# Patient Record
Sex: MALE | Race: WHITE | NOT HISPANIC OR LATINO | URBAN - METROPOLITAN AREA
[De-identification: names, ages, dates, MRNs, and addresses within clinical notes are randomized per-mention and may not be internally consistent; named-entity substitution may affect disease eponyms.]

---

## 2017-01-23 ENCOUNTER — OFFICE VISIT (OUTPATIENT)
Dept: NEUROLOGY | Facility: CLINIC | Age: 32
End: 2017-01-23
Attending: PSYCHIATRY & NEUROLOGY
Payer: COMMERCIAL

## 2017-01-23 VITALS
HEART RATE: 87 BPM | WEIGHT: 200.2 LBS | HEIGHT: 73 IN | BODY MASS INDEX: 26.53 KG/M2 | DIASTOLIC BLOOD PRESSURE: 82 MMHG | SYSTOLIC BLOOD PRESSURE: 145 MMHG

## 2017-01-23 DIAGNOSIS — G35 MULTIPLE SCLEROSIS (H): Primary | ICD-10-CM

## 2017-01-23 DIAGNOSIS — R53.82 CHRONIC FATIGUE: ICD-10-CM

## 2017-01-23 DIAGNOSIS — R52 PAIN: ICD-10-CM

## 2017-01-23 PROCEDURE — 99212 OFFICE O/P EST SF 10 MIN: CPT | Mod: ZF

## 2017-01-23 RX ORDER — DEXTROAMPHETAMINE SACCHARATE, AMPHETAMINE ASPARTATE, DEXTROAMPHETAMINE SULFATE AND AMPHETAMINE SULFATE 2.5; 2.5; 2.5; 2.5 MG/1; MG/1; MG/1; MG/1
TABLET ORAL
Qty: 60 TABLET | Refills: 0 | Status: SHIPPED | OUTPATIENT
Start: 2017-01-23 | End: 2017-02-21

## 2017-01-23 RX ORDER — GABAPENTIN 300 MG/1
CAPSULE ORAL
Qty: 120 CAPSULE | Refills: 6 | Status: SHIPPED | OUTPATIENT
Start: 2017-01-23 | End: 2017-11-12

## 2017-01-23 ASSESSMENT — PAIN SCALES - GENERAL: PAINLEVEL: NO PAIN (0)

## 2017-01-23 NOTE — NURSING NOTE
Chief Complaint   Patient presents with     RECHECK     [facility] RETURN MULTIPLE SCLEROSIS     Marija San MA

## 2017-01-23 NOTE — Clinical Note
2017      RE: Jessy Arreola  [address] [facility] [address]       HISTORY OF PRESENT ILLNESS:  Appointment on Jessy Arreola who I see for relapsing-remitting multiple sclerosis, stable on Rebif.  The patient states overall he feels he is doing well.  When I last saw him, his mood was not good.  He states he is doing much better.  The gabapentin has been helpful for his neuropathic pain and restless legs at night.  He ran out a day or 2 ago and he has not been able to sleep.  He states the Adderall works well for his fatigue.  Generally sleeping well at night.  He is not missing any doses of his Rebif.  He states overall he feels he is doing well.  He has no issues or concerns he wishes to address at this time.  Last MRI was done back in 2015 and was stable.      MEDICATIONS:  Reviewed and up to date in Epic.      REVIEW OF SYSTEMS:  As per History of Present Illness.      PHYSICAL EXAMINATION:   VITAL SIGNS:  Blood pressure 145/82, pulse 87, weight 200 pounds.   GENERAL:  Pleasant, well-developed, well-nourished male in no apparent distress.   CRANIAL NERVES:  Cranial nerves II-XII are intact.   MOTOR:  Strength is 5/5 in all extremities, normal bulk and tone, no pronator drift.   CEREBELLAR:  Intact finger-to-nose.   GAIT:  Unremarkable.      IMPRESSION:     1.  Relapsing-remitting multiple sclerosis, stable.   2.  Bilateral foot pain improved with gabapentin at bedtime.   3.  Restless leg syndrome improved with gabapentin.      PLAN:   1.  Refilled the gabapentin.     2.  Refilled the Adderall.   3.  Get a CBC and hepatic profile today.  The patient is going to get a followup MRI in approximately a year prior to his followup appointment with one of the other MS doctors.         ELLYN SARABIA DO             D: 2017 10:29   T: 2017 08:39   MT: ismael      Name:     JESSY ARREOLA   MRN:      [id number]        Account:      [id number]   :      1985            Service Date: 01/23/2017      Document: [id number]

## 2017-01-23 NOTE — Clinical Note
1/23/2017       RE: Darin Maier  [address] [facility] [address]     Dear Colleague,    Thank you for referring your patient, Darin Maier, to the [facility] MULTIPLE SCLEROSIS at [facility]. Please see a copy of my visit note below.    This office note has been dictated.     Again, thank you for allowing me to participate in the care of your patient.      Sincerely,    Emile Kinney, DO

## 2017-01-24 NOTE — PROGRESS NOTES
HISTORY OF PRESENT ILLNESS:  Appointment on Jessy Arreola who I see for relapsing-remitting multiple sclerosis, stable on Rebif.  The patient states overall he feels he is doing well.  When I last saw him, his mood was not good.  He states he is doing much better.  The gabapentin has been helpful for his neuropathic pain and restless legs at night.  He ran out a day or 2 ago and he has not been able to sleep.  He states the Adderall works well for his fatigue.  Generally sleeping well at night.  He is not missing any doses of his Rebif.  He states overall he feels he is doing well.  He has no issues or concerns he wishes to address at this time.  Last MRI was done back in 2015 and was stable.      MEDICATIONS:  Reviewed and up to date in Epic.      REVIEW OF SYSTEMS:  As per History of Present Illness.      PHYSICAL EXAMINATION:   VITAL SIGNS:  Blood pressure 145/82, pulse 87, weight 200 pounds.   GENERAL:  Pleasant, well-developed, well-nourished male in no apparent distress.   CRANIAL NERVES:  Cranial nerves II-XII are intact.   MOTOR:  Strength is 5/5 in all extremities, normal bulk and tone, no pronator drift.   CEREBELLAR:  Intact finger-to-nose.   GAIT:  Unremarkable.      IMPRESSION:     1.  Relapsing-remitting multiple sclerosis, stable.   2.  Bilateral foot pain improved with gabapentin at bedtime.   3.  Restless leg syndrome improved with gabapentin.      PLAN:   1.  Refilled the gabapentin.     2.  Refilled the Adderall.   3.  Get a CBC and hepatic profile today.  The patient is going to get a followup MRI in approximately a year prior to his followup appointment with one of the other MS doctors.         ELLYN SARABIA DO             D: 2017 10:29   T: 2017 08:39   MT: ismael      Name:     JESSY ARREOLA   MRN:      [id number]        Account:      [id number]   :      1985           Service Date: 2017      Document: [id number]

## 2017-02-09 DIAGNOSIS — G35 MULTIPLE SCLEROSIS (H): Primary | ICD-10-CM

## 2017-02-09 NOTE — TELEPHONE ENCOUNTER
Received refill request for Rebif from [facility]; Patient was last seen in January by Dr Kinney and will follow up appointment in 1 year with another MS provider (not scheduled yet); Refilled for 1 year per MS refill protocol.    Airam Cazares, MS RN Care Coordinator

## 2017-02-21 DIAGNOSIS — R53.82 CHRONIC FATIGUE: ICD-10-CM

## 2017-02-21 RX ORDER — DEXTROAMPHETAMINE SACCHARATE, AMPHETAMINE ASPARTATE, DEXTROAMPHETAMINE SULFATE AND AMPHETAMINE SULFATE 2.5; 2.5; 2.5; 2.5 MG/1; MG/1; MG/1; MG/1
TABLET ORAL
Qty: 60 TABLET | Refills: 0 | Status: SHIPPED | OUTPATIENT
Start: 2017-02-21 | End: 2017-03-24

## 2017-02-21 NOTE — TELEPHONE ENCOUNTER
Patient called requesting refill of their Adderall; Patient was last seen in January by Dr Kinney and will follow up appointment with a different MS provider in January 2018; Pended rx to Dr Phelps for signature, as Dr Kinney is not available to sign prescriptions, and will mail to the home once signed.    Airam Cazares, MS RN Care Coordinator

## 2017-03-24 ENCOUNTER — TELEPHONE (OUTPATIENT)
Dept: NEUROLOGY | Facility: CLINIC | Age: 32
End: 2017-03-24

## 2017-03-24 DIAGNOSIS — R53.82 CHRONIC FATIGUE: ICD-10-CM

## 2017-03-28 RX ORDER — DEXTROAMPHETAMINE SACCHARATE, AMPHETAMINE ASPARTATE, DEXTROAMPHETAMINE SULFATE AND AMPHETAMINE SULFATE 2.5; 2.5; 2.5; 2.5 MG/1; MG/1; MG/1; MG/1
TABLET ORAL
Qty: 60 TABLET | Refills: 0 | Status: SHIPPED | OUTPATIENT
Start: 2017-03-28 | End: 2017-04-27

## 2017-04-27 DIAGNOSIS — R53.82 CHRONIC FATIGUE: ICD-10-CM

## 2017-04-27 RX ORDER — DEXTROAMPHETAMINE SACCHARATE, AMPHETAMINE ASPARTATE, DEXTROAMPHETAMINE SULFATE AND AMPHETAMINE SULFATE 2.5; 2.5; 2.5; 2.5 MG/1; MG/1; MG/1; MG/1
TABLET ORAL
Qty: 60 TABLET | Refills: 0 | Status: SHIPPED | OUTPATIENT
Start: 2017-04-27 | End: 2017-06-02

## 2017-04-27 NOTE — TELEPHONE ENCOUNTER
Patient called requesting refill of their Adderall; Patient was last seen in January by Dr Kinney and will follow up appointment with a different MS provider in January 2018; Pended rx to Dr Lyeva for signature, as Dr Kinney is not available to sign prescriptions, and will mail to the home once signed.      Airam Cazares, MS RN Care Coordinator

## 2017-04-27 NOTE — TELEPHONE ENCOUNTER
Approved refill for Adderall 10 mg, quantity 60 with no refills for this patient of Dr. Kinney as he is away from the clinic and unavailable to sign prescriptions.

## 2017-06-02 DIAGNOSIS — R53.82 CHRONIC FATIGUE: ICD-10-CM

## 2017-06-02 NOTE — TELEPHONE ENCOUNTER
Patient called requesting refill of their Adderall; Patient was last seen in January and has no follow up appointment with any of our MS providers; Pended rx to Dr. Phelps for signature on behalf of Dr. Kinney as he is away from the office and will mail to Darin's home once signed. I will have the clinic coordinators call Darin and have them schedule a follow up with one of our providers.    Aicha Powers, MS RN Care Coordinator

## 2017-06-06 RX ORDER — DEXTROAMPHETAMINE SACCHARATE, AMPHETAMINE ASPARTATE, DEXTROAMPHETAMINE SULFATE AND AMPHETAMINE SULFATE 2.5; 2.5; 2.5; 2.5 MG/1; MG/1; MG/1; MG/1
TABLET ORAL
Qty: 60 TABLET | Refills: 0 | Status: SHIPPED | OUTPATIENT
Start: 2017-06-06 | End: 2017-07-06

## 2017-07-06 DIAGNOSIS — R53.82 CHRONIC FATIGUE: ICD-10-CM

## 2017-07-06 RX ORDER — DEXTROAMPHETAMINE SACCHARATE, AMPHETAMINE ASPARTATE, DEXTROAMPHETAMINE SULFATE AND AMPHETAMINE SULFATE 2.5; 2.5; 2.5; 2.5 MG/1; MG/1; MG/1; MG/1
TABLET ORAL
Qty: 60 TABLET | Refills: 0 | Status: SHIPPED | OUTPATIENT
Start: 2017-07-06 | End: 2017-08-08

## 2017-07-06 NOTE — TELEPHONE ENCOUNTER
Patient called requesting refill of their Adderall; Patient was last seen in January by Dr Kinney and has follow up appointment in January 2018 with Dr Spear; Pended rx to Dr Leyva for signature, on behalf of Dr Kinney; and will mail to the home once signed.    Airam Cazares MS RN Care Coordinator

## 2017-07-06 NOTE — TELEPHONE ENCOUNTER
Approved refill for Adderall 10 mg, quantity 60 with no refills for this patient who was previously followed in this clinic by Dr. Kinney and has follow up scheduled with Dr. Spear.

## 2017-07-25 ENCOUNTER — TELEPHONE (OUTPATIENT)
Dept: NEUROLOGY | Facility: CLINIC | Age: 32
End: 2017-07-25

## 2017-07-25 NOTE — TELEPHONE ENCOUNTER
I called Darin back with Dr Phelps's input; He is fine with this plan and will call me any symptoms worsen or persist; He says that he does not have jaw pain, and that as far as he knows, does not grind his teeth at night; He asked about his MRI, and would like it done at [facility], which I will arrange in December; He did not complete his labs when he was here for his appointment with Dr Kinney back in January; He would like to do these at [facility] (phone [phone] fax [phone]); I will fax these on Friday, and Darin will complete these next week.    Airam Cazares, MS RN Care Coordinator

## 2017-07-25 NOTE — TELEPHONE ENCOUNTER
I received the following message from the triage nurse:    Symptom tight muscles of tongue, and Right cheek (didn't say for how long) which is annoying. Unsure if he should be concerned about it. Please address.    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    Airam Cazaers, MS RN Care Coordinator

## 2017-07-25 NOTE — TELEPHONE ENCOUNTER
Just watch it for now.  Does he grind his teeth at high, or have jaw pain?  It's not a symptom that is typical of MS relapse.

## 2017-08-08 DIAGNOSIS — R53.82 CHRONIC FATIGUE: ICD-10-CM

## 2017-08-08 RX ORDER — DEXTROAMPHETAMINE SACCHARATE, AMPHETAMINE ASPARTATE, DEXTROAMPHETAMINE SULFATE AND AMPHETAMINE SULFATE 2.5; 2.5; 2.5; 2.5 MG/1; MG/1; MG/1; MG/1
TABLET ORAL
Qty: 60 TABLET | Refills: 0 | Status: SHIPPED | OUTPATIENT
Start: 2017-08-08 | End: 2017-09-07

## 2017-08-08 NOTE — TELEPHONE ENCOUNTER
Patient called requesting refill of their Adderall; Patient was last seen in January by Dr Kinney and has follow up appointment in January 2018 with Dr Spear; Pended rx to Dr Phelps for signature, on behalf of Dr Kinney; and will mail to the home once signed.     Airam Cazares, MS RN Care Coordinator

## 2017-09-07 DIAGNOSIS — R53.82 CHRONIC FATIGUE: ICD-10-CM

## 2017-09-11 ENCOUNTER — TELEPHONE (OUTPATIENT)
Dept: NEUROLOGY | Facility: CLINIC | Age: 32
End: 2017-09-11

## 2017-09-11 NOTE — TELEPHONE ENCOUNTER
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 Kinney for input, then call him back later today or tomorrow; Darin asked about his Adderall, and we talked about requesting his refill in sufficient time; He asked if Dr. Kinney would be willing to provide him 2 months supply at a time, given he works 120hrs/week, and doesn't get to the pharmacy in time for his refill; I told him that I would ask, but it is generally a clinic policy, but exceptions can be made; Will route to Dr. Kinney for input.    Airam Cazares, MS RN Care Coordinator

## 2017-09-12 RX ORDER — DEXTROAMPHETAMINE SACCHARATE, AMPHETAMINE ASPARTATE, DEXTROAMPHETAMINE SULFATE AND AMPHETAMINE SULFATE 2.5; 2.5; 2.5; 2.5 MG/1; MG/1; MG/1; MG/1
TABLET ORAL
Qty: 60 TABLET | Refills: 0 | Status: SHIPPED | OUTPATIENT
Start: 2017-09-12 | End: 2017-10-12

## 2017-09-14 NOTE — TELEPHONE ENCOUNTER
I called Darin back with Dr. Kinney's input; He is just worried, as well as his mom and wife, about his symptoms; He is going to follow up with his PCP, and I am going to have Roberta call him on Monday; I did give him the hospital phone number for just in case he worsens over the weekend.    Airam Cazares, MS RN Care Coordinator

## 2017-09-18 NOTE — TELEPHONE ENCOUNTER
"Called and spoke with Darin who states that his symptoms have not improved. He continues to have a sore neck on the right side and numbness in the right shoulder down the arm and into the thumb. He says the numbness is \"bothersome\" but does not associate pain. He did see a primary care doctor on Friday and the doctor said he is very \"tight\" and was possibly thinking a pinched nerve although no imaging was done. The MD did prescribe a round of oral steroids for an insect bite that was causing swelling on the right leg. This helped the swelling but did not help any of the symptoms he has been experiencing for about the last 2 weeks. Pt also said that his foot symptoms are worsening (feet are more achy and it \"feels like I'm walking barefoot all the time\").   Pt would like to see Elizabeth Ramirez NP and possibly have imaging done now vs in December prior to his appointment with Dr. Spear in early January. Will route to clinic coordinators to schedule.     Roberta Simon RN Care Coordinator      "

## 2017-09-21 ENCOUNTER — OFFICE VISIT (OUTPATIENT)
Dept: NEUROLOGY | Facility: CLINIC | Age: 32
End: 2017-09-21
Attending: NURSE PRACTITIONER
Payer: COMMERCIAL

## 2017-09-21 VITALS — DIASTOLIC BLOOD PRESSURE: 81 MMHG | HEART RATE: 88 BPM | HEIGHT: 73 IN | SYSTOLIC BLOOD PRESSURE: 126 MMHG

## 2017-09-21 DIAGNOSIS — G35 MULTIPLE SCLEROSIS (H): ICD-10-CM

## 2017-09-21 DIAGNOSIS — G35 MULTIPLE SCLEROSIS (H): Primary | ICD-10-CM

## 2017-09-21 DIAGNOSIS — M25.511 RIGHT SHOULDER PAIN, UNSPECIFIED CHRONICITY: ICD-10-CM

## 2017-09-21 LAB
ALBUMIN SERPL-MCNC: 3.6 G/DL (ref 3.4–5)
ALP SERPL-CCNC: 87 U/L (ref 40–150)
ALT SERPL W P-5'-P-CCNC: 32 U/L (ref 0–70)
AST SERPL W P-5'-P-CCNC: 13 U/L (ref 0–45)
BASOPHILS # BLD AUTO: 0 10E9/L (ref 0–0.2)
BASOPHILS NFR BLD AUTO: 0.1 %
BILIRUB DIRECT SERPL-MCNC: 0.1 MG/DL (ref 0–0.2)
BILIRUB SERPL-MCNC: 0.3 MG/DL (ref 0.2–1.3)
DIFFERENTIAL METHOD BLD: NORMAL
EOSINOPHIL # BLD AUTO: 0 10E9/L (ref 0–0.7)
EOSINOPHIL NFR BLD AUTO: 0.6 %
ERYTHROCYTE [DISTWIDTH] IN BLOOD BY AUTOMATED COUNT: 12.3 % (ref 10–15)
HCT VFR BLD AUTO: 45.8 % (ref 40–53)
HGB BLD-MCNC: 15.4 G/DL (ref 13.3–17.7)
IMM GRANULOCYTES # BLD: 0 10E9/L (ref 0–0.4)
IMM GRANULOCYTES NFR BLD: 0.6 %
LYMPHOCYTES # BLD AUTO: 1.9 10E9/L (ref 0.8–5.3)
LYMPHOCYTES NFR BLD AUTO: 27.6 %
MCH RBC QN AUTO: 30 PG (ref 26.5–33)
MCHC RBC AUTO-ENTMCNC: 33.6 G/DL (ref 31.5–36.5)
MCV RBC AUTO: 89 FL (ref 78–100)
MONOCYTES # BLD AUTO: 0.7 10E9/L (ref 0–1.3)
MONOCYTES NFR BLD AUTO: 9.8 %
NEUTROPHILS # BLD AUTO: 4.2 10E9/L (ref 1.6–8.3)
NEUTROPHILS NFR BLD AUTO: 61.3 %
NRBC # BLD AUTO: 0 10*3/UL
NRBC BLD AUTO-RTO: 0 /100
PLATELET # BLD AUTO: 215 10E9/L (ref 150–450)
PROT SERPL-MCNC: 7.4 G/DL (ref 6.8–8.8)
RBC # BLD AUTO: 5.14 10E12/L (ref 4.4–5.9)
WBC # BLD AUTO: 6.9 10E9/L (ref 4–11)

## 2017-09-21 PROCEDURE — 80076 HEPATIC FUNCTION PANEL: CPT | Performed by: PSYCHIATRY & NEUROLOGY

## 2017-09-21 PROCEDURE — 85025 COMPLETE CBC W/AUTO DIFF WBC: CPT | Performed by: PSYCHIATRY & NEUROLOGY

## 2017-09-21 PROCEDURE — 36415 COLL VENOUS BLD VENIPUNCTURE: CPT | Performed by: PSYCHIATRY & NEUROLOGY

## 2017-09-21 PROCEDURE — 99211 OFF/OP EST MAY X REQ PHY/QHP: CPT | Mod: ZF

## 2017-09-21 RX ORDER — CYCLOBENZAPRINE HCL 5 MG
5 TABLET ORAL
Qty: 30 TABLET | Refills: 0 | Status: SHIPPED | OUTPATIENT
Start: 2017-09-21 | End: 2017-11-14

## 2017-09-21 NOTE — NURSING NOTE
"Chief Complaint   Patient presents with     RECHECK     Multiple Sclerosis       Initial /81  Pulse 88  Ht 1.854 m (6' 1\") Estimated body mass index is 26.41 kg/(m^2) as calculated from the following:    Height as of 1/23/17: 1.854 m (6' 1\").    Weight as of 1/23/17: 90.8 kg (200 lb 3.2 oz).  Medication Reconciliation: complete   Juan Diego Jewell, JUNIOR        "

## 2017-09-21 NOTE — PATIENT INSTRUCTIONS
1. Flexeril 5 mg at bedtime for right sided shoulder pain. May take it up to twice daily if no side effects.    2. MRI brain and cervical spine within one week.     3. Continue Rebif. We will do labs today for drug monitoring.     4. Follow up with Dr. Spear in January as scheduled.

## 2017-09-21 NOTE — LETTER
9/21/2017       RE: Darin Maier  [address] [facility] [address]     Dear Colleague,    Thank you for referring your patient, Darin Maier, to the [facility] MULTIPLE SCLEROSIS at [facility]. Please see a copy of my visit note below.      [facility] OUTPATIENT MS CLINIC VISIT NOTE    REASON FOR VISIT:  Darin is a 32-year-old male who returns to the clinic today for regularly scheduled followup of his diagnosis of relapsing-remitting multiple sclerosis.  He was most recently seen by Dr. Kinney in 01/2017.      HISTORY OF PRESENT ILLNESS:     DATE OF ONSET:  2004.        INITIAL SYMPTOMS:  Sensory issues and double vision.        INITIAL CLINICAL COURSE:  Relapsing remitting.        CURRENT CLINICAL COURSE:  Relapsing remitting multiple sclerosis.      PAST DISEASE MODIFYING THERAPY:  Copaxone from 2004 through 2005, then started on Rebif in 2005/2006 timeframe.        CURRENT DISEASE MODIFYING THERAPY:  Rebif.        INTERVAL HISTORY: Darin was seen by Dr. Kinney in 01/2017.  At that time he was doing well and stable on Rebif.  Then, patient contacted our clinic on 09/11 with complaints of pain and numbness in his right shoulder all the way down to his right hand.  Today he tells me that the symptoms started all of a sudden rather than progressed throughout the right arm.  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  He thinks that right arm symptoms may have originated around that time. Also feel a tightness in his right shoulder/ upper back/ neck and arm. His right arm feels to be weaker since the new symptoms started.  He is dropping things more frequently.  His legs are working okay.  He continues to have a feeling of walking on gravel on both his feet. He takes  mg at bedtime. He also had a bee sting 2-3 months ago and was treated with  "oral steroids by his primary care physician.      For his MS, he takes Rebif.  No injection site reactions or side effects.  He premedicates with Aleve. Denies any issues with this memory, vision, speech or swallowing.   No issues with balance.  He continues to be able to climb stairs without any issues.  He is not on any medication for depression or anxiety.  He continues to take Adderall for fatigue.  He takes Adderall, sometimes every day throughout the week.  He sleeps about 8 hours at night.  Occasional muscle cramps, especially if he uses alcohol.  Denies any bladder or bowel issues.      PAST MEDICAL/SURGICAL HISTORY:  MS, otherwise unremarkable.      FAMILY HISTORY:  Negative for MS but his mom has lupus.      SOCIAL HISTORY:  He is .  They have 2 children, ages 4 and 2 years old.  He works full-time in lawn care.  Denies smoking.  Occasional alcohol use.  Denies any recreational drug use.      IMAGIN2015 MRI brain with and without contrast.        CONCLUSIONS:     1. Slightly increased conspicuity and number of lesions in the frontal lobe visualized.  Pattern compatible with the multiple sclerosis.  There is no abnormal enhancement, no restricted diffusion or other signs for acute MS plaque.  Increased conspicuity of lesions is likely in part due to a 3 Theresa MR system used on the current exam.     2. Mucous retention cyst in the maxillary sinus stable.      VITAMIN D:  He is currently not taking any vitamin D since it is summertime.  During winter months he takes 5000 international units daily.  I do not have a recent vitamin D level in our system.      PHYSICAL EXAMINATION:   VITAL SIGNS:   /81  Pulse 88  Ht 1.854 m (6' 1\")   MENTAL STATUS: Alert,awake and oriented times four.   CRANIAL NERVES: Visual fields are full to confrontation. The pupils are round and react to light and there is no Blayne Diamond pupil. Funduscopic examination demonstrates no optic pallor, papilledema or retinal " hemorrhage/exudate. Extraocular movements are intact with no internuclear ophthalmoplegia. No nystagmus. Facial strength and sensation are normal. Hearing is  normal. Palate elevation and tongue protrusion are  normal.   POWER: Strength is normal (5/5) in the following muscles/groups: Deltoids, biceps, triceps, wrist extensors, left finger abductors,  hip flexors, knee flexors, foot dorsiflexors. Right finger abductors with mild weakness at 4+/5.   SENSORY: Reports reduced sensation to light touch in right arm. Also had difficulty differentiating sharp/ dull sensation in C5-6 dermatomal pattern.   REFLEXES: Noted brisker reflexes in his right arm. Lower extremities reflexes similar both sides.    MOTOR/CEREBELLAR: There are no tremors, myoclonus or other abnormal movements. Tone is within normal limits in the limbs. There is no appendicular ataxia on finger-to-nose testing and rapid alternating movements are normal in the extremities. There is no pronator drift.   GAIT: Gait is  narrow-based and steady and the patient is able to walk on heels, toes and in tandem without difficulty.     ASSESSMENT AND PLAN:     1. Relapsing remitting multiple sclerosis, on Rebif. Patient presenting with new clinical symptoms for about 4 weeks.  On clinical exam noted hyperactive reflexes in his right arm and also had trouble with differentiating sharp and dull sensation in C5-C6 dermatomal pattern. It is possible that he is having a clinical relapse. We will plan to get a brain and cervical MRI within a week to rule out any new or active lesions. He prefer to get this done at [facility] He will contact our clinic once he completes the MRI so that we can call the clinic and get the images pushed to our system. I gave him a printed order sheet for MRI brain and cervical spine.  We will plan to get a CBC with a differential and hepatic panel for Rebif  Monitoring. I will contact the patient once we have the images are  reviewed.      2. Right-sided neck and shoulder pain.  I gave him a prescription for Flexeril 5 mg to be taken at bedtime and if no side effects, he can increase it to twice daily for possible muscular pain.  I also recommended warm packs and/or ice packs.  He asked about massage therapy and I also placed an order for massage therapy.     3. Follow up with Dr. Spear in January as scheduled.         IKER BEGUM, CNP             D: 2017 12:08   T: 2017 13:16   MT: [id number]      Name:     JESSY ARREOLA   MRN:      [id number]        Account:      [id number]   :      1985           Service Date: 2017      Document: [id number]      Again, thank you for allowing me to participate in the care of your patient.      Sincerely,    IKER Saunders CNP

## 2017-09-21 NOTE — MR AVS SNAPSHOT
After Visit Summary   9/21/2017    Darin Maier    MRN: [id number]           Patient Information     Date Of Birth          1985        Visit Information        Provider Department      9/21/2017 10:30 AM Elizabeth Ramirez APRN CNP [facility] Multiple Sclerosis        Today's Diagnoses     Multiple sclerosis (H)    -  1    Right shoulder pain, unspecified chronicity          Care Instructions    1. Flexeril 5 mg at bedtime for right sided shoulder pain. May take it up to twice daily if no side effects.    2. MRI brain and cervical spine within one week.     3. Continue Rebif. We will do labs today for drug monitoring.     4. Follow up with Dr. Spear in January as scheduled.           Follow-ups after your visit        Your next 10 appointments already scheduled     Papi 10, 2018 12:00 PM CST   (Arrive by 11:45 AM)   Return Multiple Sclerosis with Michael Spear MD   [facility] Multiple Sclerosis ([facility])    [address] [place] [id number] [phone]              Future tests that were ordered for you today     Open Future Orders        Priority Expected Expires Ordered    MRI Brain w & w/o contrast Routine  9/21/2018 9/21/2017    MRI Cervical spine w & w/o contrast Timed  9/21/2018 9/21/2017            Who to contact     If you have questions or need follow up information about today's clinic visit or your schedule please contact [facility] MULTIPLE SCLEROSIS directly at [phone].  Normal or non-critical lab and imaging results will be communicated to you by MyChart, letter or phone within 4 business days after the clinic has received the results. If you do not hear from us within 7 days, please contact the clinic through MyChart or phone. If you have a critical or abnormal lab result, we will notify you by phone as soon as possible.  Submit refill requests through [employer] or call your pharmacy and they will forward the refill  "request to us. Please allow 3 business days for your refill to be completed.          Additional Information About Your Visit        [employer] Information     [employer] lets you send messages to your doctor, view your test results, renew your prescriptions, schedule appointments and more. To sign up, go to [email or web address] . Click on \"Log in\" on the left side of the screen, which will take you to the Welcome page. Then click on \"Sign up Now\" on the right side of the page.     You will be asked to enter the access code listed below, as well as some personal information. Please follow the directions to create your username and password.     Your access code is: [email or web address]  Expires: 2017  6:31 AM     Your access code will  in 90 days. If you need help or a new code, please call your [facility] clinic or [phone].        Care EveryWhere ID     This is your [facility] EveryWhere ID. This could be used by other organizations to access your [facility] medical records  [id number]        Your Vitals Were     Pulse Height                88 1.854 m (6' 1\")           Blood Pressure from Last 3 Encounters:   17 126/81   17 145/82   16 131/75    Weight from Last 3 Encounters:   17 90.8 kg (200 lb 3.2 oz)   13 94.3 kg (208 lb)   13 94.3 kg (208 lb)              We Performed the Following     MASSAGE THERAPY          Today's Medication Changes          These changes are accurate as of: 17  3:04 PM.  If you have any questions, ask your nurse or doctor.               Start taking these medicines.        Dose/Directions    cyclobenzaprine 5 MG tablet   Commonly known as:  FLEXERIL   Used for:  Right shoulder pain, unspecified chronicity   Started by:  Elizabeth Ramirez APRN CNP        Dose:  5 mg   Take 1 tablet (5 mg) by mouth nightly as needed for muscle spasms   Quantity:  30 tablet   Refills:  0            Where to get your medicines      These medications were sent to [id number] - [place], [place] - [address]  [address] [facility] [address]    Hours:  Ok's by albin HUDSON 9/20/06 Phone:  [phone]     cyclobenzaprine 5 MG tablet                Primary Care Provider    None Specified       No primary provider on file.        Equal Access to Services     [facility]: Hadii aad ku hadasho Soomaali, waaxda luqadaha, qaybta kaalmada adeegyada, waxay panfiloin hayaan adesteven khrosezach horvath . So [facility] [phone].    ATENCIÓN: Si habla español, tiene a orta disposición servicios gratuitos de asistencia lingüística. [facility] [phone].    We comply with applicable federal civil rights laws and [place] laws. We do not discriminate on the basis of race, color, national origin, age, disability sex, sexual orientation or gender identity.            Thank you!     Thank you for choosing [facility] MULTIPLE SCLEROSIS  for your care. Our goal is always to provide you with excellent care. Hearing back from our patients is one way we can continue to improve our services. Please take a few minutes to complete the written survey that you may receive in the mail after your visit with us. Thank you!             Your Updated Medication List - Protect others around you: Learn how to safely use, store and throw away your medicines at www.disposemymeds.org.          This list is accurate as of: 9/21/17  3:04 PM.  Always use your most recent med list.                   Brand Name Dispense Instructions for use Diagnosis    ALEVE 220 MG tablet   Generic drug:  naproxen sodium      Take 2 tablets by mouth daily.        amphetamine-dextroamphetamine 10 MG per tablet    ADDERALL    60 tablet    Take 1 in am and 1 prior to 2 pm    Chronic fatigue       cyclobenzaprine 5 MG tablet    FLEXERIL    30 tablet    Take 1 tablet (5 mg) by mouth nightly as needed for muscle spasms    Right shoulder pain, unspecified chronicity       FISH OIL PO      Take 1 capsule by mouth daily.         FLUoxetine 20 MG capsule    PROzac    60 capsule    Take 1 daily for 4 weeks then 2 daily    Multiple sclerosis (H), Depression       gabapentin 300 MG capsule    NEURONTIN    120 capsule    Take 1-4 at bedtime    Multiple sclerosis (H), Pain       interferon beta-1a 44 MCG/0.5ML injection    REBIF    36 Syringe    Inject 0.5 mLs (44 mcg) Subcutaneous three times a week    Multiple sclerosis (H)       VITAMIN D PO      Take 1 capsule by mouth daily. 5000 IU

## 2017-09-21 NOTE — PROGRESS NOTES
[facility] OUTPATIENT MS CLINIC VISIT NOTE    REASON FOR VISIT:  Darin is a 32-year-old male who returns to the clinic today for regularly scheduled followup of his diagnosis of relapsing-remitting multiple sclerosis.  He was most recently seen by Dr. Kinney in 01/2017.      HISTORY OF PRESENT ILLNESS:     DATE OF ONSET:  2004.        INITIAL SYMPTOMS:  Sensory issues and double vision.        INITIAL CLINICAL COURSE:  Relapsing remitting.        CURRENT CLINICAL COURSE:  Relapsing remitting multiple sclerosis.      PAST DISEASE MODIFYING THERAPY:  Copaxone from 2004 through 2005, then started on Rebif in 2005/2006 timeframe.        CURRENT DISEASE MODIFYING THERAPY:  Rebif.        INTERVAL HISTORY: Darin was seen by Dr. Kinney in 01/2017.  At that time he was doing well and stable on Rebif.  Then, patient contacted our clinic on 09/11 with complaints of pain and numbness in his right shoulder all the way down to his right hand.  Today he tells me that the symptoms started all of a sudden rather than progressed throughout the right arm.  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  He thinks that right arm symptoms may have originated around that time. Also feel a tightness in his right shoulder/ upper back/ neck and arm. His right arm feels to be weaker since the new symptoms started.  He is dropping things more frequently.  His legs are working okay.  He continues to have a feeling of walking on gravel on both his feet. He takes  mg at bedtime. He also had a bee sting 2-3 months ago and was treated with oral steroids by his primary care physician.      For his MS, he takes Rebif.  No injection site reactions or side effects.  He premedicates with Aleve. Denies any issues with this memory, vision, speech or swallowing.   No issues with balance.  He continues to be able to climb stairs without  "any issues.  He is not on any medication for depression or anxiety.  He continues to take Adderall for fatigue.  He takes Adderall, sometimes every day throughout the week.  He sleeps about 8 hours at night.  Occasional muscle cramps, especially if he uses alcohol.  Denies any bladder or bowel issues.      PAST MEDICAL/SURGICAL HISTORY:  MS, otherwise unremarkable.      FAMILY HISTORY:  Negative for MS but his mom has lupus.      SOCIAL HISTORY:  He is .  They have 2 children, ages 4 and 2 years old.  He works full-time in lawn care.  Denies smoking.  Occasional alcohol use.  Denies any recreational drug use.      IMAGIN2015 MRI brain with and without contrast.        CONCLUSIONS:     1. Slightly increased conspicuity and number of lesions in the frontal lobe visualized.  Pattern compatible with the multiple sclerosis.  There is no abnormal enhancement, no restricted diffusion or other signs for acute MS plaque.  Increased conspicuity of lesions is likely in part due to a 3 Theresa MR system used on the current exam.     2. Mucous retention cyst in the maxillary sinus stable.      VITAMIN D:  He is currently not taking any vitamin D since it is summertime.  During winter months he takes 5000 international units daily.  I do not have a recent vitamin D level in our system.      PHYSICAL EXAMINATION:   VITAL SIGNS:  /81  Pulse 88  Ht 1.854 m (6' 1\")   MENTAL STATUS: Alert,awake and oriented times four.   CRANIAL NERVES: Visual fields are full to confrontation. The pupils are round and react to light and there is no Blayne Diamond pupil. Funduscopic examination demonstrates no optic pallor, papilledema or retinal hemorrhage/exudate. Extraocular movements are intact with no internuclear ophthalmoplegia. No nystagmus. Facial strength and sensation are normal. Hearing is  normal. Palate elevation and tongue protrusion are  normal.   POWER: Strength is normal (5/5) in the following muscles/groups: " Deltoids, biceps, triceps, wrist extensors, left finger abductors,  hip flexors, knee flexors, foot dorsiflexors. Right finger abductors with mild weakness at 4+/5.   SENSORY: Reports reduced sensation to light touch in right arm. Also had difficulty differentiating sharp/ dull sensation in C5-6 dermatomal pattern.   REFLEXES: Noted brisker reflexes in his right arm. Lower extremities reflexes similar both sides.    MOTOR/CEREBELLAR: There are no tremors, myoclonus or other abnormal movements. Tone is within normal limits in the limbs. There is no appendicular ataxia on finger-to-nose testing and rapid alternating movements are normal in the extremities. There is no pronator drift.   GAIT: Gait is  narrow-based and steady and the patient is able to walk on heels, toes and in tandem without difficulty.     ASSESSMENT AND PLAN:     1. Relapsing remitting multiple sclerosis, on Rebif. Patient presenting with new clinical symptoms for about 4 weeks.  On clinical exam noted hyperactive reflexes in his right arm and also had trouble with differentiating sharp and dull sensation in C5-C6 dermatomal pattern. It is possible that he is having a clinical relapse. We will plan to get a brain and cervical MRI within a week to rule out any new or active lesions. He prefer to get this done at [facility]. He will contact our clinic once he completes the MRI so that we can call the clinic and get the images pushed to our system. I gave him a printed order sheet for MRI brain and cervical spine.  We will plan to get a CBC with a differential and hepatic panel for Rebif  Monitoring. I will contact the patient once we have the images are reviewed.      2. Right-sided neck and shoulder pain.  I gave him a prescription for Flexeril 5 mg to be taken at bedtime and if no side effects, he can increase it to twice daily for possible muscular pain.  I also recommended warm packs and/or ice packs.  He asked about massage therapy and I also  placed an order for massage therapy.     3. Follow up with Dr. Spear in January as scheduled.         IKER BEGUM, CNP             D: 2017 12:08   T: 2017 13:16   MT: bolivar      Name:     JESSY ARREOLA   MRN:      [id number]        Account:      [id number]   :      1985           Service Date: 2017      Document: [id number]

## 2017-10-04 ENCOUNTER — TRANSFERRED RECORDS (OUTPATIENT)
Dept: HEALTH INFORMATION MANAGEMENT | Facility: CLINIC | Age: 32
End: 2017-10-04

## 2017-10-12 DIAGNOSIS — R53.82 CHRONIC FATIGUE: ICD-10-CM

## 2017-10-12 RX ORDER — DEXTROAMPHETAMINE SACCHARATE, AMPHETAMINE ASPARTATE, DEXTROAMPHETAMINE SULFATE AND AMPHETAMINE SULFATE 2.5; 2.5; 2.5; 2.5 MG/1; MG/1; MG/1; MG/1
TABLET ORAL
Qty: 60 TABLET | Refills: 0 | Status: SHIPPED | OUTPATIENT
Start: 2017-10-12 | End: 2017-11-08

## 2017-10-12 NOTE — TELEPHONE ENCOUNTER
His MRI Brain is stable, meaning no new or active lesions compared to his prior MRI in 12/2015. Cervical MRI did not show any active lesions which is reassuring. There was no comparison to his prior cervical MRI. But when I reviewed the cervical MRI and compared to his prior

## 2017-10-12 NOTE — TELEPHONE ENCOUNTER
I called the patient and talked to the patient regarding his recent brain and cervical MRI results. His brain MRI is stable without any new or enhancing lesions. His cervical MRI did not show any enhancing lesions. But there was no comparison made to his prior cervical MRI.  But when me and Dr. Spear compared his recent cervical MRI to his prior cervical MRI in 2010, there is one new lesion at C1-C2. It is impossible to know whether the new lesion developed prior to starting his current med or not. He reported that his sensory symptoms in his hands are somewhat improved except mild numbness in his right thumb and index finger. He will continue to take Rebif and I told him to let us know if he experience any new symptoms. I signed the prescription for Adderall refill and we will send it to his home address.

## 2017-10-12 NOTE — TELEPHONE ENCOUNTER
Patient called asking for his MRI results, as well as a refill of his Adderall; Elizabeth, patient's MRI report is in the media tab and the images are in PACS; Adderall prescription pended to Elizabeth for signature and will mail to his home once signed.    Airam Cazares, MS RN Care Coordinator

## 2017-11-08 DIAGNOSIS — R53.82 CHRONIC FATIGUE: ICD-10-CM

## 2017-11-08 NOTE — TELEPHONE ENCOUNTER
Patient called requesting refill of their Adderall; Patient was last seen in September with Elizabeth Ramirez NP and has follow up appointment in January with Dr. Spear. Pended rx to Elizabeth Ramirez NP for signature and will mail to the patient's home once signed.  Please note that patient is requesting 120 tablets (2 month supply).     Roberta Simon RN Care Coordinator

## 2017-11-09 RX ORDER — DEXTROAMPHETAMINE SACCHARATE, AMPHETAMINE ASPARTATE, DEXTROAMPHETAMINE SULFATE AND AMPHETAMINE SULFATE 2.5; 2.5; 2.5; 2.5 MG/1; MG/1; MG/1; MG/1
TABLET ORAL
Qty: 60 TABLET | Refills: 0 | Status: SHIPPED | OUTPATIENT
Start: 2017-11-09 | End: 2017-12-05

## 2017-11-12 DIAGNOSIS — G35 MULTIPLE SCLEROSIS (H): ICD-10-CM

## 2017-11-12 DIAGNOSIS — R52 PAIN: ICD-10-CM

## 2017-11-13 RX ORDER — GABAPENTIN 300 MG/1
CAPSULE ORAL
Qty: 120 CAPSULE | Refills: 5 | Status: SHIPPED | OUTPATIENT
Start: 2017-11-13 | End: 2018-06-06

## 2017-11-13 NOTE — TELEPHONE ENCOUNTER
Received refill request for gabapentin from [facility]; Patient was last seen in September by Elizabeth Ramirez NP and has follow up appointment in January with Dr. Spear. Refilled for 6 months per MS refill protocol.    Roberta Simon, EVA Care Coordinator

## 2017-11-14 DIAGNOSIS — M25.511 RIGHT SHOULDER PAIN, UNSPECIFIED CHRONICITY: ICD-10-CM

## 2017-11-14 RX ORDER — CYCLOBENZAPRINE HCL 5 MG
5 TABLET ORAL
Qty: 30 TABLET | Refills: 0 | Status: SHIPPED | OUTPATIENT
Start: 2017-11-14 | End: 2019-02-05

## 2017-11-14 NOTE — TELEPHONE ENCOUNTER
Received refill request for Flexeril from patient; Patient was last seen in September by Elizabeth Ramirez NP and has follow up appointment in January with Dr. Spear. Refilled for 1 month.    Roberta Simon RN Care Coordinator

## 2017-11-15 NOTE — TELEPHONE ENCOUNTER
Flexeril was prescribed for a short duration for his shoulder pain. I do not want him to continue that for a longer period. If he continues to have shoulder and neck pain, he should be seen by his PCP.

## 2017-11-30 NOTE — PROGRESS NOTES
The patient first developed symptoms of right leg numbness in 2004. This leg numbness lasted for one month and resolved without any treatment. He was evaluated and was told that this was related to stress. He did well until 2006, when he developed numbness of his right hand. This once again lasted for about a month and resolved without treatment. The patient did well until June of 2008, when he developed numbness in his right hand and face. His symptoms once again resolved without treatment. After developing the third episode of numbness, he was referred to a neurologist. He had a MRI that demonstrated multiple T2 lesions with one enhancing lesions. He underwent a spinal tap, which was positive for OCB and had an IgG index. He was therefore, diagnosed with multiple sclerosis and started on Copaxone. He had a relapse of double vision shortly after starting copaxone. HE was on copaxone from 2009 til 2010 While on Copaxone, he continued to have relapses, Consequently, he was transitioned to Rebif in 2010 and has done well. Since starting the injection, he has tolerated the injections well.  He has had no relapses or progression of his disease.

## 2017-12-05 ENCOUNTER — TELEPHONE (OUTPATIENT)
Dept: NEUROLOGY | Facility: CLINIC | Age: 32
End: 2017-12-05

## 2017-12-05 DIAGNOSIS — R20.9 DISTURBANCE OF SKIN SENSATION: Primary | ICD-10-CM

## 2017-12-05 DIAGNOSIS — R53.82 CHRONIC FATIGUE: ICD-10-CM

## 2017-12-05 RX ORDER — DEXTROAMPHETAMINE SACCHARATE, AMPHETAMINE ASPARTATE, DEXTROAMPHETAMINE SULFATE AND AMPHETAMINE SULFATE 2.5; 2.5; 2.5; 2.5 MG/1; MG/1; MG/1; MG/1
TABLET ORAL
Qty: 60 TABLET | Refills: 0 | Status: SHIPPED | OUTPATIENT
Start: 2017-12-05 | End: 2018-01-11

## 2017-12-05 NOTE — TELEPHONE ENCOUNTER
EMG order placed per Elizabeth; I left M for Darin advising him of this and will have a clinic coordinator call him to arrange that appointment.    Airam Cazares, MS RN Care Coordinator

## 2017-12-05 NOTE — TELEPHONE ENCOUNTER
Printed and Singed Adderall.    Michael Spear MD [facility]  Staff Neurologist   12/05/17

## 2017-12-05 NOTE — TELEPHONE ENCOUNTER
Adderall prescription placed in the mail to his home; Will wait to hear back from Elizabeth regarding the EMG.    Airam Cazares, MS RN Care Coordinator

## 2017-12-05 NOTE — TELEPHONE ENCOUNTER
Of both arms? Just the affected arm (which I believe is the right side)? Thank you.    Airam Cazares MS RN Care Coordinator

## 2017-12-05 NOTE — TELEPHONE ENCOUNTER
"Darin left [facility] asking for me to call him regarding his appointment today; Due to weather, he doesn't feel he can make it, which furthermore, he had an appointment with Elizabeth back in September, at which time they discussed his MRI results; Darin continues to have numbness in his thumb, therefore, he is asking for the \"nerve test\" (EMG) prior to the end of the year, since he has met his deductible, then follow up with Dr. Spear; I think this is a reasonable request, and I told Darin that I would check with Elizabeth to see that she would be okay; He is also asking for a refill of his Adderall; Dr. Spear is okay with signing this; Will route to Elizabeth for input on the EMG.    Airam Cazares, MS RN Care Coordinator  "

## 2017-12-18 NOTE — TELEPHONE ENCOUNTER
Per patient request, EMG orders faxed to [facility] (fax: [phone]) as this would be more convenient for him. He will cancel his appointment tomorrow only when he finds out that [facility] will accept his insurance etc.     Roberta Simon, RN Care Coordinator

## 2017-12-21 ENCOUNTER — TRANSFERRED RECORDS (OUTPATIENT)
Dept: HEALTH INFORMATION MANAGEMENT | Facility: CLINIC | Age: 32
End: 2017-12-21

## 2018-01-11 DIAGNOSIS — R53.82 CHRONIC FATIGUE: ICD-10-CM

## 2018-01-11 RX ORDER — DEXTROAMPHETAMINE SACCHARATE, AMPHETAMINE ASPARTATE, DEXTROAMPHETAMINE SULFATE AND AMPHETAMINE SULFATE 2.5; 2.5; 2.5; 2.5 MG/1; MG/1; MG/1; MG/1
TABLET ORAL
Qty: 60 TABLET | Refills: 0 | Status: SHIPPED | OUTPATIENT
Start: 2018-01-11 | End: 2018-02-06

## 2018-01-11 NOTE — TELEPHONE ENCOUNTER
Patient called requesting refill of their Adderall; Patient was last seen in September by Elizabeth Ramirez NP and does not have a scheduled follow up appointment at this time but patient is to establish care with Dr. Spear. Pended rx to Dr. Leyva for signature and will mail to the patient's home once signed. Will have Clinic Coordinator contact patient to schedule with Dr. Spear.     Roberta Simon RN Care Coordinator

## 2018-01-11 NOTE — TELEPHONE ENCOUNTER
Called patient and he spoke to [facility] today and requested that they send his EMG records to us.   He is still agreeable to see Dr. Spear. Again, will have Clinic Coordinator contact him.

## 2018-01-11 NOTE — TELEPHONE ENCOUNTER
Approved refill of Adderall 10 mg, quantity 60 with no refills for this former patient of Dr. Kinney who is scheduled to follow up with Dr. Spear.

## 2018-02-06 ENCOUNTER — OFFICE VISIT (OUTPATIENT)
Dept: NEUROLOGY | Facility: CLINIC | Age: 33
End: 2018-02-06
Attending: PSYCHIATRY & NEUROLOGY
Payer: COMMERCIAL

## 2018-02-06 VITALS
BODY MASS INDEX: 28.08 KG/M2 | WEIGHT: 211.9 LBS | HEIGHT: 73 IN | HEART RATE: 88 BPM | DIASTOLIC BLOOD PRESSURE: 79 MMHG | SYSTOLIC BLOOD PRESSURE: 127 MMHG

## 2018-02-06 DIAGNOSIS — R53.82 CHRONIC FATIGUE: ICD-10-CM

## 2018-02-06 DIAGNOSIS — G35 MS (MULTIPLE SCLEROSIS) (H): Primary | ICD-10-CM

## 2018-02-06 LAB
ALBUMIN SERPL-MCNC: 4.2 G/DL (ref 3.4–5)
ALP SERPL-CCNC: 92 U/L (ref 40–150)
ALT SERPL W P-5'-P-CCNC: 42 U/L (ref 0–70)
ANION GAP SERPL CALCULATED.3IONS-SCNC: 6 MMOL/L (ref 3–14)
AST SERPL W P-5'-P-CCNC: 21 U/L (ref 0–45)
BASOPHILS # BLD AUTO: 0 10E9/L (ref 0–0.2)
BASOPHILS NFR BLD AUTO: 0.5 %
BILIRUB SERPL-MCNC: 0.2 MG/DL (ref 0.2–1.3)
BUN SERPL-MCNC: 15 MG/DL (ref 7–30)
CALCIUM SERPL-MCNC: 9 MG/DL (ref 8.5–10.1)
CHLORIDE SERPL-SCNC: 105 MMOL/L (ref 94–109)
CO2 SERPL-SCNC: 28 MMOL/L (ref 20–32)
CREAT SERPL-MCNC: 0.79 MG/DL (ref 0.66–1.25)
DIFFERENTIAL METHOD BLD: ABNORMAL
EOSINOPHIL # BLD AUTO: 0 10E9/L (ref 0–0.7)
EOSINOPHIL NFR BLD AUTO: 0.5 %
ERYTHROCYTE [DISTWIDTH] IN BLOOD BY AUTOMATED COUNT: 12 % (ref 10–15)
GFR SERPL CREATININE-BSD FRML MDRD: >90 ML/MIN/1.7M2
GLUCOSE SERPL-MCNC: 94 MG/DL (ref 70–99)
HCT VFR BLD AUTO: 45.8 % (ref 40–53)
HGB BLD-MCNC: 15.7 G/DL (ref 13.3–17.7)
IMM GRANULOCYTES # BLD: 0 10E9/L (ref 0–0.4)
IMM GRANULOCYTES NFR BLD: 0.3 %
LYMPHOCYTES # BLD AUTO: 1 10E9/L (ref 0.8–5.3)
LYMPHOCYTES NFR BLD AUTO: 24.6 %
MCH RBC QN AUTO: 30.5 PG (ref 26.5–33)
MCHC RBC AUTO-ENTMCNC: 34.3 G/DL (ref 31.5–36.5)
MCV RBC AUTO: 89 FL (ref 78–100)
MONOCYTES # BLD AUTO: 0.4 10E9/L (ref 0–1.3)
MONOCYTES NFR BLD AUTO: 9.8 %
NEUTROPHILS # BLD AUTO: 2.5 10E9/L (ref 1.6–8.3)
NEUTROPHILS NFR BLD AUTO: 64.3 %
NRBC # BLD AUTO: 0 10*3/UL
NRBC BLD AUTO-RTO: 0 /100
PLATELET # BLD AUTO: 199 10E9/L (ref 150–450)
POTASSIUM SERPL-SCNC: 3.9 MMOL/L (ref 3.4–5.3)
PROT SERPL-MCNC: 8 G/DL (ref 6.8–8.8)
RBC # BLD AUTO: 5.15 10E12/L (ref 4.4–5.9)
SODIUM SERPL-SCNC: 140 MMOL/L (ref 133–144)
TSH SERPL DL<=0.005 MIU/L-ACNC: 1.09 MU/L (ref 0.4–4)
WBC # BLD AUTO: 3.9 10E9/L (ref 4–11)

## 2018-02-06 PROCEDURE — 84443 ASSAY THYROID STIM HORMONE: CPT | Performed by: PSYCHIATRY & NEUROLOGY

## 2018-02-06 PROCEDURE — G0463 HOSPITAL OUTPT CLINIC VISIT: HCPCS | Mod: ZF

## 2018-02-06 PROCEDURE — 80053 COMPREHEN METABOLIC PANEL: CPT | Performed by: PSYCHIATRY & NEUROLOGY

## 2018-02-06 PROCEDURE — 36415 COLL VENOUS BLD VENIPUNCTURE: CPT | Performed by: PSYCHIATRY & NEUROLOGY

## 2018-02-06 PROCEDURE — 85025 COMPLETE CBC W/AUTO DIFF WBC: CPT | Performed by: PSYCHIATRY & NEUROLOGY

## 2018-02-06 RX ORDER — DEXTROAMPHETAMINE SACCHARATE, AMPHETAMINE ASPARTATE, DEXTROAMPHETAMINE SULFATE AND AMPHETAMINE SULFATE 2.5; 2.5; 2.5; 2.5 MG/1; MG/1; MG/1; MG/1
TABLET ORAL
Qty: 60 TABLET | Refills: 0 | Status: SHIPPED | OUTPATIENT
Start: 2018-02-06 | End: 2018-05-22

## 2018-02-06 RX ORDER — DEXTROAMPHETAMINE SACCHARATE, AMPHETAMINE ASPARTATE, DEXTROAMPHETAMINE SULFATE AND AMPHETAMINE SULFATE 2.5; 2.5; 2.5; 2.5 MG/1; MG/1; MG/1; MG/1
TABLET ORAL
Qty: 60 TABLET | Refills: 0 | Status: SHIPPED | OUTPATIENT
Start: 2018-03-06 | End: 2018-05-22

## 2018-02-06 RX ORDER — DEXTROAMPHETAMINE SACCHARATE, AMPHETAMINE ASPARTATE, DEXTROAMPHETAMINE SULFATE AND AMPHETAMINE SULFATE 2.5; 2.5; 2.5; 2.5 MG/1; MG/1; MG/1; MG/1
TABLET ORAL
Qty: 60 TABLET | Refills: 0 | Status: SHIPPED | OUTPATIENT
Start: 2018-02-06 | End: 2018-04-06

## 2018-02-06 ASSESSMENT — PAIN SCALES - GENERAL: PAINLEVEL: NO PAIN (0)

## 2018-02-06 NOTE — PATIENT INSTRUCTIONS
Will get a blood test    Will have you increase your gabapentin to 4 pills at night    Will have you follow up in 6 months.    Please call my office with any questions or concerns.     You saw a neurology provider today at the [facility].  You may have also met with the MS RN Care Coordinator.  In order to get a message to your MS Center provider, you should contact [phone] option 3 for the triage nurse line.    You should contact us via this protocol if you have any of the following symptoms:    New or worsening neurologic symptoms that persist for 24-48 hours, such as:  o New onset of pain or marked worsening of pain  o Difficulty with speech, swallowing, or breathing  o New onset of vertigo or dizziness  o Change in bowel or bladder function (incontinence, difficulty urinating)    Increasing difficulty in self care    Marked changes in vision (double vision, blurred vision, graying of vision)    Change in mobility    Change in cognitive function    Falling    Worsening numbness, tingling or pain with a change in function    Worsening fatigue lasting more than 2 weeks  If you had labs completed today, we will contact you with the results.  If you are active in [employer], they will be released to you there.  Otherwise, your results will be provided to you via mail or telephone call.  Some results take up to 2 weeks for completion.  If you haven t heard anything about your lab results within 2 weeks, you can call or send a [employer] message to obtain your results.  If you have an MRI scheduled in the week or two prior to your next appointment, we will go over the results at your scheduled follow up appointment.  If you are not scheduled to see your MS Center provider within about 2 weeks after your MRI, please call or send a [employer] message to obtain your results if you haven t heard anything within 2 weeks.  Please be aware that it takes at least 5 business days after routine  MRIs for your results to be reviewed by both the radiologist and your doctor.  MRIs completed at facilities outside of the [facility] system take about 2 weeks in order for the MRI disc to be mailed to our clinic and uploaded into your medical record.    Prescription refills should be faxed to us by your pharmacy.  Our fax number for prescription refills is [phone].  Please do your best to come to your appointments, and to arrive 15 minutes early to allow time for checking in.  [facility] reserves the right to terminate care of established patients if a patient misses three or more appointments in a clinic without providing notification within a 12-month period.    Developing Your Care Team  Individuals living with chronic illnesses like MS may be unaware that they are at risk for the same range of medical problems as everyone else.  This is why you must establish a relationship with other health care providers in addition to your Multiple Sclerosis doctor.  It can be difficult at times to figure out whether a health concern is related to your MS, or whether it is related to something else, such as hormonal changes, pseduoexacerbations, changes in your core body temperature, flu-like reactions to interferons, exercise, or infections.  Urinary tract infections (UTIs) are common culprits that can cause fatigue, weakness, or other  MS attack -like symptoms without classic symptoms of a UTI.  For this reason, if you call or come in to discuss symptoms, you may be asked to get in touch with your primary provider or another specialist, so that you receive the comprehensive care you need.  What is Multiple Sclerosis (MS)?  MS is a disease in which the nerve tissues in the brain and/or spinal cord are attacked by immune cells in the body.  These immune cells are present in everyone, and their normal role is to fight off infections.  In people with MS, these cells change the way they function and  cross into the nervous system.  Once there, they cause inflammation that damages the myelin (or the protective coating of a nerve cell, much like the plastic covering on an electrical cord) and parts of the nerve cell itself.  So far, a clear cause for this immune system dysfunction has not been found.  MS often starts out as the  relapsing-remitting  form.  This means there are episodes when you have symptoms, and other times when you recover to normal or near-normal.  Over time, if the damage to the nervous system continues, the disease can cause additional disability, such as difficulty walking.  If the relapses and nerve damage can be prevented with available medications, many patients with MS can go many years between relapses and have relatively little disability.  Remember: MS is a condition that changes and must be evaluated on an ongoing basis!  What is a Relapse? (Also called flare-ups, attacks, or exacerbations)  Relapses are due to the occurrence of inflammation in some part of the brain and/or spinal cord.  A relapse is new or recurrent symptoms which persist for at least 24 hours and sometimes worsen over 48 hours.  New symptoms need to be  by at least 1 month in order to be considered separate relapses. Most of the time, symptoms reach their maximal intensity within 2 weeks and then begin to slowly resolve.  At times, your symptoms may not recover fully for up to 6 months, depending on the severity of the episode.  The frequency of relapses is generally higher early in the disease, but can vary greatly among individuals with MS.  Improvement of symptoms for an individual is unpredictable with each relapse.    It is important to remember that an increase in symptoms and changes in function may not necessarily be a relapse.  There are other factors that contribute to such changes, such as hot weather, increased body temperature, infection/illness, stress and sleep deprivation.  The worsening of  symptoms may feel like a relapse when in reality it is not.  These episodes are referred to as pseudorelapses.  Once the underlying cause is addressed, symptoms usually fade away and you feel better.  If you experience a worsening of symptoms that lasts more than 48 hours and does not improve with cooling down, decreasing stress, or treatment of an infection, please call us and we can help to better determine whether you are having a pseudorelapse versus a relapse.

## 2018-02-06 NOTE — LETTER
2/6/2018       RE: Darni Maier  [address] [facility] [id number]     Dear Colleague,    Thank you for referring your patient, Darin Maier, to the [facility] MULTIPLE SCLEROSIS at [facility]. Please see a copy of my visit note below.    MULTIPLE SCLEROSIS CLINIC AT THE [facility]  FOLLOWUP/ESTABLISHED PATIENT VISIT      PRINCIPAL NEUROLOGIC DIAGNOSIS: Multiple Sclerosis    Date of Onset: 2004  Date of Diagnosis: 2006  Initial Clinical Course: Relapsing Remitting  Current Clinical Course: Relapsing Remitting  Past Disease Modifying Therapy(ies): Copaxone  Current Disease Modifying Therapy(ies):Rebif  Most Recent MRI of the Brain: 10/2017  Most Recent MRI of the Cervical Cord 10/2017  Most Recent MRI of the Thoracic Cord: NA  Most Recent Lumbar Puncture: 2008  Most Recent OCT: NA   Most Recent JCV:  NA  Most Recent Remote Hepatitis Panel:  NA  Most Recent VZV IgG:  NA  Most Recent TB Quant:  NA      CHIEF COMPLAINT: Follow up on DMT      HPI: The patient first developed symptoms of right leg numbness in 2004. This leg numbness lasted for one month and resolved without any treatment. He was evaluated and was told that this was related to stress. He did well until 2006, when he developed numbness of his right hand. This once again lasted for about a month and resolved without treatment. The patient did well until June of 2008, when he developed numbness in his right hand and face. His symptoms once again resolved without treatment. After developing the third episode of numbness, he was referred to a neurologist. He had a MRI that demonstrated multiple T2 lesions with one enhancing lesions. He underwent a spinal tap, which was positive for OCB and had an IgG index. He was therefore, diagnosed with multiple sclerosis and started on Copaxone. He had a relapse of double vision shortly after starting copaxone. HE was on copaxone from 2009 til 2010 While  on Copaxone, he continued to have relapses, Consequently, he was transitioned to Rebif in 2010 and has done well. Since starting the injection, he has tolerated the injections well.  He has had no relapses or progression of his disease.    INTERVAL HISTORY:    The patient report that he has numbness in his three finger in his right hand. The sensation is like he jammed his finger. This started in August. It spread to involve his entire right arm and part of his right chest. However over the following months, the numbness has abated just to his 3 fingers in his right hand. Nothing seems to make this better or worse. Makes it a little difficult to handle machinery at work. Otherwise he has no other issues or complaints. Patient had EMG for this which came back negative.    Issues with current MS therapy: Tolerating DMT without issue    REVIEW OF SYSTEMS:    Mood: unchanged  Spasticity:unchanged  Bladder: unchanged  Bowel: unchanged  Pain related to today's visit:reviewed on nursing intake documentation  Fatigue: unchanged  Sleep: insomnia   Memory/Concentration: unchanged    PAST HISTORY was reviewed and updated:      MEDICATIONS and ALLERGIES were reviewed and updated.    SOCIAL HISTORY was reviewed and updated:        EXAM:    PHYSICAL EXAMINATION:   VITAL SIGNS:  B/P: 127/79, T: Data Unavailable, P: 88, R: Data Unavailable    GENERAL: The patient is a well-nourished  who presents to the evaluation alone.  NEUROLOGIC:   MENTAL STATUS: Alert,awake and  oriented times four.   CRANIAL NERVES: . Visual fields are full to confrontation. The pupils are  round and react to light and there is no Blayne Diamond pupil.  Extraocular movements are  intact with no  internuclear ophthalmoplegia. No nystagmus. Facial strength and sensation are  normal. Hearing is  normal. Palate elevation and tongue protrusion are  normal.   POWER:     Motor    Upper      Right Left   Shoulder Abduction 5 5   Elbow Flexion 5 5   Elbow Extension 5 5    Wrist Extension 5 5   Digit Extension 5 5   Digit Flexion 5 5   APB 5 5   Tone 0 0   Lower       Right Left   Hip Flexion 5 5   Knee Extension 5 5   Knee Flexion 5 5   Foot Dorsiflexion 5 5   Foot Plantar Flexion 5 5   EH 5 5   Toe Flexion 5 5   Tone 0 0           Grade Description   0 No increase in muscle tone   1 Slight increase in muscle tone, manifested by a catch and release or by minimal resistance at the end of the range of motion when the affected part(s) is moved in flexion or extension   1+ Slight increase in muscle tone, manifested by a catch, followed by minimal resistance throughout the remainder (less than half) of the ROM   2 More marked increase in muscle tone through most of the ROM, but affected part(s) easily moved   3 Considerable increase in muscle tone, passive movement difficult   4 Affected part(s) rigid in flexion or extension           SENSORY:     Light touch:  intact except for the right digit 1-3 and the right palm of the right hand.        MOTOR/CEREBELLAR:    Right Left   RRM 0 Normal 0 Normal   BOBBY 0 Normal 0 Normal   FTN 0 Normal 0 Normal   RRM 0 Normal 0 Normal   HKS 0 Normal 0 Normal           GAIT: Gait is   narrow-based and steady and the patient is  able to walk on heels, toes and in tandem without difficulty.    Romberg: Stable with eye(s) closed    RESULTS:  Monitoring labs:    Orders Only on 09/21/2017   Component Date Value Ref Range Status     WBC 09/21/2017 6.9  4.0 - 11.0 10e9/L Final     RBC Count 09/21/2017 5.14  4.4 - 5.9 10e12/L Final     Hemoglobin 09/21/2017 15.4  13.3 - 17.7 g/dL Final     Hematocrit 09/21/2017 45.8  40.0 - 53.0 % Final     MCV 09/21/2017 89  78 - 100 fl Final     MCH 09/21/2017 30.0  26.5 - 33.0 pg Final     MCHC 09/21/2017 33.6  31.5 - 36.5 g/dL Final     RDW 09/21/2017 12.3  10.0 - 15.0 % Final     Platelet Count 09/21/2017 215  150 - 450 10e9/L Final     Diff Method 09/21/2017 Automated Method   Final     % Neutrophils 09/21/2017 61.3  %  Final     % Lymphocytes 09/21/2017 27.6  % Final     % Monocytes 09/21/2017 9.8  % Final     % Eosinophils 09/21/2017 0.6  % Final     % Basophils 09/21/2017 0.1  % Final     % Immature Granulocytes 09/21/2017 0.6  % Final     Nucleated RBCs 09/21/2017 0  0 /100 Final     Absolute Neutrophil 09/21/2017 4.2  1.6 - 8.3 10e9/L Final     Absolute Lymphocytes 09/21/2017 1.9  0.8 - 5.3 10e9/L Final     Absolute Monocytes 09/21/2017 0.7  0.0 - 1.3 10e9/L Final     Absolute Eosinophils 09/21/2017 0.0  0.0 - 0.7 10e9/L Final     Absolute Basophils 09/21/2017 0.0  0.0 - 0.2 10e9/L Final     Abs Immature Granulocytes 09/21/2017 0.0  0 - 0.4 10e9/L Final     Absolute Nucleated RBC 09/21/2017 0.0   Final     Bilirubin Direct 09/21/2017 0.1  0.0 - 0.2 mg/dL Final     Bilirubin Total 09/21/2017 0.3  0.2 - 1.3 mg/dL Final     Albumin 09/21/2017 3.6  3.4 - 5.0 g/dL Final     Protein Total 09/21/2017 7.4  6.8 - 8.8 g/dL Final     Alkaline Phosphatase 09/21/2017 87  40 - 150 U/L Final     ALT 09/21/2017 32  0 - 70 U/L Final     AST 09/21/2017 13  0 - 45 U/L Final     MRI brain:    MRI Dated 10/4/17:  Mild to moderate  T2 disease burden with 0 enhancion lesion(s).  compared to MRI on 2015 there are 0 new T2 lesion(s).  Questionable enlarged T2 non-enhancing lesion in the cervical spine at C1 when compared to MRI from 2010.  ASSESSMENT/PLAN:  the patient is a 32-year-old gentlemen with the past medical history of relapsing remitting multiple sclerosis who is presenting today as a follow-up. The best description of the patient's numbness is most probably a sub resolution MS relapse. The patient reports that he had been missing a few of his Rebif injections. Given the stability of his MRI at that time and the patient's desire to remain on Rebif, I will not consider this a treatment at this time and continue him on this medication. I plan to check screening labs to ensure that still safe for him to continue on this medication. In regards  to the patient's discomfort and pain, I recommend that the patient increased the gabapentin four tablets at night. I advised him to call my office and let me know how this goes. As long as he's doing well, I will have the patient follow up since 6 months.     I also spent some time today counseling the patient on reasons to change disease modifying therapy. In addition to the medication failing to control his disease, I explained to the patient that it would be reasonable to discontinue the medication if side effects became intolerable. At this time the patient did not report any issues take rebif.    Increase gabapentin to 4 300mg Tablets    Will get CBC and CMP and TSH    Will follow in 6 months.    I spent 25 minutes in this visit, with >50% direct patient time spent counseling about prognosis, treatment options, and coordination of care.    Michael Spear MD [facility]  Staff Neurologist   02/06/18

## 2018-02-06 NOTE — PROGRESS NOTES
MULTIPLE SCLEROSIS CLINIC AT THE [facility]  FOLLOWUP/ESTABLISHED PATIENT VISIT      PRINCIPAL NEUROLOGIC DIAGNOSIS: Multiple Sclerosis    Date of Onset: 2004  Date of Diagnosis: 2006  Initial Clinical Course: Relapsing Remitting  Current Clinical Course: Relapsing Remitting  Past Disease Modifying Therapy(ies): Copaxone  Current Disease Modifying Therapy(ies):Rebif  Most Recent MRI of the Brain: 10/2017  Most Recent MRI of the Cervical Cord 10/2017  Most Recent MRI of the Thoracic Cord: NA  Most Recent Lumbar Puncture: 2008  Most Recent OCT: NA   Most Recent JCV:  NA  Most Recent Remote Hepatitis Panel:  NA  Most Recent VZV IgG:  NA  Most Recent TB Quant:  NA      CHIEF COMPLAINT: Follow up on DMT      HPI: The patient first developed symptoms of right leg numbness in 2004. This leg numbness lasted for one month and resolved without any treatment. He was evaluated and was told that this was related to stress. He did well until 2006, when he developed numbness of his right hand. This once again lasted for about a month and resolved without treatment. The patient did well until June of 2008, when he developed numbness in his right hand and face. His symptoms once again resolved without treatment. After developing the third episode of numbness, he was referred to a neurologist. He had a MRI that demonstrated multiple T2 lesions with one enhancing lesions. He underwent a spinal tap, which was positive for OCB and had an IgG index. He was therefore, diagnosed with multiple sclerosis and started on Copaxone. He had a relapse of double vision shortly after starting copaxone. HE was on copaxone from 2009 til 2010 While on Copaxone, he continued to have relapses, Consequently, he was transitioned to Rebif in 2010 and has done well. Since starting the injection, he has tolerated the injections well.  He has had no relapses or progression of his disease.    INTERVAL HISTORY:    The patient report that he has  numbness in his three finger in his right hand. The sensation is like he jammed his finger. This started in August. It spread to involve his entire right arm and part of his right chest. However over the following months, the numbness has abated just to his 3 fingers in his right hand. Nothing seems to make this better or worse. Makes it a little difficult to handle machinery at work. Otherwise he has no other issues or complaints. Patient had EMG for this which came back negative.    Issues with current MS therapy: Tolerating DMT without issue    REVIEW OF SYSTEMS:    Mood: unchanged  Spasticity:unchanged  Bladder: unchanged  Bowel: unchanged  Pain related to today's visit:reviewed on nursing intake documentation  Fatigue: unchanged  Sleep: insomnia   Memory/Concentration: unchanged    PAST HISTORY was reviewed and updated:      MEDICATIONS and ALLERGIES were reviewed and updated.    SOCIAL HISTORY was reviewed and updated:        EXAM:    PHYSICAL EXAMINATION:   VITAL SIGNS:  B/P: 127/79, T: Data Unavailable, P: 88, R: Data Unavailable    GENERAL: The patient is a well-nourished  who presents to the evaluation alone.  NEUROLOGIC:   MENTAL STATUS: Alert,awake and  oriented times four.   CRANIAL NERVES: . Visual fields are full to confrontation. The pupils are  round and react to light and there is no Blayne Diamond pupil.  Extraocular movements are  intact with no  internuclear ophthalmoplegia. No nystagmus. Facial strength and sensation are  normal. Hearing is  normal. Palate elevation and tongue protrusion are  normal.   POWER:     Motor    Upper      Right Left   Shoulder Abduction 5 5   Elbow Flexion 5 5   Elbow Extension 5 5   Wrist Extension 5 5   Digit Extension 5 5   Digit Flexion 5 5   APB 5 5   Tone 0 0   Lower       Right Left   Hip Flexion 5 5   Knee Extension 5 5   Knee Flexion 5 5   Foot Dorsiflexion 5 5   Foot Plantar Flexion 5 5   EH 5 5   Toe Flexion 5 5   Tone 0 0           Grade Description   0 No  increase in muscle tone   1 Slight increase in muscle tone, manifested by a catch and release or by minimal resistance at the end of the range of motion when the affected part(s) is moved in flexion or extension   1+ Slight increase in muscle tone, manifested by a catch, followed by minimal resistance throughout the remainder (less than half) of the ROM   2 More marked increase in muscle tone through most of the ROM, but affected part(s) easily moved   3 Considerable increase in muscle tone, passive movement difficult   4 Affected part(s) rigid in flexion or extension           SENSORY:     Light touch:  intact except for the right digit 1-3 and the right palm of the right hand.        MOTOR/CEREBELLAR:    Right Left   RRM 0 Normal 0 Normal   BOBBY 0 Normal 0 Normal   FTN 0 Normal 0 Normal   RRM 0 Normal 0 Normal   HKS 0 Normal 0 Normal           GAIT: Gait is   narrow-based and steady and the patient is  able to walk on heels, toes and in tandem without difficulty.    Romberg: Stable with eye(s) closed    RESULTS:  Monitoring labs:    Orders Only on 09/21/2017   Component Date Value Ref Range Status     WBC 09/21/2017 6.9  4.0 - 11.0 10e9/L Final     RBC Count 09/21/2017 5.14  4.4 - 5.9 10e12/L Final     Hemoglobin 09/21/2017 15.4  13.3 - 17.7 g/dL Final     Hematocrit 09/21/2017 45.8  40.0 - 53.0 % Final     MCV 09/21/2017 89  78 - 100 fl Final     MCH 09/21/2017 30.0  26.5 - 33.0 pg Final     MCHC 09/21/2017 33.6  31.5 - 36.5 g/dL Final     RDW 09/21/2017 12.3  10.0 - 15.0 % Final     Platelet Count 09/21/2017 215  150 - 450 10e9/L Final     Diff Method 09/21/2017 Automated Method   Final     % Neutrophils 09/21/2017 61.3  % Final     % Lymphocytes 09/21/2017 27.6  % Final     % Monocytes 09/21/2017 9.8  % Final     % Eosinophils 09/21/2017 0.6  % Final     % Basophils 09/21/2017 0.1  % Final     % Immature Granulocytes 09/21/2017 0.6  % Final     Nucleated RBCs 09/21/2017 0  0 /100 Final     Absolute Neutrophil  09/21/2017 4.2  1.6 - 8.3 10e9/L Final     Absolute Lymphocytes 09/21/2017 1.9  0.8 - 5.3 10e9/L Final     Absolute Monocytes 09/21/2017 0.7  0.0 - 1.3 10e9/L Final     Absolute Eosinophils 09/21/2017 0.0  0.0 - 0.7 10e9/L Final     Absolute Basophils 09/21/2017 0.0  0.0 - 0.2 10e9/L Final     Abs Immature Granulocytes 09/21/2017 0.0  0 - 0.4 10e9/L Final     Absolute Nucleated RBC 09/21/2017 0.0   Final     Bilirubin Direct 09/21/2017 0.1  0.0 - 0.2 mg/dL Final     Bilirubin Total 09/21/2017 0.3  0.2 - 1.3 mg/dL Final     Albumin 09/21/2017 3.6  3.4 - 5.0 g/dL Final     Protein Total 09/21/2017 7.4  6.8 - 8.8 g/dL Final     Alkaline Phosphatase 09/21/2017 87  40 - 150 U/L Final     ALT 09/21/2017 32  0 - 70 U/L Final     AST 09/21/2017 13  0 - 45 U/L Final   ]      MRI brain:    MRI Dated 10/4/17:  Mild to moderate  T2 disease burden with 0 enhancion lesion(s).  compared to MRI on 2015 there are 0 new T2 lesion(s).      Questionable enlarged T2 non-enhancing lesion in the cervical spine at C1 when compared to MRI from 2010.      ASSESSMENT/PLAN:  the patient is a 32-year-old gentlemen with the past medical history of relapsing remitting multiple sclerosis who is presenting today as a follow-up. The best description of the patient's numbness is most probably a sub resolution MS relapse. The patient reports that he had been missing a few of his Rebif injections. Given the stability of his MRI at that time and the patient's desire to remain on Rebif, I will not consider this a treatment at this time and continue him on this medication. I plan to check screening labs to ensure that still safe for him to continue on this medication. In regards to the patient's discomfort and pain, I recommend that the patient increased the gabapentin four tablets at night. I advised him to call my office and let me know how this goes. As long as he's doing well, I will have the patient follow up since 6 months.     I also spent some  time today counseling the patient on reasons to change disease modifying therapy. In addition to the medication failing to control his disease, I explained to the patient that it would be reasonable to discontinue the medication if side effects became intolerable. At this time the patient did not report any issues take rebif.    Increase gabapentin to 4 300mg Tablets    Will get CBC and CMP and TSH    Will follow in 6 months.    I spent 25 minutes in this visit, with >50% direct patient time spent counseling about prognosis, treatment options, and coordination of care.    Michael Spear MD [facility]  Staff Neurologist   02/06/18

## 2018-02-06 NOTE — NURSING NOTE
"Chief Complaint   Patient presents with     RECHECK     [facility] RETURN - MULTIPLE SCLEROSIS       Initial /79 (BP Location: Left arm, Patient Position: Sitting, Cuff Size: Adult Large)  Pulse 88  Ht 1.854 m (6' 1\")  Wt 96.1 kg (211 lb 14.4 oz)  BMI 27.96 kg/m2 Estimated body mass index is 27.96 kg/(m^2) as calculated from the following:    Height as of this encounter: 1.854 m (6' 1\").    Weight as of this encounter: 96.1 kg (211 lb 14.4 oz).  Medication Reconciliation: complete     Marija San MA      "

## 2018-02-06 NOTE — MR AVS SNAPSHOT
After Visit Summary   2/6/2018    Darin Maier    MRN: [id number]           Patient Information     Date Of Birth          1985        Visit Information        Provider Department      2/6/2018 12:00 PM Michael Spear MD [facility] Multiple Sclerosis        Today's Diagnoses     MS (multiple sclerosis) (H)    -  1    Chronic fatigue          Care Instructions    Will get a blood test    Will have you increase your gabapentin to 4 pills at night    Will have you follow up in 6 months.    Please call my office with any questions or concerns.     You saw a neurology provider today at the [facility].  You may have also met with the MS RN Care Coordinator.  In order to get a message to your MS Center provider, you should contact [phone] option 3 for the triage nurse line.    You should contact us via this protocol if you have any of the following symptoms:    New or worsening neurologic symptoms that persist for 24-48 hours, such as:  o New onset of pain or marked worsening of pain  o Difficulty with speech, swallowing, or breathing  o New onset of vertigo or dizziness  o Change in bowel or bladder function (incontinence, difficulty urinating)    Increasing difficulty in self care    Marked changes in vision (double vision, blurred vision, graying of vision)    Change in mobility    Change in cognitive function    Falling    Worsening numbness, tingling or pain with a change in function    Worsening fatigue lasting more than 2 weeks  If you had labs completed today, we will contact you with the results.  If you are active in [employer], they will be released to you there.  Otherwise, your results will be provided to you via mail or telephone call.  Some results take up to 2 weeks for completion.  If you haven t heard anything about your lab results within 2 weeks, you can call or send a [employer] message to obtain your results.  If you have an MRI  scheduled in the week or two prior to your next appointment, we will go over the results at your scheduled follow up appointment.  If you are not scheduled to see your MS Center provider within about 2 weeks after your MRI, please call or send a [employer] message to obtain your results if you haven t heard anything within 2 weeks.  Please be aware that it takes at least 5 business days after routine MRIs for your results to be reviewed by both the radiologist and your doctor.  MRIs completed at facilities outside of the [facility] system take about 2 weeks in order for the MRI disc to be mailed to our clinic and uploaded into your medical record.    Prescription refills should be faxed to us by your pharmacy.  Our fax number for prescription refills is [phone].  Please do your best to come to your appointments, and to arrive 15 minutes early to allow time for checking in.  [facility] reserves the right to terminate care of established patients if a patient misses three or more appointments in a clinic without providing notification within a 12-month period.    Developing Your Care Team  Individuals living with chronic illnesses like MS may be unaware that they are at risk for the same range of medical problems as everyone else.  This is why you must establish a relationship with other health care providers in addition to your Multiple Sclerosis doctor.  It can be difficult at times to figure out whether a health concern is related to your MS, or whether it is related to something else, such as hormonal changes, pseduoexacerbations, changes in your core body temperature, flu-like reactions to interferons, exercise, or infections.  Urinary tract infections (UTIs) are common culprits that can cause fatigue, weakness, or other  MS attack -like symptoms without classic symptoms of a UTI.  For this reason, if you call or come in to discuss symptoms, you may be asked to get in touch with your  primary provider or another specialist, so that you receive the comprehensive care you need.  What is Multiple Sclerosis (MS)?  MS is a disease in which the nerve tissues in the brain and/or spinal cord are attacked by immune cells in the body.  These immune cells are present in everyone, and their normal role is to fight off infections.  In people with MS, these cells change the way they function and cross into the nervous system.  Once there, they cause inflammation that damages the myelin (or the protective coating of a nerve cell, much like the plastic covering on an electrical cord) and parts of the nerve cell itself.  So far, a clear cause for this immune system dysfunction has not been found.  MS often starts out as the  relapsing-remitting  form.  This means there are episodes when you have symptoms, and other times when you recover to normal or near-normal.  Over time, if the damage to the nervous system continues, the disease can cause additional disability, such as difficulty walking.  If the relapses and nerve damage can be prevented with available medications, many patients with MS can go many years between relapses and have relatively little disability.  Remember: MS is a condition that changes and must be evaluated on an ongoing basis!  What is a Relapse? (Also called flare-ups, attacks, or exacerbations)  Relapses are due to the occurrence of inflammation in some part of the brain and/or spinal cord.  A relapse is new or recurrent symptoms which persist for at least 24 hours and sometimes worsen over 48 hours.  New symptoms need to be  by at least 1 month in order to be considered separate relapses. Most of the time, symptoms reach their maximal intensity within 2 weeks and then begin to slowly resolve.  At times, your symptoms may not recover fully for up to 6 months, depending on the severity of the episode.  The frequency of relapses is generally higher early in the disease, but can vary  greatly among individuals with MS.  Improvement of symptoms for an individual is unpredictable with each relapse.    It is important to remember that an increase in symptoms and changes in function may not necessarily be a relapse.  There are other factors that contribute to such changes, such as hot weather, increased body temperature, infection/illness, stress and sleep deprivation.  The worsening of symptoms may feel like a relapse when in reality it is not.  These episodes are referred to as pseudorelapses.  Once the underlying cause is addressed, symptoms usually fade away and you feel better.  If you experience a worsening of symptoms that lasts more than 48 hours and does not improve with cooling down, decreasing stress, or treatment of an infection, please call us and we can help to better determine whether you are having a pseudorelapse versus a relapse.                Follow-ups after your visit        Follow-up notes from your care team     Return in about 6 months (around 8/6/2018).      Your next 10 appointments already scheduled     Feb 06, 2018  1:00 PM CST   LAB with  LAB   [facility] ([facility])    [address] [place] [address] Floor  [facility] [address] [phone]           Please do not eat 10-12 hours before your appointment if you are coming in fasting for labs on lipids, cholesterol, or glucose (sugar). This does not apply to pregnant women. Water, hot tea and black coffee (with nothing added) are okay. Do not drink other fluids, diet soda or chew gum.            Aug 07, 2018  1:30 PM CDT   (Arrive by 1:15 PM)   Return Multiple Sclerosis with Michael Spear MD   [facility] Multiple [facility]clerosis ([facility])    [address] [place] [address]  [address] [place] [address] [phone]              Future tests that were ordered for you today     Open Future Orders        Priority Expected Expires Ordered    Comprehensive metabolic  "panel Routine  2019            Who to contact     If you have questions or need follow up information about today's clinic visit or your schedule please contact [facility] MULTIPLE SCLEROSIS directly at [phone].  Normal or non-critical lab and imaging results will be communicated to you by [employer], letter or phone within 4 business days after the clinic has received the results. If you do not hear from us within 7 days, please contact the clinic through [employer] or phone. If you have a critical or abnormal lab result, we will notify you by phone as soon as possible.  Submit refill requests through [employer] or call your pharmacy and they will forward the refill request to us. Please allow 3 business days for your refill to be completed.          Additional Information About Your Visit        [employer] Information     [employer] lets you send messages to your doctor, view your test results, renew your prescriptions, schedule appointments and more. To sign up, go to [email or web address] . Click on \"Log in\" on the left side of the screen, which will take you to the Welcome page. Then click on \"Sign up Now\" on the right side of the page.     You will be asked to enter the access code listed below, as well as some personal information. Please follow the directions to create your username and password.     Your access code is: [email or web address]  Expires: 3/19/2018  9:40 AM     Your access code will  in 90 days. If you need help or a new code, please call your [facility] clinic or [phone].        Care EveryWhere ID     This is your Care EveryWhere ID. This could be used by other organizations to access your [facility] medical records  [id number]        Your Vitals Were     Pulse Height BMI (Body Mass Index)             88 1.854 m (6' 1\") 27.96 kg/m2          Blood Pressure from Last 3 Encounters:   18 127/79   17 126/81   17 145/82    Weight from Last 3 Encounters:   18 96.1 kg (211 " lb 14.4 oz)   01/23/17 90.8 kg (200 lb 3.2 oz)   11/06/13 94.3 kg (208 lb)              We Performed the Following     CBC with platelets differential     TSH with free T4 reflex          Today's Medication Changes          These changes are accurate as of 2/6/18 12:43 PM.  If you have any questions, ask your nurse or doctor.               Start taking these medicines.        Dose/Directions    * amphetamine-dextroamphetamine 10 MG per tablet   Commonly known as:  ADDERALL   Used for:  Chronic fatigue   Started by:  Michael Spear MD        Take 1 in am and 1 prior to 2 pm   Quantity:  60 tablet   Refills:  0       * amphetamine-dextroamphetamine 10 MG per tablet   Commonly known as:  ADDERALL   Used for:  Chronic fatigue   Started by:  Michael Spear MD        Take 1 in am and 1 prior to 2 pm   Quantity:  60 tablet   Refills:  0       * amphetamine-dextroamphetamine 10 MG per tablet   Commonly known as:  ADDERALL   Used for:  Chronic fatigue   Started by:  Michael Spear MD        Start taking on:  3/6/2018   Take 1 in am and 1 prior to 2 pm   Quantity:  60 tablet   Refills:  0       * Notice:  This list has 3 medication(s) that are the same as other medications prescribed for you. Read the directions carefully, and ask your doctor or other care provider to review them with you.         Where to get your medicines      Some of these will need a paper prescription and others can be bought over the counter.  Ask your nurse if you have questions.     Bring a paper prescription for each of these medications     amphetamine-dextroamphetamine 10 MG per tablet    amphetamine-dextroamphetamine 10 MG per tablet    amphetamine-dextroamphetamine 10 MG per tablet                Primary Care Provider Fax #    Physician No Ref-Primary [phone]       No address on file        Equal Access to Services     [facility]: rosa isela Schofield qaybta kaalmada adeegyada, waxay  joseph giraldo jarvis ramos'aan ah. So [facility] [phone].    ATENCIÓN: Si tessa ernst, tiene a orta disposición servicios gratuitos de asistencia lingüística. Dulce al [phone].    We comply with applicable federal civil rights laws and [place] laws. We do not discriminate on the basis of race, color, national origin, age, disability, sex, sexual orientation, or gender identity.            Thank you!     Thank you for choosing [facility] MULTIPLE SCLEROSIS  for your care. Our goal is always to provide you with excellent care. Hearing back from our patients is one way we can continue to improve our services. Please take a few minutes to complete the written survey that you may receive in the mail after your visit with us. Thank you!             Your Updated Medication List - Protect others around you: Learn how to safely use, store and throw away your medicines at www.disposemymeds.org.          This list is accurate as of 2/6/18 12:43 PM.  Always use your most recent med list.                   Brand Name Dispense Instructions for use Diagnosis    ALEVE 220 MG tablet   Generic drug:  naproxen sodium      Take 2 tablets by mouth daily.        * amphetamine-dextroamphetamine 10 MG per tablet    ADDERALL    60 tablet    Take 1 in am and 1 prior to 2 pm    Chronic fatigue       * amphetamine-dextroamphetamine 10 MG per tablet    ADDERALL    60 tablet    Take 1 in am and 1 prior to 2 pm    Chronic fatigue       * amphetamine-dextroamphetamine 10 MG per tablet   Start taking on:  3/6/2018    ADDERALL    60 tablet    Take 1 in am and 1 prior to 2 pm    Chronic fatigue       cyclobenzaprine 5 MG tablet    FLEXERIL    30 tablet    Take 1 tablet (5 mg) by mouth nightly as needed for muscle spasms    Right shoulder pain, unspecified chronicity       FISH OIL PO      Take 1 capsule by mouth daily.        FLUoxetine 20 MG capsule    PROzac    60 capsule    Take 1 daily for 4 weeks then 2 daily    Multiple sclerosis (H),  Depression       gabapentin 300 MG capsule    NEURONTIN    120 capsule    TAKE 1 TO 4 CAPSULES BY MOUTH AT BEDTIME    Multiple sclerosis (H), Pain       interferon beta-1a 44 MCG/0.5ML injection    REBIF    36 Syringe    Inject 0.5 mLs (44 mcg) Subcutaneous three times a week    Multiple sclerosis (H)       VITAMIN D PO      Take 1 capsule by mouth daily. 5000 IU        * Notice:  This list has 3 medication(s) that are the same as other medications prescribed for you. Read the directions carefully, and ask your doctor or other care provider to review them with you.

## 2018-02-13 DIAGNOSIS — G35 MULTIPLE SCLEROSIS (H): ICD-10-CM

## 2018-02-13 NOTE — TELEPHONE ENCOUNTER
Received refill request for Rebif from [facility]; Patient was last seen in February and has follow up appointment in August with Dr. Spear; Refilled for 1 year per MS refill protocol.    Airam Cazares MS RN Care Coordinator

## 2018-02-14 NOTE — TELEPHONE ENCOUNTER
Per call center: Call from PT as his Rx was sent to the wrong pharmacy.  The request was made by [facility] and the [facility]  PT spoke with them and they cannot transfer the Rx to [facility] for the PT.  They told him he needs to call us and have it re-sent.  PT is requesting that this and ALL prescriptions going forward be sent to [facility].  Also, please update the PT's record to show this info in medications.  Call PT at [phone] with a status update.     Called patient to let him know. He states that [facility] is supposed to fill the Rx, NOT [facility] pharmacy. Rx sent to [facility]. Called [facility] to make sure they have the order. Everything is good to go.

## 2018-05-22 DIAGNOSIS — R53.82 CHRONIC FATIGUE: ICD-10-CM

## 2018-05-22 NOTE — TELEPHONE ENCOUNTER
Health Call Center    Phone Message    May a detailed message be left on voicemail: no    Reason for Call: Medication Refill Request    Has the patient contacted the pharmacy for the refill? Yes   Name of medication being requested: amphetamine-dextroamphetamine (ADDERALL) 10 MG per tablet  Provider who prescribed the medication: Dr. Michael Spear  Pharmacy: [facility]  Date medication is needed: ASAP, Pt is OUT  Comments: Pt would like to first try e-filing the refill to [facility], then if that's not possible to please mail the script to his address on file.      Action Taken: Message routed to:  [facility]

## 2018-05-22 NOTE — TELEPHONE ENCOUNTER
Patient called requesting refill of their Adderall; Patient was last seen in February and has follow up appointment in August with Dr. Spear; Pended rx to Dr. Spear for signature and will mail to the home once signed.    Airam Cazares MS RN Care Coordinator

## 2018-05-23 RX ORDER — DEXTROAMPHETAMINE SACCHARATE, AMPHETAMINE ASPARTATE, DEXTROAMPHETAMINE SULFATE AND AMPHETAMINE SULFATE 2.5; 2.5; 2.5; 2.5 MG/1; MG/1; MG/1; MG/1
TABLET ORAL
Qty: 60 TABLET | Refills: 0 | Status: SHIPPED | OUTPATIENT
Start: 2018-05-23 | End: 2018-07-24

## 2018-05-23 RX ORDER — DEXTROAMPHETAMINE SACCHARATE, AMPHETAMINE ASPARTATE, DEXTROAMPHETAMINE SULFATE AND AMPHETAMINE SULFATE 2.5; 2.5; 2.5; 2.5 MG/1; MG/1; MG/1; MG/1
TABLET ORAL
Qty: 60 TABLET | Refills: 0 | Status: SHIPPED | OUTPATIENT
Start: 2018-06-23 | End: 2018-07-24

## 2018-05-23 NOTE — TELEPHONE ENCOUNTER
2 signed Adderall Rxs placed in mail to patient's home. Left VMM letting patient know these scripts need to be hard copy and have been mailed.

## 2018-06-06 DIAGNOSIS — R52 PAIN: ICD-10-CM

## 2018-06-06 DIAGNOSIS — G35 MULTIPLE SCLEROSIS (H): ICD-10-CM

## 2018-06-07 RX ORDER — GABAPENTIN 300 MG/1
CAPSULE ORAL
Qty: 120 CAPSULE | Refills: 4 | Status: SHIPPED | OUTPATIENT
Start: 2018-06-07 | End: 2019-01-07

## 2018-06-07 NOTE — TELEPHONE ENCOUNTER
Received refill request for gabapentin from [facility]; Patient was last seen in February and has follow up appointment in August with Dr. Spear. Refilled per MS refill protocol.    Roberta Simon, RN Care Coordinator

## 2018-07-23 DIAGNOSIS — R53.82 CHRONIC FATIGUE: ICD-10-CM

## 2018-07-23 NOTE — TELEPHONE ENCOUNTER
Health Call Center    Phone Message    May a detailed message be left on voicemail: yes    Reason for Call: Medication Question or concern regarding medication   Prescription Clarification  Name of Medication: amphetamine-dextroamphetamine (ADDERALL) 10 MG per tablet  Prescribing Provider: Dr. Spear   What on the order needs clarification? Patient would like a hard copy form for this Rx sent to him at his home address. He would like to bring it to his PCP. Please call with any questions.           Action Taken: Message routed to:  [facility] Neurology

## 2018-07-24 RX ORDER — DEXTROAMPHETAMINE SACCHARATE, AMPHETAMINE ASPARTATE, DEXTROAMPHETAMINE SULFATE AND AMPHETAMINE SULFATE 2.5; 2.5; 2.5; 2.5 MG/1; MG/1; MG/1; MG/1
TABLET ORAL
Qty: 60 TABLET | Refills: 0 | Status: SHIPPED | OUTPATIENT
Start: 2018-07-24 | End: 2018-09-20

## 2018-07-24 RX ORDER — DEXTROAMPHETAMINE SACCHARATE, AMPHETAMINE ASPARTATE, DEXTROAMPHETAMINE SULFATE AND AMPHETAMINE SULFATE 2.5; 2.5; 2.5; 2.5 MG/1; MG/1; MG/1; MG/1
TABLET ORAL
Qty: 60 TABLET | Refills: 0 | Status: SHIPPED | OUTPATIENT
Start: 2018-08-24 | End: 2018-09-20

## 2018-07-24 NOTE — TELEPHONE ENCOUNTER
Prescriptions placed in the mail to go out to the patient's home today.    Airam Cazares, MS RN Care Coordinator

## 2018-09-20 ENCOUNTER — DOCUMENTATION ONLY (OUTPATIENT)
Dept: NEUROLOGY | Facility: CLINIC | Age: 33
End: 2018-09-20

## 2018-09-20 DIAGNOSIS — R53.82 CHRONIC FATIGUE: ICD-10-CM

## 2018-09-20 RX ORDER — DEXTROAMPHETAMINE SACCHARATE, AMPHETAMINE ASPARTATE, DEXTROAMPHETAMINE SULFATE AND AMPHETAMINE SULFATE 2.5; 2.5; 2.5; 2.5 MG/1; MG/1; MG/1; MG/1
TABLET ORAL
Qty: 60 TABLET | Refills: 0 | Status: SHIPPED | OUTPATIENT
Start: 2018-09-20 | End: 2018-10-23

## 2018-09-20 RX ORDER — DEXTROAMPHETAMINE SACCHARATE, AMPHETAMINE ASPARTATE, DEXTROAMPHETAMINE SULFATE AND AMPHETAMINE SULFATE 2.5; 2.5; 2.5; 2.5 MG/1; MG/1; MG/1; MG/1
TABLET ORAL
Qty: 60 TABLET | Refills: 0 | Status: SHIPPED | OUTPATIENT
Start: 2018-10-20 | End: 2018-10-23

## 2018-09-20 NOTE — PROGRESS NOTES
I authorized prescription for Adderall 10 mg, quantity 60 with one refill (additional prescription signed) for this patient of Dr. Spear's as he is away from the clinic this week and unavailable to sign prescriptions.

## 2018-09-20 NOTE — TELEPHONE ENCOUNTER
[facility]    Phone Message    May a detailed message be left on voicemail: yes    Reason for Call: Other: Pt requesting that a hard copy script for amphetamine-dextroamphetamine (ADDERALL) be mailed to his home.     Action Taken: Message routed to:  [facility]

## 2018-09-20 NOTE — TELEPHONE ENCOUNTER
Patient called requesting refill of their Adderall; Patient was last seen in February and has no follow up appointment with Dr. Spear; Pended rx to Dr. Leyva for signature, on behalf of Dr. Spear, and will mail to the pharmacy once signed.    Airam Cazares MS RN Care Coordinator

## 2018-10-23 DIAGNOSIS — R53.82 CHRONIC FATIGUE: ICD-10-CM

## 2018-10-23 RX ORDER — DEXTROAMPHETAMINE SACCHARATE, AMPHETAMINE ASPARTATE, DEXTROAMPHETAMINE SULFATE AND AMPHETAMINE SULFATE 2.5; 2.5; 2.5; 2.5 MG/1; MG/1; MG/1; MG/1
TABLET ORAL
Qty: 60 TABLET | Refills: 0 | Status: SHIPPED | OUTPATIENT
Start: 2018-11-20 | End: 2019-01-15

## 2018-10-23 RX ORDER — DEXTROAMPHETAMINE SACCHARATE, AMPHETAMINE ASPARTATE, DEXTROAMPHETAMINE SULFATE AND AMPHETAMINE SULFATE 2.5; 2.5; 2.5; 2.5 MG/1; MG/1; MG/1; MG/1
TABLET ORAL
Qty: 60 TABLET | Refills: 0 | Status: SHIPPED | OUTPATIENT
Start: 2018-12-20 | End: 2019-11-11

## 2018-10-23 NOTE — TELEPHONE ENCOUNTER
Patient called requesting refill of their Adderall prescriptions; Patient was last seen in February by Dr. Spear and has no follow up appointment with Dr. Spear; Pended rx to Dr. Spear for signature and will mail to the pharmacy once signed; I have asked our clinic coordinators to give the patient a call to arrange a follow up appointment.    Airam Cazares, MS RN Care Coordinator

## 2018-10-23 NOTE — TELEPHONE ENCOUNTER
[facility] Call [facility]    Phone Message    May a detailed message be left on voicemail: yes    Reason for Call: Other: amphetamine-dextroamphetamine (ADDERALL) 10 MG per tablet. Per call from PT is requesting RX mail out to his home address. PT can be reached at the above # with any questions     Action Taken: Message routed to:  [facility] Neurology

## 2018-10-24 ENCOUNTER — TELEPHONE (OUTPATIENT)
Dept: NEUROLOGY | Facility: CLINIC | Age: 33
End: 2018-10-24

## 2018-10-24 NOTE — TELEPHONE ENCOUNTER
[facility]    Phone Message    May a detailed message be left on voicemail: yes    Reason for Call: Other: PT is following up regarding the Adderall refill he had requested. He realized he did still have a paper Rx for one more refill, so he's not needing a refill quite yet. He was wondering if it would be ok to push his yearly f/u visit out a bit further since he doesn't need that refill yet. Pt states it's hard for him to get off work this time of year. He said in the past he had done his yearly visit in February and is wanting to know if it would be ok if he rescheduled for then.     Action Taken: Message routed to:  [facility] Neurology

## 2018-10-25 NOTE — TELEPHONE ENCOUNTER
I left [facility] for patient advising that his prescriptions that were mailed to him are post-dated for November and December; I also advised that he is welcome to move his appointment back.    Airam Cazares, MS RN Care Coordinator

## 2019-01-07 DIAGNOSIS — G35 MULTIPLE SCLEROSIS (H): ICD-10-CM

## 2019-01-07 DIAGNOSIS — R52 PAIN: ICD-10-CM

## 2019-01-07 RX ORDER — GABAPENTIN 300 MG/1
CAPSULE ORAL
Qty: 120 CAPSULE | Refills: 11 | Status: SHIPPED | OUTPATIENT
Start: 2019-01-07 | End: 2020-01-15

## 2019-01-07 NOTE — TELEPHONE ENCOUNTER
Received refill request for Gabapentin from [facility]; Patient was last seen in February and has follow up appointment in February 2019 with Dr. Spear; Refilled for 1 year per MS refill protocol.    Airam Cazares MS RN Care Coordinator

## 2019-01-15 DIAGNOSIS — R53.82 CHRONIC FATIGUE: ICD-10-CM

## 2019-01-15 RX ORDER — DEXTROAMPHETAMINE SACCHARATE, AMPHETAMINE ASPARTATE, DEXTROAMPHETAMINE SULFATE AND AMPHETAMINE SULFATE 2.5; 2.5; 2.5; 2.5 MG/1; MG/1; MG/1; MG/1
TABLET ORAL
Qty: 60 TABLET | Refills: 0 | Status: SHIPPED | OUTPATIENT
Start: 2019-01-15 | End: 2019-11-11

## 2019-01-15 NOTE — TELEPHONE ENCOUNTER
Health Call Center    Phone Message    May a detailed message be left on voicemail: yes    Reason for Call: Medication Refill Request    Has the patient contacted the pharmacy for the refill? Yes   Name of medication being requested: amphetamine-dextroamphetamine (ADDERALL) 10 MG per tablet  Provider who prescribed the medication: Dr. Herbert Spear  Pharmacy: n/a, mail Rx to patient  Date medication is needed: 01/18         Action Taken: Message routed to:  [facility] Neurology

## 2019-01-15 NOTE — TELEPHONE ENCOUNTER
Patient called requesting refill of their Adderall; Patient was last seen in February 2018 and has follow up appointment in February 2019 with Dr. Spear; Pended rx to Dr. Spear for signature and will mail to the home once signed.    Airam Cazares MS RN Care Coordinator

## 2019-02-05 ENCOUNTER — OFFICE VISIT (OUTPATIENT)
Dept: NEUROLOGY | Facility: CLINIC | Age: 34
End: 2019-02-05
Attending: PSYCHIATRY & NEUROLOGY
Payer: COMMERCIAL

## 2019-02-05 ENCOUNTER — APPOINTMENT (OUTPATIENT)
Dept: LAB | Facility: CLINIC | Age: 34
End: 2019-02-05
Payer: COMMERCIAL

## 2019-02-05 VITALS
HEIGHT: 73 IN | HEART RATE: 82 BPM | SYSTOLIC BLOOD PRESSURE: 128 MMHG | DIASTOLIC BLOOD PRESSURE: 85 MMHG | BODY MASS INDEX: 28.97 KG/M2 | WEIGHT: 218.6 LBS

## 2019-02-05 DIAGNOSIS — G35 MULTIPLE SCLEROSIS (H): Primary | ICD-10-CM

## 2019-02-05 DIAGNOSIS — K59.01 SLOW TRANSIT CONSTIPATION: ICD-10-CM

## 2019-02-05 LAB
ALBUMIN SERPL-MCNC: 4 G/DL (ref 3.4–5)
ALP SERPL-CCNC: 93 U/L (ref 40–150)
ALT SERPL W P-5'-P-CCNC: 59 U/L (ref 0–70)
ANION GAP SERPL CALCULATED.3IONS-SCNC: 4 MMOL/L (ref 3–14)
AST SERPL W P-5'-P-CCNC: 26 U/L (ref 0–45)
BASOPHILS # BLD AUTO: 0 10E9/L (ref 0–0.2)
BASOPHILS NFR BLD AUTO: 0.2 %
BILIRUB SERPL-MCNC: 0.4 MG/DL (ref 0.2–1.3)
BUN SERPL-MCNC: 15 MG/DL (ref 7–30)
CALCIUM SERPL-MCNC: 8.6 MG/DL (ref 8.5–10.1)
CHLORIDE SERPL-SCNC: 105 MMOL/L (ref 94–109)
CO2 SERPL-SCNC: 29 MMOL/L (ref 20–32)
CREAT SERPL-MCNC: 0.79 MG/DL (ref 0.66–1.25)
DEPRECATED CALCIDIOL+CALCIFEROL SERPL-MC: 37 UG/L (ref 20–75)
DIFFERENTIAL METHOD BLD: NORMAL
EOSINOPHIL # BLD AUTO: 0 10E9/L (ref 0–0.7)
EOSINOPHIL NFR BLD AUTO: 0.2 %
ERYTHROCYTE [DISTWIDTH] IN BLOOD BY AUTOMATED COUNT: 12.6 % (ref 10–15)
GFR SERPL CREATININE-BSD FRML MDRD: >90 ML/MIN/{1.73_M2}
GLUCOSE SERPL-MCNC: 93 MG/DL (ref 70–99)
HCT VFR BLD AUTO: 48.5 % (ref 40–53)
HGB BLD-MCNC: 15.8 G/DL (ref 13.3–17.7)
IMM GRANULOCYTES # BLD: 0 10E9/L (ref 0–0.4)
IMM GRANULOCYTES NFR BLD: 0.2 %
LYMPHOCYTES # BLD AUTO: 0.9 10E9/L (ref 0.8–5.3)
LYMPHOCYTES NFR BLD AUTO: 19.7 %
MCH RBC QN AUTO: 29.5 PG (ref 26.5–33)
MCHC RBC AUTO-ENTMCNC: 32.6 G/DL (ref 31.5–36.5)
MCV RBC AUTO: 91 FL (ref 78–100)
MONOCYTES # BLD AUTO: 0.4 10E9/L (ref 0–1.3)
MONOCYTES NFR BLD AUTO: 8.6 %
NEUTROPHILS # BLD AUTO: 3.3 10E9/L (ref 1.6–8.3)
NEUTROPHILS NFR BLD AUTO: 71.1 %
NRBC # BLD AUTO: 0 10*3/UL
NRBC BLD AUTO-RTO: 0 /100
PLATELET # BLD AUTO: 203 10E9/L (ref 150–450)
POTASSIUM SERPL-SCNC: 4.1 MMOL/L (ref 3.4–5.3)
PROT SERPL-MCNC: 8 G/DL (ref 6.8–8.8)
RBC # BLD AUTO: 5.36 10E12/L (ref 4.4–5.9)
SODIUM SERPL-SCNC: 138 MMOL/L (ref 133–144)
TSH SERPL DL<=0.005 MIU/L-ACNC: 1.38 MU/L (ref 0.4–4)
VIT B12 SERPL-MCNC: 616 PG/ML (ref 193–986)
WBC # BLD AUTO: 4.7 10E9/L (ref 4–11)

## 2019-02-05 PROCEDURE — 82306 VITAMIN D 25 HYDROXY: CPT | Performed by: PSYCHIATRY & NEUROLOGY

## 2019-02-05 PROCEDURE — 36415 COLL VENOUS BLD VENIPUNCTURE: CPT | Performed by: PSYCHIATRY & NEUROLOGY

## 2019-02-05 PROCEDURE — 85025 COMPLETE CBC W/AUTO DIFF WBC: CPT | Performed by: PSYCHIATRY & NEUROLOGY

## 2019-02-05 PROCEDURE — 84207 ASSAY OF VITAMIN B-6: CPT | Performed by: PSYCHIATRY & NEUROLOGY

## 2019-02-05 PROCEDURE — 84443 ASSAY THYROID STIM HORMONE: CPT | Performed by: PSYCHIATRY & NEUROLOGY

## 2019-02-05 PROCEDURE — 80053 COMPREHEN METABOLIC PANEL: CPT | Performed by: PSYCHIATRY & NEUROLOGY

## 2019-02-05 PROCEDURE — G0463 HOSPITAL OUTPT CLINIC VISIT: HCPCS | Mod: ZF

## 2019-02-05 PROCEDURE — 82607 VITAMIN B-12: CPT | Performed by: PSYCHIATRY & NEUROLOGY

## 2019-02-05 RX ORDER — DEXTROAMPHETAMINE SACCHARATE, AMPHETAMINE ASPARTATE, DEXTROAMPHETAMINE SULFATE AND AMPHETAMINE SULFATE 5; 5; 5; 5 MG/1; MG/1; MG/1; MG/1
20 TABLET ORAL DAILY
Qty: 30 TABLET | Refills: 0 | Status: SHIPPED | OUTPATIENT
Start: 2019-04-08 | End: 2019-05-08

## 2019-02-05 RX ORDER — DEXTROAMPHETAMINE SACCHARATE, AMPHETAMINE ASPARTATE, DEXTROAMPHETAMINE SULFATE AND AMPHETAMINE SULFATE 5; 5; 5; 5 MG/1; MG/1; MG/1; MG/1
20 TABLET ORAL DAILY
Qty: 30 TABLET | Refills: 0 | Status: SHIPPED | OUTPATIENT
Start: 2019-02-05 | End: 2019-03-07

## 2019-02-05 RX ORDER — DEXTROAMPHETAMINE SACCHARATE, AMPHETAMINE ASPARTATE, DEXTROAMPHETAMINE SULFATE AND AMPHETAMINE SULFATE 5; 5; 5; 5 MG/1; MG/1; MG/1; MG/1
20 TABLET ORAL DAILY
Qty: 30 TABLET | Refills: 0 | Status: SHIPPED | OUTPATIENT
Start: 2019-03-08 | End: 2019-05-20

## 2019-02-05 RX ORDER — METHYLPREDNISOLONE 4 MG
TABLET, DOSE PACK ORAL
Qty: 21 TABLET | Refills: 0 | Status: SHIPPED | OUTPATIENT
Start: 2019-02-05 | End: 2019-08-06

## 2019-02-05 ASSESSMENT — MIFFLIN-ST. JEOR: SCORE: 1985.44

## 2019-02-05 ASSESSMENT — PAIN SCALES - GENERAL: PAINLEVEL: SEVERE PAIN (6)

## 2019-02-05 NOTE — PATIENT INSTRUCTIONS
Take medrol dose stephany as directed    Will get a MRI of brain    Will get a CT scan of the abdoemen    Will get  Blood work    Will follow you in 6 month    You saw a neurology provider today at the [facility].  You may have also met with the MS RN Care Coordinator.  In order to get a message to your MS Center provider, you should contact [phone] option [phone] for the triage nurse line.    You should contact us via this protocol if you have any of the following symptoms:    New or worsening neurologic symptoms that persist for 24-48 hours, such as:  o New onset of pain or marked worsening of pain  o Difficulty with speech, swallowing, or breathing  o New onset of vertigo or dizziness  o Change in bowel or bladder function (incontinence, difficulty urinating)    Increasing difficulty in self care    Marked changes in vision (double vision, blurred vision, graying of vision)    Change in mobility    Change in cognitive function    Falling    Worsening numbness, tingling or pain with a change in function    Worsening fatigue lasting more than 2 weeks  If you had labs completed today, we will contact you with the results.  If you are active in [facility], they will be released to you there.  Otherwise, your results will be provided to you via mail or telephone call.  Some results take up to 2 weeks for completion.  If you haven t heard anything about your lab results within 2 weeks, you can call or send a [facility] message to obtain your results.  If you have an MRI scheduled in the week or two prior to your next appointment, we will go over the results at your scheduled follow up appointment.  If you are not scheduled to see your MS Center provider within about 2 weeks after your MRI, please call or send a [facility] message to obtain your results if you haven t heard anything within 2 weeks.  Please be aware that it takes at least 5 business days after routine MRIs for your results to be  reviewed by both the radiologist and your doctor.  MRIs completed at facilities outside of the [facility] system take about 2 weeks in order for the MRI disc to be mailed to our clinic and uploaded into your medical record.    Prescription refills should be faxed to us by your pharmacy.  Our fax number for prescription refills is [phone].  Please do your best to come to your appointments, and to arrive 15 minutes early to allow time for checking in.  [facility] reserves the right to terminate care of established patients if a patient misses three or more appointments in a clinic without providing notification within a 12-month period.    Developing Your Care Team  Individuals living with chronic illnesses like MS may be unaware that they are at risk for the same range of medical problems as everyone else.  This is why you must establish a relationship with other health care providers in addition to your Multiple Sclerosis doctor.  It can be difficult at times to figure out whether a health concern is related to your MS, or whether it is related to something else, such as hormonal changes, pseduoexacerbations, changes in your core body temperature, flu-like reactions to interferons, exercise, or infections.  Urinary tract infections (UTIs) are common culprits that can cause fatigue, weakness, or other  MS attack -like symptoms without classic symptoms of a UTI.  For this reason, if you call or come in to discuss symptoms, you may be asked to get in touch with your primary provider or another specialist, so that you receive the comprehensive care you need.  What is Multiple Sclerosis (MS)?  MS is a disease in which the nerve tissues in the brain and/or spinal cord are attacked by immune cells in the body.  These immune cells are present in everyone, and their normal role is to fight off infections.  In people with MS, these cells change the way they function and cross into the nervous  system.  Once there, they cause inflammation that damages the myelin (or the protective coating of a nerve cell, much like the plastic covering on an electrical cord) and parts of the nerve cell itself.  So far, a clear cause for this immune system dysfunction has not been found.  MS often starts out as the  relapsing-remitting  form.  This means there are episodes when you have symptoms, and other times when you recover to normal or near-normal.  Over time, if the damage to the nervous system continues, the disease can cause additional disability, such as difficulty walking.  If the relapses and nerve damage can be prevented with available medications, many patients with MS can go many years between relapses and have relatively little disability.  Remember: MS is a condition that changes and must be evaluated on an ongoing basis!  What is a Relapse? (Also called flare-ups, attacks, or exacerbations)  Relapses are due to the occurrence of inflammation in some part of the brain and/or spinal cord.  A relapse is new or recurrent symptoms which persist for at least 24 hours and sometimes worsen over 48 hours.  New symptoms need to be  by at least 1 month in order to be considered separate relapses. Most of the time, symptoms reach their maximal intensity within 2 weeks and then begin to slowly resolve.  At times, your symptoms may not recover fully for up to 6 months, depending on the severity of the episode.  The frequency of relapses is generally higher early in the disease, but can vary greatly among individuals with MS.  Improvement of symptoms for an individual is unpredictable with each relapse.    It is important to remember that an increase in symptoms and changes in function may not necessarily be a relapse.  There are other factors that contribute to such changes, such as hot weather, increased body temperature, infection/illness, stress and sleep deprivation.  The worsening of symptoms may feel like  a relapse when in reality it is not.  These episodes are referred to as pseudorelapses.  Once the underlying cause is addressed, symptoms usually fade away and you feel better.  If you experience a worsening of symptoms that lasts more than 48 hours and does not improve with cooling down, decreasing stress, or treatment of an infection, please call us and we can help to better determine whether you are having a pseudorelapse versus a relapse.

## 2019-02-05 NOTE — LETTER
2/5/2019       RE: Darin Maier  [address]  [place] [address]     Dear Colleague,    Thank you for referring your patient, Darin Maier, to the [facility] at [facility]. Please see a copy of my visit note below.    MULTIPLE SCLEROSIS CLINIC AT THE [facility]  FOLLOWUP/ESTABLISHED PATIENT VISIT    PRINCIPAL NEUROLOGIC DIAGNOSIS: Multiple Sclerosis     Date of Onset: 2004  Date of Diagnosis: 2006  Initial Clinical Course: Relapsing Remitting  Current Clinical Course: Relapsing Remitting  Past Disease Modifying Therapy(ies): Copaxone  Current Disease Modifying Therapy(ies):Rebif  Most Recent MRI of the Brain: 10/2017  Most Recent MRI of the Cervical Cord 10/2017  Most Recent MRI of the Thoracic Cord: NA  Most Recent Lumbar Puncture: 2008  Most Recent OCT: NA   Most Recent JCV:  NA  Most Recent Remote Hepatitis Panel:  NA  Most Recent VZV IgG:  NA  Most Recent TB Quant:  NA     CHIEF COMPLAINT: Follow up on DMT    INTERVAL HISTORY:    The patient reports that he is getting intermittent feeling that he is getting a clugged ear ever so often. This will go in and out. Sometimes, it can due ear ache or itching. He reports that he has had been having the ear clogging sensation for  The past 6 months. The patient can not think of anything that makes this better or worse.     The patient has issues with being bloated. The patient reports that he had been told that he has had divierticulitis. The patient reports that he is having difficult deficating. The patient reports that he can get some blood in his stool. He is having also having abdominal pain as well. This is getting worse over time.    He still has some numbness and tingling in his hands    Issues with current MS therapy: Tolerating DMT without issue    REVIEW OF SYSTEMS:    Mood: unchanged and anxious  Spasticity:unchanged  Bladder: frequency and urgency   Bowel: worse and  constipation  Pain related to today's visit:reviewed on nursing intake documentation  Fatigue: unchanged  Sleep: interrupted, unchanged and depends on the gut issue  Memory/Concentration: unchanged    Otherwise 10 point ROS was neg other than the symptoms noted above.    PAST HISTORY was reviewed and updated:    MEDICATIONS and ALLERGIES were reviewed and updated.    SOCIAL HISTORY was reviewed and updated:    EXAM:    PHYSICAL EXAMINATION:   VITAL SIGNS:  B/P: 128/85, T: Data Unavailable, P: 82, R: Data Unavailable    GENERAL: The patient is a well-nourished  who presents to the evaluation alone.  NEUROLOGIC:   MENTAL STATUS: Alert,awake and  oriented times four.   CRANIAL NERVES: . Visual fields are full to confrontation. The pupils are  round and react to light and there is no Blayne Diamond pupil.  Extraocular movements are  intact with no  internuclear ophthalmoplegia. No nystagmus. Facial strength and sensation are  normal. Hearing is  normal. Palate elevation and tongue protrusion are  normal.   POWER:      Motor     Upper         Right Left   Shoulder Abduction 5 5   Elbow Flexion 5 5   Elbow Extension 5 5   Wrist Extension 5 5   Digit Extension 5 5   Digit Flexion 5 5   APB 5 5   Tone 0 0   Lower          Right Left   Hip Flexion 5 5   Knee Extension 5 5   Knee Flexion 5 5   Foot Dorsiflexion 5 5   Foot Plantar Flexion 5 5   EH 5 5   Toe Flexion 5 5   Tone 0 0             Grade Description   0 No increase in muscle tone   1 Slight increase in muscle tone, manifested by a catch and release or by minimal resistance at the end of the range of motion when the affected part(s) is moved in flexion or extension   1+ Slight increase in muscle tone, manifested by a catch, followed by minimal resistance throughout the remainder (less than half) of the ROM   2 More marked increase in muscle tone through most of the ROM, but affected part(s) easily moved   3 Considerable increase in muscle tone, passive movement difficult    4 Affected part(s) rigid in flexion or extension               SENSORY:      Light touch:  intact except for the right digit 1-3 and the right palm of the right hand.        MOTOR/CEREBELLAR:    Right Left   RRM 1 Abnormal 0 Normal   BOBBY 1 Abnormal 0 Normal   FTN 0 Normal 0 Normal   RRM 0 Normal 0 Normal   HKS 0 Normal 0 Normal       GAIT: Gait is   narrow-based and steady and the patient is  able to walk on heels, toes and in tandem without difficulty.    Romberg: Stable with eye(s) closed    RESULTS:  Monitoring labs:    No visits with results within 6 Month(s) from this visit.   Latest known visit with results is:   Orders Only on 02/06/2018   Component Date Value Ref Range Status     Sodium 02/06/2018 140  133 - 144 mmol/L Final     Potassium 02/06/2018 3.9  3.4 - 5.3 mmol/L Final     Chloride 02/06/2018 105  94 - 109 mmol/L Final     Carbon Dioxide 02/06/2018 28  20 - 32 mmol/L Final     Anion Gap 02/06/2018 6  3 - 14 mmol/L Final     Glucose 02/06/2018 94  70 - 99 mg/dL Final     Urea Nitrogen 02/06/2018 15  7 - 30 mg/dL Final     Creatinine 02/06/2018 0.79  0.66 - 1.25 mg/dL Final     GFR Estimate 02/06/2018 >90  >60 mL/min/1.7m2 Final     GFR Estimate If Black 02/06/2018 >90  >60 mL/min/1.7m2 Final     Calcium 02/06/2018 9.0  8.5 - 10.1 mg/dL Final     Bilirubin Total 02/06/2018 0.2  0.2 - 1.3 mg/dL Final     Albumin 02/06/2018 4.2  3.4 - 5.0 g/dL Final     Protein Total 02/06/2018 8.0  6.8 - 8.8 g/dL Final     Alkaline Phosphatase 02/06/2018 92  40 - 150 U/L Final     ALT 02/06/2018 42  0 - 70 U/L Final     AST 02/06/2018 21  0 - 45 U/L Final       MRI brain:    no new MRI to review    ASSESSMENT/PLAN:  The patient is a 33-year-old gentleman with a past medical history of relapsing remitting multiple sclerosis overall, the patient has had some mild worsening of his symptoms over the past 6 months.  His symptoms of ear  itching and tingling could be secondary to either a old lesion in his shai or  potentially a new one.  I will get an MRI of the brain to determine whether or not he has had progression of his disease.  In regards to his constipation. I feel that this is more likely than not secondary to his multiple lesions in the spinal cord.  I explained to him that there are many different ways in order to treat constipation in MS.  The patient did not really have much success with MiraLAX due to increased stomach pain.  The patient does carry a history of diverticulitis and wanted to try a short course of steroids.  I told him that that probably would not work, but this is a relatively low risk.  I advised him of the risk and benefits of this. I discussed the use of probiotics to potentially help with the patient's symptoms.  I  also spent time educating the patient about potential treatments for his bladder symptoms.  I will get a CT of his abdomen and pelvis to determine whether or not he has  diverticulitis  or not.  Depending on the results of this testing, I may refer him to gastroenterology.  If all this workup is unrevealing and he does not have any relapse, I will continue him on Rebif.  However, if he does have new lesions, then serious consideration should be given to switching disease modifying therapy.  I will check screening blood work to assure that he is not having any side effects from Rebif.  I will tentatively follow the patient up in 2 months.  Instructed him to call my office with any questions problems issues or concerns.    MRI of the brain  CT abdomen and pelvis  Start Medrol Dosepak  Check CBC, CMP, TSH    Follow-up in 6 months    Patient Health Education Discussed at Visit: I discussed healthy diet with the patient and I also reviewed the potential use of probiotics with the patient.    I spent 25 minutes in this visit, with >50% direct patient time spent counseling about prognosis, treatment options, and coordination of care.    Michael Spear MD [facility]  Staff Neurologist    02/05/19     (Chart documentation was completed in part with Dragon voice-recognition software. Even though reviewed, some grammatical, spelling, and word errors may remain.)

## 2019-02-05 NOTE — PROGRESS NOTES
MULTIPLE SCLEROSIS CLINIC AT THE [facility]  FOLLOWUP/ESTABLISHED PATIENT VISIT             PRINCIPAL NEUROLOGIC DIAGNOSIS: Multiple Sclerosis     Date of Onset: 2004  Date of Diagnosis: 2006  Initial Clinical Course: Relapsing Remitting  Current Clinical Course: Relapsing Remitting  Past Disease Modifying Therapy(ies): Copaxone  Current Disease Modifying Therapy(ies):Rebif  Most Recent MRI of the Brain: 10/2017  Most Recent MRI of the Cervical Cord 10/2017  Most Recent MRI of the Thoracic Cord: NA  Most Recent Lumbar Puncture: 2008  Most Recent OCT: NA   Most Recent JCV:  NA  Most Recent Remote Hepatitis Panel:  NA  Most Recent VZV IgG:  NA  Most Recent TB Quant:  NA       CHIEF COMPLAINT: Follow up on DMT      INTERVAL HISTORY:    The patient reports that he is getting intermittent feeling that he is getting a clugged ear ever so often. This will go in and out. Sometimes, it can due ear ache or itching. He reports that he has had been having the ear clogging sensation for  The past 6 months. The patient can not think of anything that makes this better or worse.       The patient has issues with being bloated. The patient reports that he had been told that he has had divierticulitis. The patient reports that he is having difficult deficating. The patient reports that he can get some blood in his stool. He is having also having abdominal pain as well. This is getting worse over time.      He still has some numbness and tingling in his hands    Issues with current MS therapy: Tolerating DMT without issue    REVIEW OF SYSTEMS:    Mood: unchanged and anxious  Spasticity:unchanged  Bladder: frequency and urgency   Bowel: worse and constipation  Pain related to today's visit:reviewed on nursing intake documentation  Fatigue: unchanged  Sleep: interrupted, unchanged and depends on the gut issue  Memory/Concentration: unchanged      Otherwise 10 point ROS was neg other than the symptoms noted above.    PAST  HISTORY was reviewed and updated:      MEDICATIONS and ALLERGIES were reviewed and updated.    SOCIAL HISTORY was reviewed and updated:        EXAM:    PHYSICAL EXAMINATION:   VITAL SIGNS:  B/P: 128/85, T: Data Unavailable, P: 82, R: Data Unavailable    GENERAL: The patient is a well-nourished  who presents to the evaluation alone.  NEUROLOGIC:   MENTAL STATUS: Alert,awake and  oriented times four.   CRANIAL NERVES: . Visual fields are full to confrontation. The pupils are  round and react to light and there is no Blayne Diamond pupil.  Extraocular movements are  intact with no  internuclear ophthalmoplegia. No nystagmus. Facial strength and sensation are  normal. Hearing is  normal. Palate elevation and tongue protrusion are  normal.   POWER:      Motor     Upper         Right Left   Shoulder Abduction 5 5   Elbow Flexion 5 5   Elbow Extension 5 5   Wrist Extension 5 5   Digit Extension 5 5   Digit Flexion 5 5   APB 5 5   Tone 0 0   Lower          Right Left   Hip Flexion 5 5   Knee Extension 5 5   Knee Flexion 5 5   Foot Dorsiflexion 5 5   Foot Plantar Flexion 5 5   EH 5 5   Toe Flexion 5 5   Tone 0 0             Grade Description   0 No increase in muscle tone   1 Slight increase in muscle tone, manifested by a catch and release or by minimal resistance at the end of the range of motion when the affected part(s) is moved in flexion or extension   1+ Slight increase in muscle tone, manifested by a catch, followed by minimal resistance throughout the remainder (less than half) of the ROM   2 More marked increase in muscle tone through most of the ROM, but affected part(s) easily moved   3 Considerable increase in muscle tone, passive movement difficult   4 Affected part(s) rigid in flexion or extension               SENSORY:      Light touch:  intact except for the right digit 1-3 and the right palm of the right hand.            MOTOR/CEREBELLAR:    Right Left   RRM 1 Abnormal 0 Normal   BOBBY 1 Abnormal 0 Normal   FTN  0 Normal 0 Normal   RRM 0 Normal 0 Normal   HKS 0 Normal 0 Normal           GAIT: Gait is   narrow-based and steady and the patient is  able to walk on heels, toes and in tandem without difficulty.    Romberg: Stable with eye(s) closed    RESULTS:  Monitoring labs:    No visits with results within 6 Month(s) from this visit.   Latest known visit with results is:   Orders Only on 02/06/2018   Component Date Value Ref Range Status     Sodium 02/06/2018 140  133 - 144 mmol/L Final     Potassium 02/06/2018 3.9  3.4 - 5.3 mmol/L Final     Chloride 02/06/2018 105  94 - 109 mmol/L Final     Carbon Dioxide 02/06/2018 28  20 - 32 mmol/L Final     Anion Gap 02/06/2018 6  3 - 14 mmol/L Final     Glucose 02/06/2018 94  70 - 99 mg/dL Final     Urea Nitrogen 02/06/2018 15  7 - 30 mg/dL Final     Creatinine 02/06/2018 0.79  0.66 - 1.25 mg/dL Final     GFR Estimate 02/06/2018 >90  >60 mL/min/1.7m2 Final     GFR Estimate If Black 02/06/2018 >90  >60 mL/min/1.7m2 Final     Calcium 02/06/2018 9.0  8.5 - 10.1 mg/dL Final     Bilirubin Total 02/06/2018 0.2  0.2 - 1.3 mg/dL Final     Albumin 02/06/2018 4.2  3.4 - 5.0 g/dL Final     Protein Total 02/06/2018 8.0  6.8 - 8.8 g/dL Final     Alkaline Phosphatase 02/06/2018 92  40 - 150 U/L Final     ALT 02/06/2018 42  0 - 70 U/L Final     AST 02/06/2018 21  0 - 45 U/L Final   ]      MRI brain:    no new MRI to review    ASSESSMENT/PLAN:  The patient is a 33-year-old gentleman with a past medical history of relapsing remitting multiple sclerosis overall, the patient has had some mild worsening of his symptoms over the past 6 months.  His symptoms of ear  itching and tingling could be secondary to either a old lesion in his shai or potentially a new one.  I will get an MRI of the brain to determine whether or not he has had progression of his disease.  In regards to his constipation. I feel that this is more likely than not secondary to his multiple lesions in the spinal cord.  I explained to  him that there are many different ways in order to treat constipation in MS.  The patient did not really have much success with MiraLAX due to increased stomach pain.  The patient does carry a history of diverticulitis and wanted to try a short course of steroids.  I told him that that probably would not work, but this is a relatively low risk.  I advised him of the risk and benefits of this. I discussed the use of probiotics to potentially help with the patient's symptoms.  I  also spent time educating the patient about potential treatments for his bladder symptoms.  I will get a CT of his abdomen and pelvis to determine whether or not he has  diverticulitis  or not.  Depending on the results of this testing, I may refer him to gastroenterology.  If all this workup is unrevealing and he does not have any relapse, I will continue him on Rebif.  However, if he does have new lesions, then serious consideration should be given to switching disease modifying therapy.  I will check screening blood work to assure that he is not having any side effects from Rebif.  I will tentatively follow the patient up in 2 months.  Instructed him to call my office with any questions problems issues or concerns.      MRI of the brain  CT abdomen and pelvis  Start Medrol Dosepak  Check CBC, CMP, TSH  Follow-up in 6 months    Patient Health Education Discussed at Visit: I discussed healthy diet with the patient and I also reviewed the potential use of probiotics with the patient.      I spent 25 minutes in this visit, with >50% direct patient time spent counseling about prognosis, treatment options, and coordination of care.    Michael Spear MD [facility]  Staff Neurologist   02/05/19       (Chart documentation was completed in part with Dragon voice-recognition software. Even though reviewed, some grammatical, spelling, and word errors may remain.)

## 2019-02-08 LAB — VIT B6 SERPL-MCNC: 25.6 NMOL/L (ref 20–125)

## 2019-03-07 DIAGNOSIS — G35 MULTIPLE SCLEROSIS (H): ICD-10-CM

## 2019-03-07 NOTE — TELEPHONE ENCOUNTER
Received refill request for Rebif from [facility]; Patient was last seen in February and has follow up appointment in August with Dr. Spear; Refilled for 1 year per MS refill protocol.    Airam Cazares MS RN Care Coordinator

## 2019-05-17 ENCOUNTER — NURSE TRIAGE (OUTPATIENT)
Dept: INTERNAL MEDICINE | Facility: CLINIC | Age: 34
End: 2019-05-17

## 2019-05-17 ENCOUNTER — TELEPHONE (OUTPATIENT)
Dept: NEUROLOGY | Facility: CLINIC | Age: 34
End: 2019-05-17

## 2019-05-17 DIAGNOSIS — G35 MULTIPLE SCLEROSIS (H): Primary | ICD-10-CM

## 2019-05-17 DIAGNOSIS — G35 MULTIPLE SCLEROSIS (H): ICD-10-CM

## 2019-05-17 DIAGNOSIS — K59.01 SLOW TRANSIT CONSTIPATION: ICD-10-CM

## 2019-05-17 RX ORDER — PREDNISONE 20 MG/1
TABLET ORAL
Qty: 24 TABLET | Refills: 0 | Status: SHIPPED | OUTPATIENT
Start: 2019-05-17 | End: 2019-08-06

## 2019-05-17 RX ORDER — FAMOTIDINE 40 MG/1
40 TABLET, FILM COATED ORAL DAILY
Qty: 30 TABLET | Refills: 0 | Status: SHIPPED | OUTPATIENT
Start: 2019-05-17

## 2019-05-17 RX ORDER — PREDNISONE 50 MG/1
1250 TABLET ORAL DAILY
Qty: 75 TABLET | Refills: 0 | Status: SHIPPED | OUTPATIENT
Start: 2019-05-17 | End: 2019-08-06

## 2019-05-17 NOTE — TELEPHONE ENCOUNTER
Confirmed with patient that he is requesting Adderall. Rx for steroids was sent in symptom call encounter.     Patient called in requesting refill of their Adderall; Patient was last seen in February and does not have a follow up scheduled. He will call to schedule once he knows when his MRI appointment is. Pended rx to Dr. Spear for signature and will mail to the pharmacy once signed.    Roberta VELASQUEZ     Patient is aware this will be next week.

## 2019-05-17 NOTE — TELEPHONE ENCOUNTER
[facility] Health: Nurse Triage Note  SITUATION/BACKGROUND                                                      Darin Maier is a 34 year old male who calls with concern regarding symptoms in RIGHT eye area.  Patient treated for MS by Dr Spear. LVD 2/5/2019 relapsing remitting multiple sclerosis, LVD 2/5/2019   No floaters, curtains, visual change or blurriness.The tenderness occurs primarily with extreme eye movement to periphery.  Concern for potential optic neuritis.  High priority message to staff  Previous right vision affected while hospitalized in 2008 which resolved on high dose steroids.  He has tenderness in LEFT eye socket for 3-4 days.  No floaters, curtains, visual change.The tenderness occurs hurts with extreme eye movement to periphery.  He still has clogging in ears as before, this has not resolved but has improved.  No other symptoms noted, he is working and doing his  work , notes he is aware of  increased stress   [id number] - [facility] [address]  methylPREDNISolone (MEDROL DOSEPAK) 4 MG tablet therapy pack 21 tablet 0 2/5/2019  --   Sig: Follow Package Directions     He is hoping he can try Methyprednisolone dosepak rather than come in for evaluation as it is his daughter's 4th birthday.  Reviewed that if he experiences rapid increased in tenderness> eye pain, field cut, cloudiness, curtains, floaters or other visual changes he will need to come via ED for evaluation of vision and potential MS exacerbation.       MEDICATIONS: Adderall, interferon, gabapentin, methypred previous     Allergies   Allergen Reactions     No Known Drug Allergy        ASSESSMENT    Dr Spear patient with relapsing remitting multiple sclerosis, LVD 2/5/2019.  Requesting Medrol dosepak.He has tenderness in LEFT eye socket for 3-4 days.  No floaters, curtains, visual change or blurriness.The tenderness occurs primarily with extreme eye movement to  periphery.  Concern for potential optic neuritis.  High priority message to staff       RECOMMENDATION/PLAN                                                      RECOMMENDED DISPOSITION:  Phone Visit, Ed if rapid escalation in pain or change in vision  Will comply with recommendation: Yes    If further questions/concerns or if symptoms do not improve, worsen or new symptoms develop, call your PCP or [phone] to talk with the Resident on call, as soon as possible.    Guideline used: pp 635  vision changes, MS  Telephone Triage Protocols for Nurses, Fifth Edition, Rayne Wilson RN

## 2019-05-17 NOTE — TELEPHONE ENCOUNTER
Called patient. He does not have any redness or s/s of infection in the eye. He will proceed with steroids. I called [facility] pharmacy and confirmed dose.     Patient would like CT and MRI orders faxed to [facility] (phone: [phone]; fax: [phone]). This has been done. Once patient has these appts scheduled, he will call us back to schedule f/u with Dr. Spear.

## 2019-05-17 NOTE — TELEPHONE ENCOUNTER
"Called and spoke with Darin to discuss the message below from triage.     He tells me that 3- 4 days ago he developed pain in the left eye. This occurs with movement up, down, or to the side. He describes pain as tender and feels like \"a migraine in my eye\". He denies blurry/double vision, no vision loss, no change in color perception. No flashing lights. No right eye symptoms. He says his left ear is still plugged as well.  He denies recent illness but expresses that he is experiencing an increase of stress at work.     He is wondering if steroids are in order.     Per Dr. Spear's last office note 2/5/19: he wanted the patient to have a CT abdomen and brain MRI. Neither of these tests were ever done.     Dr. Spear, what do you recommend?     "

## 2019-05-17 NOTE — TELEPHONE ENCOUNTER
Health Call Center    Phone Message    May a detailed message be left on voicemail: yes    Reason for Call: Medication Refill Request    Has the patient contacted the pharmacy for the refill? Yes   Name of medication being requested: amphetamine-dextroamphetamine (ADDERALL) 20 MG tablet  Provider who prescribed the medication:   Pharmacy: [id number] [address]  Date medication is needed:  As soon as possible pt is having symptoms or vision lost again.         Action Taken: Message routed to:  [facility] neurology

## 2019-05-17 NOTE — TELEPHONE ENCOUNTER
Hin    It seems like optic neuritis. If is eye injected.If not I will treat him with steroid, which I ordered. If he has red eye or anything also abnormal, then he needs to go to the ed and not take the steroids.. If the pain worsens after starting the steroids,then he needs to go to the ED. He also needs to get his imaging. If he does not, then I can not continue to care for him. I need him  to be seen within the next two weeks if improve or he needs to see ophthalmology or deion on Monday  If the steroids do nothing.    Sincerely  Michael Spear MD [facility]  Staff Neurologist   05/17/19

## 2019-05-20 RX ORDER — DEXTROAMPHETAMINE SACCHARATE, AMPHETAMINE ASPARTATE, DEXTROAMPHETAMINE SULFATE AND AMPHETAMINE SULFATE 5; 5; 5; 5 MG/1; MG/1; MG/1; MG/1
20 TABLET ORAL DAILY
Qty: 30 TABLET | Refills: 0 | Status: SHIPPED | OUTPATIENT
Start: 2019-05-20 | End: 2019-07-02

## 2019-05-20 NOTE — TELEPHONE ENCOUNTER
Adderal Rx signed by Dr. Spear and placed in the mail to pt's pharmacy ([id number]). Anel Bridges RN

## 2019-05-29 ENCOUNTER — TELEPHONE (OUTPATIENT)
Dept: NEUROLOGY | Facility: CLINIC | Age: 34
End: 2019-05-29

## 2019-05-29 ENCOUNTER — TRANSFERRED RECORDS (OUTPATIENT)
Dept: HEALTH INFORMATION MANAGEMENT | Facility: CLINIC | Age: 34
End: 2019-05-29

## 2019-05-30 ENCOUNTER — TELEPHONE (OUTPATIENT)
Dept: NEUROLOGY | Facility: CLINIC | Age: 34
End: 2019-05-30

## 2019-05-30 DIAGNOSIS — H46.9 OPTIC NEURITIS, LEFT: Primary | ICD-10-CM

## 2019-05-30 NOTE — TELEPHONE ENCOUNTER
Hi    So I went back and looked at the images again because I looked at it between patient's. They did not do the right MRI. I want a MRI of the orbits as well. I went back and looked at the left optic nerve again and am fairly convinced that there this probably an optic neuritis there. I can not say with absolute certainity due to fact that its only 3 slices out of a couple hundred but,  I am 85% sure it is optic neuritis.  The nerve looks a little swollen and brighter compared to the other, but it is not 100%. I would either offer to treat him with steroids or get the Orbital MRI.    Sincerely  Michael Spear MD [facility]  Staff Neurologist   05/30/19

## 2019-05-30 NOTE — TELEPHONE ENCOUNTER
Do you recommend anything with regards to his continued symptoms? Should he see ophthalmology? Thank you.    iAram Cazares MS RN Care Coordinator

## 2019-05-30 NOTE — TELEPHONE ENCOUNTER
MRI reports received back from [facility], however, are not available in the patient's chart yet; I called [facility] to obtain the images, which have been pushed to PACS; I called the film room to have the images merged; I will send the MRI results to Dr. Spear via secure email to review; Dr. Spear, please let me know what you would like to do; Thank you.    Airam Cazares, MS RN Care Coordinator

## 2019-05-30 NOTE — TELEPHONE ENCOUNTER
The MRI is stable    Sincerely  Michael Spear MD [facility]  Staff Neurologist   05/30/19     e

## 2019-05-30 NOTE — TELEPHONE ENCOUNTER
I called Darin back with Dr. Spear's input; After talking with him for a while, he would like to do the MRI orbits at [facility] to know 100% if he has optic neuritis or not; His vision symptoms are not worse, and maybe slightly improved; I told Darin that Dr. Spear will need to place a new order for an MRI specific for the orbits, which can then be faxed to [facility] (phone [phone] fax [phone]); He finished his steroid taper today, and tolerated that just fine; I will ask Albertina to fax the MRI orders once placed and call the patient to let him know when that has been done.    Airam Cazares, MS RN Care Coordinator

## 2019-05-30 NOTE — TELEPHONE ENCOUNTER
I placed the order. I mean I guess we could do an exam as well to determine if he has evidence on exam as well.    Sincerely  Michael Spear MD [facility]  Staff Neurologist   05/30/19

## 2019-05-30 NOTE — TELEPHONE ENCOUNTER
[facility] Call Center    Phone Message    May a detailed message be left on voicemail: yes    Reason for Call: Other: Pt called stating that he is still having some vision issues after finishing the steroid treatments. Pt states that he also had the MRI done and is wondering if Dr. Spear has had a chance to view those images. Pt states that he is wondering what next steps will be. Please give pt a call back to advise.     Action Taken: Message routed to:  [facility] Neurology

## 2019-05-31 NOTE — TELEPHONE ENCOUNTER
MRI orders faxed to [facility] as requested- Patient informed via voicemail  Albertina Funez MA

## 2019-06-06 ENCOUNTER — TELEPHONE (OUTPATIENT)
Dept: NEUROLOGY | Facility: CLINIC | Age: 34
End: 2019-06-06

## 2019-06-06 NOTE — TELEPHONE ENCOUNTER
I called [facility] and gave verbal for the MRI orbits with and without contrast.    Airam Cazares, MS RN Care Coordinator

## 2019-06-06 NOTE — TELEPHONE ENCOUNTER
Health Call Center    Phone Message    May a detailed message be left on voicemail: yes    Reason for Call: Other: Melissa from [facility] said they have not recieved the faxed orders for pt's MRI of the orbits with and without contrast. They are requesting verbal orders instead. Please call them at [phone].     Action Taken: Message routed to:  [facility] Neurology

## 2019-06-06 NOTE — TELEPHONE ENCOUNTER
[facility] Call Center    Phone Message    May a detailed message be left on voicemail: yes    Reason for Call: Other: Brandon calling to state that [facility] did not receive MRI order and pt has appt scheduled toda 06/06 at 8:50am. Please contact them as soon as possible to see whats going on.      Action Taken: Message routed to:  [facility] jono neuro      46.3

## 2019-06-06 NOTE — TELEPHONE ENCOUNTER
[facility] Call Center    Phone Message    May a detailed message be left on voicemail: no - if you are unable to reach Ileana, you can give verbal order for MRI Orbit to anyone that may answer.     Reason for Call: Order(s): Other:   Reason for requested: Need Verbal Orders ASAP - PT THERE NOW FOR MRI - THEY NEVER RECEIVED ORDERS VIA FAX - NEED SOMEONE TO CALL WITH VERBAL FOR MRI ORBIT. PLEASE CALL [phone].   Date needed: ASAP - Pt at Clinic now waiting for orders to be received to have MRI done  Provider name: Dr. Spear      Action Taken: Message routed to:  [facility] Neurology Clinic

## 2019-06-07 ENCOUNTER — TRANSFERRED RECORDS (OUTPATIENT)
Dept: HEALTH INFORMATION MANAGEMENT | Facility: CLINIC | Age: 34
End: 2019-06-07

## 2019-06-11 RX ORDER — PREDNISONE 50 MG/1
1250 TABLET ORAL DAILY
Qty: 125 TABLET | Refills: 0 | Status: SHIPPED | OUTPATIENT
Start: 2019-06-11 | End: 2019-08-06

## 2019-06-11 NOTE — TELEPHONE ENCOUNTER
Hi    He has optic neuritis. We consider steroids and he should change medications      Thanks  Michael Spear MD [facility]  Staff Neurologist   06/11/19

## 2019-06-11 NOTE — TELEPHONE ENCOUNTER
Darin's MRI report received and placed in Dr. Spear's folder for review; Patient's images are in PACS; Dr. Spear, please review and advise on the plan for the patient; Thank you.    Airam Cazares, MS RN Care Coordinator

## 2019-06-11 NOTE — TELEPHONE ENCOUNTER
I called the patient regarding his MRI result; He would like to do another round of steroids; Several lab orders, as well as an EKG order, placed per Dr. Spear; I explained to the patient that doing these tests will help guide the best alternative disease modifying therapy options for him, as the Rebif is no longer controlling his MS; He will try to have these completed at [facility] this week sometime; I talked with Dr. Spear, and I sent a new prescription for prednisone 50mg tablets with directions to take 25 tablets (1250mg) by mouth daily for 5 consecutive days; This has been sent electronically to the patient's pharmacy per Dr. Spear.    Airam Cazares, MS RN Care Coordinator

## 2019-06-24 NOTE — TELEPHONE ENCOUNTER
Darin has not completed his lab work or EKG yet; I left [facility] for Darin inquiring as to when he thinks he will be able to complete this, as well as to get an update on how he is doing; I asked that he call the clinic back and let the call center know when I can reach him back either later today or tomorrow.    Airam Cazares, MS RN Care Coordinator

## 2019-07-01 DIAGNOSIS — K59.01 SLOW TRANSIT CONSTIPATION: ICD-10-CM

## 2019-07-01 DIAGNOSIS — G35 MULTIPLE SCLEROSIS (H): ICD-10-CM

## 2019-07-01 NOTE — TELEPHONE ENCOUNTER
Rx Authorization:    Date last refill ordered: 5/20/19    Quantity ordered: 30    # refills: 0    Date of last clinic visit: 2/15/19    Date of next clinic visit: 8/6/19    Include pertinent information from patients message: Please mail script to pharmacy per patients request

## 2019-07-01 NOTE — TELEPHONE ENCOUNTER
Health Call Center    Phone Message    May a detailed message be left on voicemail: yes    Reason for Call: Medication Refill Request    Has the patient contacted the pharmacy for the refill? Yes   Name of medication being requested: Adderall  Provider who prescribed the medication: Dr. Michael Spear  Pharmacy: [facility] pharmacy in [place]  Date medication is needed: ASAP- only 3 pills left-  Darin states that last Rx may have been called into the pharmacy.  Is that the case?- if not please mail to pharmacy         Action Taken: Message routed to:  [facility] neurology

## 2019-07-01 NOTE — TELEPHONE ENCOUNTER
Patient called in requesting refill of their Adderall; Patient was last seen in February and has follow up appointment in August with Dr. Spear. Pended rx to Dr. Spear for signature and will mail to the pharmacy once signed.    Roberta VELASQUEZ

## 2019-07-02 RX ORDER — DEXTROAMPHETAMINE SACCHARATE, AMPHETAMINE ASPARTATE, DEXTROAMPHETAMINE SULFATE AND AMPHETAMINE SULFATE 5; 5; 5; 5 MG/1; MG/1; MG/1; MG/1
20 TABLET ORAL DAILY
Qty: 30 TABLET | Refills: 0 | Status: SHIPPED | OUTPATIENT
Start: 2019-07-02 | End: 2019-07-24

## 2019-07-08 NOTE — TELEPHONE ENCOUNTER
Darin has still not completed his lab work or EKG; I called Darin again to see how he is doing, however, he did not answer; I left [facility] for him advising that I wouldn't try calling him anymore, and that he does have his appointment with Dr. Spear in about 4 weeks.    Airam Cazares, MS RN Care Coordinator

## 2019-07-24 ENCOUNTER — MYC REFILL (OUTPATIENT)
Dept: NEUROLOGY | Facility: CLINIC | Age: 34
End: 2019-07-24

## 2019-07-24 DIAGNOSIS — K59.01 SLOW TRANSIT CONSTIPATION: ICD-10-CM

## 2019-07-24 DIAGNOSIS — G35 MULTIPLE SCLEROSIS (H): ICD-10-CM

## 2019-07-24 RX ORDER — DEXTROAMPHETAMINE SACCHARATE, AMPHETAMINE ASPARTATE, DEXTROAMPHETAMINE SULFATE AND AMPHETAMINE SULFATE 5; 5; 5; 5 MG/1; MG/1; MG/1; MG/1
20 TABLET ORAL DAILY
Qty: 30 TABLET | Refills: 0 | Status: CANCELLED | OUTPATIENT
Start: 2019-07-24

## 2019-07-25 NOTE — TELEPHONE ENCOUNTER
Patient sent [employer] message requesting refill of their Adderall; Patient was last seen in February and has follow up appointment in August with Dr. Spear; Pended rx to Dr. Spear for signature and will mail to the pharmacy once signed.    Airam Cazares MS RN Care Coordinator

## 2019-07-29 RX ORDER — DEXTROAMPHETAMINE SACCHARATE, AMPHETAMINE ASPARTATE, DEXTROAMPHETAMINE SULFATE AND AMPHETAMINE SULFATE 5; 5; 5; 5 MG/1; MG/1; MG/1; MG/1
20 TABLET ORAL DAILY
Qty: 30 TABLET | Refills: 0 | Status: SHIPPED | OUTPATIENT
Start: 2019-07-29 | End: 2019-09-07

## 2019-08-06 ENCOUNTER — MEDICAL CORRESPONDENCE (OUTPATIENT)
Dept: HEALTH INFORMATION MANAGEMENT | Facility: CLINIC | Age: 34
End: 2019-08-06

## 2019-08-06 ENCOUNTER — OFFICE VISIT (OUTPATIENT)
Dept: NEUROLOGY | Facility: CLINIC | Age: 34
End: 2019-08-06
Attending: PSYCHIATRY & NEUROLOGY
Payer: COMMERCIAL

## 2019-08-06 ENCOUNTER — TELEPHONE (OUTPATIENT)
Dept: NEUROLOGY | Facility: CLINIC | Age: 34
End: 2019-08-06

## 2019-08-06 VITALS
HEART RATE: 102 BPM | DIASTOLIC BLOOD PRESSURE: 87 MMHG | SYSTOLIC BLOOD PRESSURE: 133 MMHG | BODY MASS INDEX: 28.1 KG/M2 | HEIGHT: 73 IN | WEIGHT: 212 LBS

## 2019-08-06 DIAGNOSIS — G35 MULTIPLE SCLEROSIS (H): Primary | ICD-10-CM

## 2019-08-06 DIAGNOSIS — H46.9 OPTIC NEURITIS, LEFT: ICD-10-CM

## 2019-08-06 LAB
ALBUMIN SERPL-MCNC: 4 G/DL (ref 3.4–5)
ALP SERPL-CCNC: 89 U/L (ref 40–150)
ALT SERPL W P-5'-P-CCNC: 39 U/L (ref 0–70)
ANION GAP SERPL CALCULATED.3IONS-SCNC: 4 MMOL/L (ref 3–14)
AST SERPL W P-5'-P-CCNC: 18 U/L (ref 0–45)
BASOPHILS # BLD AUTO: 0 10E9/L (ref 0–0.2)
BASOPHILS NFR BLD AUTO: 0.5 %
BILIRUB SERPL-MCNC: 0.2 MG/DL (ref 0.2–1.3)
BUN SERPL-MCNC: 14 MG/DL (ref 7–30)
CALCIUM SERPL-MCNC: 8.7 MG/DL (ref 8.5–10.1)
CHLORIDE SERPL-SCNC: 109 MMOL/L (ref 94–109)
CO2 SERPL-SCNC: 28 MMOL/L (ref 20–32)
CREAT SERPL-MCNC: 0.79 MG/DL (ref 0.66–1.25)
DIFFERENTIAL METHOD BLD: ABNORMAL
EOSINOPHIL # BLD AUTO: 0.1 10E9/L (ref 0–0.7)
EOSINOPHIL NFR BLD AUTO: 1.6 %
ERYTHROCYTE [DISTWIDTH] IN BLOOD BY AUTOMATED COUNT: 12.6 % (ref 10–15)
GFR SERPL CREATININE-BSD FRML MDRD: >90 ML/MIN/{1.73_M2}
GLUCOSE SERPL-MCNC: 110 MG/DL (ref 70–99)
HBV CORE AB SERPL QL IA: NONREACTIVE
HBV SURFACE AB SERPL IA-ACNC: 0.14 M[IU]/ML
HBV SURFACE AG SERPL QL IA: NONREACTIVE
HCT VFR BLD AUTO: 45.1 % (ref 40–53)
HGB BLD-MCNC: 15.1 G/DL (ref 13.3–17.7)
HIV 1+2 AB+HIV1 P24 AG SERPL QL IA: NONREACTIVE
IMM GRANULOCYTES # BLD: 0 10E9/L (ref 0–0.4)
IMM GRANULOCYTES NFR BLD: 0.5 %
LYMPHOCYTES # BLD AUTO: 1.2 10E9/L (ref 0.8–5.3)
LYMPHOCYTES NFR BLD AUTO: 31 %
MCH RBC QN AUTO: 30.3 PG (ref 26.5–33)
MCHC RBC AUTO-ENTMCNC: 33.5 G/DL (ref 31.5–36.5)
MCV RBC AUTO: 91 FL (ref 78–100)
MONOCYTES # BLD AUTO: 0.4 10E9/L (ref 0–1.3)
MONOCYTES NFR BLD AUTO: 10.2 %
NEUTROPHILS # BLD AUTO: 2.1 10E9/L (ref 1.6–8.3)
NEUTROPHILS NFR BLD AUTO: 56.2 %
NRBC # BLD AUTO: 0 10*3/UL
NRBC BLD AUTO-RTO: 0 /100
PLATELET # BLD AUTO: 236 10E9/L (ref 150–450)
POTASSIUM SERPL-SCNC: 3.9 MMOL/L (ref 3.4–5.3)
PROT SERPL-MCNC: 7.4 G/DL (ref 6.8–8.8)
RBC # BLD AUTO: 4.98 10E12/L (ref 4.4–5.9)
SODIUM SERPL-SCNC: 140 MMOL/L (ref 133–144)
VZV IGG SER QL IA: 5.7 AI (ref 0–0.8)
WBC # BLD AUTO: 3.7 10E9/L (ref 4–11)

## 2019-08-06 PROCEDURE — 85025 COMPLETE CBC W/AUTO DIFF WBC: CPT | Performed by: PSYCHIATRY & NEUROLOGY

## 2019-08-06 PROCEDURE — 93010 ELECTROCARDIOGRAM REPORT: CPT | Mod: ZP | Performed by: INTERNAL MEDICINE

## 2019-08-06 PROCEDURE — 87389 HIV-1 AG W/HIV-1&-2 AB AG IA: CPT | Performed by: PSYCHIATRY & NEUROLOGY

## 2019-08-06 PROCEDURE — G0463 HOSPITAL OUTPT CLINIC VISIT: HCPCS

## 2019-08-06 PROCEDURE — 40000975 ZZHCL STATISTIC JC VIR AB INDEX INHIB: Performed by: PSYCHIATRY & NEUROLOGY

## 2019-08-06 PROCEDURE — 87522 HEPATITIS C REVRS TRNSCRPJ: CPT | Performed by: PSYCHIATRY & NEUROLOGY

## 2019-08-06 PROCEDURE — 86787 VARICELLA-ZOSTER ANTIBODY: CPT | Performed by: PSYCHIATRY & NEUROLOGY

## 2019-08-06 PROCEDURE — 86704 HEP B CORE ANTIBODY TOTAL: CPT | Performed by: PSYCHIATRY & NEUROLOGY

## 2019-08-06 PROCEDURE — 80053 COMPREHEN METABOLIC PANEL: CPT | Performed by: PSYCHIATRY & NEUROLOGY

## 2019-08-06 PROCEDURE — 86706 HEP B SURFACE ANTIBODY: CPT | Performed by: PSYCHIATRY & NEUROLOGY

## 2019-08-06 PROCEDURE — 87340 HEPATITIS B SURFACE AG IA: CPT | Performed by: PSYCHIATRY & NEUROLOGY

## 2019-08-06 PROCEDURE — 36415 COLL VENOUS BLD VENIPUNCTURE: CPT | Performed by: PSYCHIATRY & NEUROLOGY

## 2019-08-06 PROCEDURE — 86481 TB AG RESPONSE T-CELL SUSP: CPT | Performed by: PSYCHIATRY & NEUROLOGY

## 2019-08-06 RX ORDER — AMOXICILLIN 250 MG
1 CAPSULE ORAL DAILY
Qty: 30 TABLET | Refills: 11 | Status: SHIPPED | OUTPATIENT
Start: 2019-08-06

## 2019-08-06 RX ORDER — HEPARIN SODIUM,PORCINE 10 UNIT/ML
5 VIAL (ML) INTRAVENOUS
Status: CANCELLED | OUTPATIENT
Start: 2019-08-06

## 2019-08-06 RX ORDER — DEXTROAMPHETAMINE SACCHARATE, AMPHETAMINE ASPARTATE, DEXTROAMPHETAMINE SULFATE AND AMPHETAMINE SULFATE 2.5; 2.5; 2.5; 2.5 MG/1; MG/1; MG/1; MG/1
10 TABLET ORAL DAILY
Qty: 30 TABLET | Refills: 0 | Status: SHIPPED | OUTPATIENT
Start: 2019-08-06 | End: 2019-11-11

## 2019-08-06 RX ORDER — ACETAMINOPHEN 325 MG/1
650 TABLET ORAL ONCE
Status: CANCELLED | OUTPATIENT
Start: 2019-08-06

## 2019-08-06 RX ORDER — DEXTROAMPHETAMINE SACCHARATE, AMPHETAMINE ASPARTATE, DEXTROAMPHETAMINE SULFATE AND AMPHETAMINE SULFATE 2.5; 2.5; 2.5; 2.5 MG/1; MG/1; MG/1; MG/1
10 TABLET ORAL DAILY
Qty: 30 TABLET | Refills: 0 | Status: SHIPPED | OUTPATIENT
Start: 2019-10-07 | End: 2019-11-26

## 2019-08-06 RX ORDER — DEXTROAMPHETAMINE SACCHARATE, AMPHETAMINE ASPARTATE, DEXTROAMPHETAMINE SULFATE AND AMPHETAMINE SULFATE 2.5; 2.5; 2.5; 2.5 MG/1; MG/1; MG/1; MG/1
10 TABLET ORAL DAILY
Qty: 30 TABLET | Refills: 0 | Status: SHIPPED | OUTPATIENT
Start: 2019-09-06 | End: 2019-10-06

## 2019-08-06 RX ORDER — METHYLPREDNISOLONE SODIUM SUCCINATE 125 MG/2ML
125 INJECTION, POWDER, LYOPHILIZED, FOR SOLUTION INTRAMUSCULAR; INTRAVENOUS ONCE
Status: CANCELLED | OUTPATIENT
Start: 2019-08-06

## 2019-08-06 RX ORDER — HEPARIN SODIUM (PORCINE) LOCK FLUSH IV SOLN 100 UNIT/ML 100 UNIT/ML
5 SOLUTION INTRAVENOUS
Status: CANCELLED | OUTPATIENT
Start: 2019-08-06

## 2019-08-06 RX ORDER — DIPHENHYDRAMINE HCL 25 MG
50 CAPSULE ORAL ONCE
Status: CANCELLED | OUTPATIENT
Start: 2019-08-06

## 2019-08-06 ASSESSMENT — PAIN SCALES - GENERAL: PAINLEVEL: NO PAIN (0)

## 2019-08-06 ASSESSMENT — MIFFLIN-ST. JEOR: SCORE: 1950.51

## 2019-08-06 NOTE — LETTER
8/6/2019       RE: Darin Maier  [address] [facility] [address]     Dear Colleague,    Thank you for referring your patient, Darin Maier, to the [facility] MULTIPLE SCLEROSIS at [facility]. Please see a copy of my visit note below.    MULTIPLE SCLEROSIS CLINIC AT THE [facility]  FOLLOWUP/ESTABLISHED PATIENT VISIT    PRINCIPAL NEUROLOGIC DIAGNOSIS: Multiple Sclerosis     Date of Onset: 2004  Date of Diagnosis: 2006  Initial Clinical Course: Relapsing Remitting  Current Clinical Course: Relapsing Remitting  Past Disease Modifying Therapy(ies): Copaxone  Current Disease Modifying Therapy(ies):Rebif  Most Recent MRI of the Brain: 6/7/19  Most Recent MRI of the Cervical Cord 10/2017  Most Recent MRI of the Thoracic Cord: NA  Most Recent Lumbar Puncture: 2008  Most Recent OCT: NA   Most Recent JCV:  NA  Most Recent Remote Hepatitis Panel:  NA  Most Recent VZV IgG:  NA  Most Recent TB Quant:  NA     CHIEF COMPLAINT: Follow up on DMT     INTERVAL HISTORY:    The patient reports that he thinks his eyes are still not back to normal. He reports that if he goes fast at thing or focusing on things his vision will be a little hazy. He reports that he has issues with focusing. He reports that there is a slightly dizzy like thinks. He reports that the closing his eyes or looks away. He reports that when he first had it there where were floating dots. He reports that it is mother bothersome. He reports that he has an itching. The patient reports a strong foriengn body sensation. He reports that it is more is haze. He reports that sometimes it goes away. He does mainly notices when he is at work.     Issues with current MS therapy: Tolerating DMT without issue    REVIEW OF SYSTEMS:    Mood: unchanged  Spasticity:none  Bladder: none  Bowel: unchanged  Pain related to today's visit:reviewed on nursing intake documentation  Fatigue: unchanged  Sleep: well/ no  "problem  Memory/Concentration: unchanged    Otherwise 10 point ROS was neg other than the symptoms noted above.    PAST HISTORY was reviewed and updated:    MEDICATIONS and ALLERGIES were reviewed and updated.    SOCIAL HISTORY was reviewed and updated:    EXAM:    PHYSICAL EXAMINATION:   VITAL SIGNS: Vital signs:      BP: 133/87 Pulse: 102           Height: 185.4 cm (6' 1\") Weight: 96.2 kg (212 lb)  Estimated body mass index is 27.97 kg/m  as calculated from the following:    Height as of this encounter: 1.854 m (6' 1\").    Weight as of this encounter: 96.2 kg (212 lb).    GENERAL: The patient is a well-nourished  who presents to the evaluation alone.  NEUROLOGIC:   MENTAL STATUS: Alert,awake and  oriented times four.   CRANIAL NERVES: Visual acuity is 20/20 in right eyes and 20/30 in the left with a near card (without). Visual fields are full to confrontation. The pupils are  round and react to light and there is no Blayne Diamond pupil.  Extraocular movements are  intact with no  internuclear ophthalmoplegia. No nystagmus. Facial strength and sensation are  normal. Hearing is  normal. Palate elevation and tongue protrusion are  normal.   POWER:      Motor     Upper         Right Left   Shoulder Abduction 5 5   Elbow Flexion 5 5   Elbow Extension 5 5   Wrist Extension 5 5   Digit Extension 5 5   Digit Flexion 5 5   APB 5 5   Tone 0 0   Lower          Right Left   Hip Flexion 5 5   Knee Extension 5 5   Knee Flexion 5 5   Foot Dorsiflexion 5 5   Foot Plantar Flexion 5 5   EH 5 5   Toe Flexion 5 5   Tone 0 0             Grade Description   0 No increase in muscle tone   1 Slight increase in muscle tone, manifested by a catch and release or by minimal resistance at the end of the range of motion when the affected part(s) is moved in flexion or extension   1+ Slight increase in muscle tone, manifested by a catch, followed by minimal resistance throughout the remainder (less than half) of the ROM   2 More marked increase " in muscle tone through most of the ROM, but affected part(s) easily moved   3 Considerable increase in muscle tone, passive movement difficult   4 Affected part(s) rigid in flexion or extension         SENSORY:      Light touch:  intact except for the right digit 1-3 and the right palm of the right hand.        MOTOR/CEREBELLAR:     Right Left   RRM 1 Abnormal 0 Normal   BOBBY 1 Abnormal 0 Normal   FTN 0 Normal 0 Normal   RRM 0 Normal 0 Normal   HKS 0 Normal 0 Normal         GAIT: Gait is   narrow-based and steady and the patient is  able to walk on heels, toes and in tandem without difficulty.    Romberg: Stable with eye(s) closed    RESULTS:  Monitoring labs:    No visits with results within 6 Month(s) from this visit.   Latest known visit with results is:   Office Visit on 02/05/2019   Component Date Value Ref Range Status     WBC 02/05/2019 4.7  4.0 - 11.0 10e9/L Final     RBC Count 02/05/2019 5.36  4.4 - 5.9 10e12/L Final     Hemoglobin 02/05/2019 15.8  13.3 - 17.7 g/dL Final     Hematocrit 02/05/2019 48.5  40.0 - 53.0 % Final     MCV 02/05/2019 91  78 - 100 fl Final     MCH 02/05/2019 29.5  26.5 - 33.0 pg Final     MCHC 02/05/2019 32.6  31.5 - 36.5 g/dL Final     RDW 02/05/2019 12.6  10.0 - 15.0 % Final     Platelet Count 02/05/2019 203  150 - 450 10e9/L Final     Diff Method 02/05/2019 Automated Method   Final     % Neutrophils 02/05/2019 71.1  % Final     % Lymphocytes 02/05/2019 19.7  % Final     % Monocytes 02/05/2019 8.6  % Final     % Eosinophils 02/05/2019 0.2  % Final     % Basophils 02/05/2019 0.2  % Final     % Immature Granulocytes 02/05/2019 0.2  % Final     Nucleated RBCs 02/05/2019 0  0 /100 Final     Absolute Neutrophil 02/05/2019 3.3  1.6 - 8.3 10e9/L Final     Absolute Lymphocytes 02/05/2019 0.9  0.8 - 5.3 10e9/L Final     Absolute Monocytes 02/05/2019 0.4  0.0 - 1.3 10e9/L Final     Absolute Eosinophils 02/05/2019 0.0  0.0 - 0.7 10e9/L Final     Absolute Basophils 02/05/2019 0.0  0.0 - 0.2  10e9/L Final     Abs Immature Granulocytes 02/05/2019 0.0  0 - 0.4 10e9/L Final     Absolute Nucleated RBC 02/05/2019 0.0   Final     Sodium 02/05/2019 138  133 - 144 mmol/L Final     Potassium 02/05/2019 4.1  3.4 - 5.3 mmol/L Final     Chloride 02/05/2019 105  94 - 109 mmol/L Final     Carbon Dioxide 02/05/2019 29  20 - 32 mmol/L Final     Anion Gap 02/05/2019 4  3 - 14 mmol/L Final     Glucose 02/05/2019 93  70 - 99 mg/dL Final     Urea Nitrogen 02/05/2019 15  7 - 30 mg/dL Final     Creatinine 02/05/2019 0.79  0.66 - 1.25 mg/dL Final     GFR Estimate 02/05/2019 >90  >60 mL/min/[1.73_m2] Final     GFR Estimate If Black 02/05/2019 >90  >60 mL/min/[1.73_m2] Final     Calcium 02/05/2019 8.6  8.5 - 10.1 mg/dL Final     Bilirubin Total 02/05/2019 0.4  0.2 - 1.3 mg/dL Final     Albumin 02/05/2019 4.0  3.4 - 5.0 g/dL Final     Protein Total 02/05/2019 8.0  6.8 - 8.8 g/dL Final     Alkaline Phosphatase 02/05/2019 93  40 - 150 U/L Final     ALT 02/05/2019 59  0 - 70 U/L Final     AST 02/05/2019 26  0 - 45 U/L Final     Vitamin D Deficiency screening 02/05/2019 37  20 - 75 ug/L Final     Vitamin B6 02/05/2019 25.6  20.0 - 125.0 nmol/L Final     Vitamin B12 02/05/2019 616  193 - 986 pg/mL Final     TSH 02/05/2019 1.38  0.40 - 4.00 mU/L Final   ]      MRI brain:    MRI Dated 6/7/19:  Moderate T2 disease burden with  1 enhancion lesion(s).  compared to MRI on 2017 there are   1 new T2 lesion(s).    ASSESSMENT/PLAN:  The patient is a 34-year-old gentleman with a past medical history of relapsing remitting multiple sclerosis who is presenting today as a follow-up.  Overall, the patient has mildly improved since his relapse.  He is still having difficulties with visual acuity.  I advised the patient that I expect him to continue to improve over the next few months.  In regards to his disease modifying therapy, the patient has had 2 episodes of relapse in the past 2 years.  Consequently, I think now would be the time to switch him  off of Rebif.  I spent time explaining to him his treatment options.  After much discussion, we decided that he should be switched to ocrelizumab.  I spent a prolonged period of time reviewing the risk and benefits with him.  After this discussion, he decided to go on it.  I will have the patient continue Rebif until the day before he starts ocrelizumab.  I will have the patient start taking 10 mg of immediate release Adderall in addition to his 10 mg of extended release for the better treat his fatigue.  I will also have the patient begin taking docusate in addition to his Senokot in order to help with his constipation.  I will plan to follow the patient up in 4 months.  I instructed the patient to call my office with any questions, concerns, issues or problems.    Follow-up on blood work  Start ocrelizumab  Stop Rebif 1 day prior to starting ocrelizumab  Start 10 mg of immediate release Adderall and additional 10 mg of extended release  Starting docusate   Follow up in 4 months    I spent 25 minutes in this visit, with >50% direct patient time spent counseling about prognosis, treatment options, and coordination of care.    Michael Spear MD [facility]  Staff Neurologist   08/06/19     (Chart documentation was completed in part with Dragon voice-recognition software. Even though reviewed, some grammatical, spelling, and word errors may remain.)

## 2019-08-06 NOTE — PATIENT INSTRUCTIONS
Will have you switch to Ocrevus.     Stop Rebif the day before your infusion    Will start combination sennakot docusate pill    Increase adderall. Take 10mg immediate release.    Will follow up in 4 months.    You saw a neurology provider today at the [facility].  You may have also met with the MS RN Care Coordinator.  In order to get a message to your MS Center provider, you should contact [phone] option 3 for the triage nurse line.    You should contact us via this protocol if you have any of the following symptoms:    New or worsening neurologic symptoms that persist for 24-48 hours, such as:  o New onset of pain or marked worsening of pain  o Difficulty with speech, swallowing, or breathing  o New onset of vertigo or dizziness  o Change in bowel or bladder function (incontinence, difficulty urinating)    Increasing difficulty in self care    Marked changes in vision (double vision, blurred vision, graying of vision)    Change in mobility    Change in cognitive function    Falling    Worsening numbness, tingling or pain with a change in function    Worsening fatigue lasting more than 2 weeks  If you had labs completed today, we will contact you with the results.  If you are active in [employer], they will be released to you there.  Otherwise, your results will be provided to you via mail or telephone call.  Some results take up to 2 weeks for completion.  If you haven t heard anything about your lab results within 2 weeks, you can call or send a [employer] message to obtain your results.  If you have an MRI scheduled in the week or two prior to your next appointment, we will go over the results at your scheduled follow up appointment.  If you are not scheduled to see your MS Center provider within about 2 weeks after your MRI, please call or send a [employer] message to obtain your results if you haven t heard anything within 2 weeks.  Please be aware that it takes at least 5  business days after routine MRIs for your results to be reviewed by both the radiologist and your doctor.  MRIs completed at facilities outside of the [place] system take about 2 weeks in order for the MRI disc to be mailed to our clinic and uploaded into your medical record.    Prescription refills should be faxed to us by your pharmacy.  Our fax number for prescription refills is [phone].  Please do your best to come to your appointments, and to arrive 15 minutes early to allow time for checking in.  [facility] reserves the right to terminate care of established patients if a patient misses three or more appointments in a clinic without providing notification within a 12-month period.    Developing Your Care Team  Individuals living with chronic illnesses like MS may be unaware that they are at risk for the same range of medical problems as everyone else.  This is why you must establish a relationship with other health care providers in addition to your Multiple Sclerosis doctor.  It can be difficult at times to figure out whether a health concern is related to your MS, or whether it is related to something else, such as hormonal changes, pseduoexacerbations, changes in your core body temperature, flu-like reactions to interferons, exercise, or infections.  Urinary tract infections (UTIs) are common culprits that can cause fatigue, weakness, or other  MS attack -like symptoms without classic symptoms of a UTI.  For this reason, if you call or come in to discuss symptoms, you may be asked to get in touch with your primary provider or another specialist, so that you receive the comprehensive care you need.  What is Multiple Sclerosis (MS)?  MS is a disease in which the nerve tissues in the brain and/or spinal cord are attacked by immune cells in the body.  These immune cells are present in everyone, and their normal role is to fight off infections.  In people with MS, these cells change  the way they function and cross into the nervous system.  Once there, they cause inflammation that damages the myelin (or the protective coating of a nerve cell, much like the plastic covering on an electrical cord) and parts of the nerve cell itself.  So far, a clear cause for this immune system dysfunction has not been found.  MS often starts out as the  relapsing-remitting  form.  This means there are episodes when you have symptoms, and other times when you recover to normal or near-normal.  Over time, if the damage to the nervous system continues, the disease can cause additional disability, such as difficulty walking.  If the relapses and nerve damage can be prevented with available medications, many patients with MS can go many years between relapses and have relatively little disability.  Remember: MS is a condition that changes and must be evaluated on an ongoing basis!  What is a Relapse? (Also called flare-ups, attacks, or exacerbations)  Relapses are due to the occurrence of inflammation in some part of the brain and/or spinal cord.  A relapse is new or recurrent symptoms which persist for at least 24 hours and sometimes worsen over 48 hours.  New symptoms need to be  by at least 1 month in order to be considered separate relapses. Most of the time, symptoms reach their maximal intensity within 2 weeks and then begin to slowly resolve.  At times, your symptoms may not recover fully for up to 6 months, depending on the severity of the episode.  The frequency of relapses is generally higher early in the disease, but can vary greatly among individuals with MS.  Improvement of symptoms for an individual is unpredictable with each relapse.    It is important to remember that an increase in symptoms and changes in function may not necessarily be a relapse.  There are other factors that contribute to such changes, such as hot weather, increased body temperature, infection/illness, stress and sleep  deprivation.  The worsening of symptoms may feel like a relapse when in reality it is not.  These episodes are referred to as pseudorelapses.  Once the underlying cause is addressed, symptoms usually fade away and you feel better.  If you experience a worsening of symptoms that lasts more than 48 hours and does not improve with cooling down, decreasing stress, or treatment of an infection, please call us and we can help to better determine whether you are having a pseudorelapse versus a relapse.

## 2019-08-06 NOTE — TELEPHONE ENCOUNTER
Patient was in for an office visit today with Dr. Spear, and the decision was made for him to start on Ocrevus infusions; The start form has been filled out and signed by both the patient and Dr. Spear; This has been faxed to [facility] [employer]; Patient would like to infuse at [facility] (phone [phone] fax [phone]); I called that infusion center, and they just asked that I send over the facesheet, infusion orders, last office visit and any other supporting information for the PA; This has been done accordingly; Patient's lab work is pending; I called Darin and updated him on this.    Airam Cazares, MS RN Care Coordinator

## 2019-08-06 NOTE — PROGRESS NOTES
MULTIPLE SCLEROSIS CLINIC AT THE [facility]  FOLLOWUP/ESTABLISHED PATIENT VISIT      PRINCIPAL NEUROLOGIC DIAGNOSIS: Multiple Sclerosis     Date of Onset: 2004  Date of Diagnosis: 2006  Initial Clinical Course: Relapsing Remitting  Current Clinical Course: Relapsing Remitting  Past Disease Modifying Therapy(ies): Copaxone  Current Disease Modifying Therapy(ies):Rebif  Most Recent MRI of the Brain: 6/7/19  Most Recent MRI of the Cervical Cord 10/2017  Most Recent MRI of the Thoracic Cord: NA  Most Recent Lumbar Puncture: 2008  Most Recent OCT: NA   Most Recent JCV:  NA  Most Recent Remote Hepatitis Panel:  NA  Most Recent VZV IgG:  NA  Most Recent TB Quant:  NA        CHIEF COMPLAINT: Follow up on DMT       INTERVAL HISTORY:    The patient reports that he thinks his eyes are still not back to normal. He reports that if he goes fast at thing or focusing on things his vision will be a little hazy. He reports that he has issues with focusing. He reports that there is a slightly dizzy like thinks. He reports that the closing his eyes or looks away. He reports that when he first had it there where were floating dots. He reports that it is mother bothersome. He reports that he has an itching. The patient reports a strong foriengn body sensation. He reports that it is more is haze. He reports that sometimes it goes away. He does mainly notices when he is at work.     Issues with current MS therapy: Tolerating DMT without issue    REVIEW OF SYSTEMS:    Mood: unchanged  Spasticity:none  Bladder: none  Bowel: unchanged  Pain related to today's visit:reviewed on nursing intake documentation  Fatigue: unchanged  Sleep: well/ no problem  Memory/Concentration: unchanged      Otherwise 10 point ROS was neg other than the symptoms noted above.    PAST HISTORY was reviewed and updated:      MEDICATIONS and ALLERGIES were reviewed and updated.    SOCIAL HISTORY was reviewed and updated:      EXAM:    PHYSICAL EXAMINATION:  "  VITAL SIGNS: Vital signs:      BP: 133/87 Pulse: 102           Height: 185.4 cm (6' 1\") Weight: 96.2 kg (212 lb)  Estimated body mass index is 27.97 kg/m  as calculated from the following:    Height as of this encounter: 1.854 m (6' 1\").    Weight as of this encounter: 96.2 kg (212 lb).          GENERAL: The patient is a well-nourished  who presents to the evaluation alone.  NEUROLOGIC:   MENTAL STATUS: Alert,awake and  oriented times four.   CRANIAL NERVES: Visual acuity is 20/20 in right eyes and 20/30 in the left with a near card (without). Visual fields are full to confrontation. The pupils are  round and react to light and there is no Blayne Diamond pupil.  Extraocular movements are  intact with no  internuclear ophthalmoplegia. No nystagmus. Facial strength and sensation are  normal. Hearing is  normal. Palate elevation and tongue protrusion are  normal.   POWER:      Motor     Upper         Right Left   Shoulder Abduction 5 5   Elbow Flexion 5 5   Elbow Extension 5 5   Wrist Extension 5 5   Digit Extension 5 5   Digit Flexion 5 5   APB 5 5   Tone 0 0   Lower          Right Left   Hip Flexion 5 5   Knee Extension 5 5   Knee Flexion 5 5   Foot Dorsiflexion 5 5   Foot Plantar Flexion 5 5   EH 5 5   Toe Flexion 5 5   Tone 0 0             Grade Description   0 No increase in muscle tone   1 Slight increase in muscle tone, manifested by a catch and release or by minimal resistance at the end of the range of motion when the affected part(s) is moved in flexion or extension   1+ Slight increase in muscle tone, manifested by a catch, followed by minimal resistance throughout the remainder (less than half) of the ROM   2 More marked increase in muscle tone through most of the ROM, but affected part(s) easily moved   3 Considerable increase in muscle tone, passive movement difficult   4 Affected part(s) rigid in flexion or extension               SENSORY:      Light touch:  intact except for the right digit 1-3 and the " right palm of the right hand.              MOTOR/CEREBELLAR:     Right Left   RRM 1 Abnormal 0 Normal   BOBBY 1 Abnormal 0 Normal   FTN 0 Normal 0 Normal   RRM 0 Normal 0 Normal   HKS 0 Normal 0 Normal               GAIT: Gait is   narrow-based and steady and the patient is  able to walk on heels, toes and in tandem without difficulty.    Romberg: Stable with eye(s) closed      RESULTS:  Monitoring labs:    No visits with results within 6 Month(s) from this visit.   Latest known visit with results is:   Office Visit on 02/05/2019   Component Date Value Ref Range Status     WBC 02/05/2019 4.7  4.0 - 11.0 10e9/L Final     RBC Count 02/05/2019 5.36  4.4 - 5.9 10e12/L Final     Hemoglobin 02/05/2019 15.8  13.3 - 17.7 g/dL Final     Hematocrit 02/05/2019 48.5  40.0 - 53.0 % Final     MCV 02/05/2019 91  78 - 100 fl Final     MCH 02/05/2019 29.5  26.5 - 33.0 pg Final     MCHC 02/05/2019 32.6  31.5 - 36.5 g/dL Final     RDW 02/05/2019 12.6  10.0 - 15.0 % Final     Platelet Count 02/05/2019 203  150 - 450 10e9/L Final     Diff Method 02/05/2019 Automated Method   Final     % Neutrophils 02/05/2019 71.1  % Final     % Lymphocytes 02/05/2019 19.7  % Final     % Monocytes 02/05/2019 8.6  % Final     % Eosinophils 02/05/2019 0.2  % Final     % Basophils 02/05/2019 0.2  % Final     % Immature Granulocytes 02/05/2019 0.2  % Final     Nucleated RBCs 02/05/2019 0  0 /100 Final     Absolute Neutrophil 02/05/2019 3.3  1.6 - 8.3 10e9/L Final     Absolute Lymphocytes 02/05/2019 0.9  0.8 - 5.3 10e9/L Final     Absolute Monocytes 02/05/2019 0.4  0.0 - 1.3 10e9/L Final     Absolute Eosinophils 02/05/2019 0.0  0.0 - 0.7 10e9/L Final     Absolute Basophils 02/05/2019 0.0  0.0 - 0.2 10e9/L Final     Abs Immature Granulocytes 02/05/2019 0.0  0 - 0.4 10e9/L Final     Absolute Nucleated RBC 02/05/2019 0.0   Final     Sodium 02/05/2019 138  133 - 144 mmol/L Final     Potassium 02/05/2019 4.1  3.4 - 5.3 mmol/L Final     Chloride 02/05/2019 105  94 -  109 mmol/L Final     Carbon Dioxide 02/05/2019 29  20 - 32 mmol/L Final     Anion Gap 02/05/2019 4  3 - 14 mmol/L Final     Glucose 02/05/2019 93  70 - 99 mg/dL Final     Urea Nitrogen 02/05/2019 15  7 - 30 mg/dL Final     Creatinine 02/05/2019 0.79  0.66 - 1.25 mg/dL Final     GFR Estimate 02/05/2019 >90  >60 mL/min/[1.73_m2] Final     GFR Estimate If Black 02/05/2019 >90  >60 mL/min/[1.73_m2] Final     Calcium 02/05/2019 8.6  8.5 - 10.1 mg/dL Final     Bilirubin Total 02/05/2019 0.4  0.2 - 1.3 mg/dL Final     Albumin 02/05/2019 4.0  3.4 - 5.0 g/dL Final     Protein Total 02/05/2019 8.0  6.8 - 8.8 g/dL Final     Alkaline Phosphatase 02/05/2019 93  40 - 150 U/L Final     ALT 02/05/2019 59  0 - 70 U/L Final     AST 02/05/2019 26  0 - 45 U/L Final     Vitamin D Deficiency screening 02/05/2019 37  20 - 75 ug/L Final     Vitamin B6 02/05/2019 25.6  20.0 - 125.0 nmol/L Final     Vitamin B12 02/05/2019 616  193 - 986 pg/mL Final     TSH 02/05/2019 1.38  0.40 - 4.00 mU/L Final   ]      MRI brain:    MRI Dated 6/7/19:  Moderate T2 disease burden with  1 enhancion lesion(s).  compared to MRI on 2017 there are   1 new T2 lesion(s).    ASSESSMENT/PLAN:  The patient is a 34-year-old gentleman with a past medical history of relapsing remitting multiple sclerosis who is presenting today as a follow-up.  Overall, the patient has mildly improved since his relapse.  He is still having difficulties with visual acuity.  I advised the patient that I expect him to continue to improve over the next few months.  In regards to his disease modifying therapy, the patient has had 2 episodes of relapse in the past 2 years.  Consequently, I think now would be the time to switch him off of Rebif.  I spent time explaining to him his treatment options.  After much discussion, we decided that he should be switched to ocrelizumab.  I spent a prolonged period of time reviewing the risk and benefits with him.  After this discussion, he decided to go  on it.  I will have the patient continue Rebif until the day before he starts ocrelizumab.  I will have the patient start taking 10 mg of immediate release Adderall in addition to his 10 mg of extended release for the better treat his fatigue.  I will also have the patient begin taking docusate in addition to his Senokot in order to help with his constipation.  I will plan to follow the patient up in 4 months.  I instructed the patient to call my office with any questions, concerns, issues or problems.    Follow-up on blood work  Start ocrelizumab  Stop Rebif 1 day prior to starting ocrelizumab  Start 10 mg of immediate release Adderall and additional 10 mg of extended release  Starting docusate   Follow up in 4 months        I spent 25 minutes in this visit, with >50% direct patient time spent counseling about prognosis, treatment options, and coordination of care.    Michael Spear MD [facility]  Staff Neurologist   08/06/19       (Chart documentation was completed in part with Dragon voice-recognition software. Even though reviewed, some grammatical, spelling, and word errors may remain.)

## 2019-08-07 ENCOUNTER — AMBULATORY - HEALTHEAST (OUTPATIENT)
Dept: PHARMACY | Facility: HOSPITAL | Age: 34
End: 2019-08-07

## 2019-08-07 DIAGNOSIS — G35 MS (MULTIPLE SCLEROSIS) (H): ICD-10-CM

## 2019-08-07 LAB
GAMMA INTERFERON BACKGROUND BLD IA-ACNC: 0.02 IU/ML
M TB IFN-G BLD-IMP: NEGATIVE
M TB IFN-G CD4+ BCKGRND COR BLD-ACNC: >10 IU/ML
MITOGEN IGNF BCKGRD COR BLD-ACNC: 0 IU/ML
MITOGEN IGNF BCKGRD COR BLD-ACNC: 0.01 IU/ML

## 2019-08-08 LAB
HCV RNA SERPL NAA+PROBE-ACNC: NORMAL [IU]/ML
HCV RNA SERPL NAA+PROBE-LOG IU: NORMAL LOG IU/ML

## 2019-08-13 LAB — LAB SCANNED RESULT: ABNORMAL

## 2019-08-20 NOTE — TELEPHONE ENCOUNTER
Per another note, the patient cannot infuse at [facility], and then stated he needed to think about it more; I have not heard anything more from the patient; I left [facility] for Darin advising that I was looking for an update and checking in to see if there are any questions I can answer for him; Will wait to see if the patient calls back.    Airam Cazares, MS RN Care Coordinator

## 2019-09-07 ENCOUNTER — MYC REFILL (OUTPATIENT)
Dept: NEUROLOGY | Facility: CLINIC | Age: 34
End: 2019-09-07

## 2019-09-07 DIAGNOSIS — K59.01 SLOW TRANSIT CONSTIPATION: ICD-10-CM

## 2019-09-07 DIAGNOSIS — G35 MULTIPLE SCLEROSIS (H): ICD-10-CM

## 2019-09-09 NOTE — TELEPHONE ENCOUNTER
Patient sent [employer] message requesting refill of their Adderall; Patient was last seen in August and has follow up appointment in December with Dr. Spear; Pended rx to Dr. Leyva for signature and will mail to the pharmacy once signed.    Airam Cazarse, MS RN Care Coordinator

## 2019-09-12 RX ORDER — DEXTROAMPHETAMINE SACCHARATE, AMPHETAMINE ASPARTATE, DEXTROAMPHETAMINE SULFATE AND AMPHETAMINE SULFATE 5; 5; 5; 5 MG/1; MG/1; MG/1; MG/1
20 TABLET ORAL DAILY
Qty: 30 TABLET | Refills: 0 | Status: SHIPPED | OUTPATIENT
Start: 2019-09-12 | End: 2019-11-11

## 2019-09-12 NOTE — TELEPHONE ENCOUNTER
Rx signed by Dr Leyva in Dr Spear absence. Rx mailed to pharmacy as requested ( [facility] [place]) Patient informed via [employer] message  Albertina Funez MA

## 2019-09-12 NOTE — TELEPHONE ENCOUNTER
Approved refill of Adderall 20 mg, quantity 30 with no refills for this patient of Dr. Spear as he is away from the office until next week and unavailable to sign prescriptions.

## 2019-09-16 NOTE — TELEPHONE ENCOUNTER
Hi    I would let it be. At this point if he wants the therapy, then he can call us.    Thanks  Michael Spear MD, MD [facility]  Staff Neurologist   09/16/19     e

## 2019-09-16 NOTE — TELEPHONE ENCOUNTER
Dr. Spear, do you want me to keep calling the patient, or let this be and let him call us if he decides to pursue the Ocrevus? Thank you.    Airam Cazares, MS RN Care Coordinator

## 2019-10-06 ENCOUNTER — MYC REFILL (OUTPATIENT)
Dept: NEUROLOGY | Facility: CLINIC | Age: 34
End: 2019-10-06

## 2019-10-06 DIAGNOSIS — G35 MULTIPLE SCLEROSIS (H): ICD-10-CM

## 2019-10-07 RX ORDER — DEXTROAMPHETAMINE SACCHARATE, AMPHETAMINE ASPARTATE, DEXTROAMPHETAMINE SULFATE AND AMPHETAMINE SULFATE 2.5; 2.5; 2.5; 2.5 MG/1; MG/1; MG/1; MG/1
10 TABLET ORAL DAILY
Qty: 30 TABLET | Refills: 0 | Status: SHIPPED | OUTPATIENT
Start: 2019-10-07 | End: 2019-11-11

## 2019-10-07 NOTE — TELEPHONE ENCOUNTER
Patient sent [employer] message requesting refill of their Adderall 10mg; Patient was last seen in August and has follow up appointment in December with Dr. Spear; Pended rx to Dr. Spear for signature and will send electronically to the pharmacy once signed.    Airam Cazares, MS RN Care Coordinator

## 2019-11-08 ENCOUNTER — HEALTH MAINTENANCE LETTER (OUTPATIENT)
Age: 34
End: 2019-11-08

## 2019-11-10 ENCOUNTER — MYC REFILL (OUTPATIENT)
Dept: NEUROLOGY | Facility: CLINIC | Age: 34
End: 2019-11-10

## 2019-11-10 DIAGNOSIS — R53.82 CHRONIC FATIGUE: ICD-10-CM

## 2019-11-10 DIAGNOSIS — G35 MULTIPLE SCLEROSIS (H): ICD-10-CM

## 2019-11-10 RX ORDER — DEXTROAMPHETAMINE SACCHARATE, AMPHETAMINE ASPARTATE, DEXTROAMPHETAMINE SULFATE AND AMPHETAMINE SULFATE 2.5; 2.5; 2.5; 2.5 MG/1; MG/1; MG/1; MG/1
TABLET ORAL
Qty: 60 TABLET | Refills: 0 | Status: CANCELLED | OUTPATIENT
Start: 2019-11-10

## 2019-11-11 RX ORDER — DEXTROAMPHETAMINE SACCHARATE, AMPHETAMINE ASPARTATE, DEXTROAMPHETAMINE SULFATE AND AMPHETAMINE SULFATE 2.5; 2.5; 2.5; 2.5 MG/1; MG/1; MG/1; MG/1
10 TABLET ORAL DAILY
Qty: 30 TABLET | Refills: 0 | Status: SHIPPED | OUTPATIENT
Start: 2019-11-11 | End: 2019-12-12

## 2019-11-25 ASSESSMENT — ENCOUNTER SYMPTOMS
POLYPHAGIA: 0
POLYDIPSIA: 0
NIGHT SWEATS: 0
DECREASED APPETITE: 0
EYE IRRITATION: 1
WEIGHT GAIN: 0
INCREASED ENERGY: 0
FEVER: 0
CHILLS: 0
SMELL DISTURBANCE: 0
SINUS CONGESTION: 1
FATIGUE: 1
SORE THROAT: 0
HOARSE VOICE: 0
DOUBLE VISION: 0
EYE REDNESS: 0
EYE WATERING: 1
WEIGHT LOSS: 0
HALLUCINATIONS: 0
TROUBLE SWALLOWING: 0
ALTERED TEMPERATURE REGULATION: 0
NECK MASS: 0
EYE PAIN: 0
SINUS PAIN: 0
TASTE DISTURBANCE: 0

## 2019-11-26 ENCOUNTER — OFFICE VISIT (OUTPATIENT)
Dept: NEUROLOGY | Facility: CLINIC | Age: 34
End: 2019-11-26
Attending: PSYCHIATRY & NEUROLOGY
Payer: COMMERCIAL

## 2019-11-26 VITALS
HEART RATE: 83 BPM | DIASTOLIC BLOOD PRESSURE: 85 MMHG | HEIGHT: 73 IN | BODY MASS INDEX: 28.26 KG/M2 | SYSTOLIC BLOOD PRESSURE: 137 MMHG | WEIGHT: 213.2 LBS

## 2019-11-26 DIAGNOSIS — G35 MS (MULTIPLE SCLEROSIS) (H): ICD-10-CM

## 2019-11-26 DIAGNOSIS — H46.9 OPTIC NEURITIS: ICD-10-CM

## 2019-11-26 DIAGNOSIS — K21.9 GASTROESOPHAGEAL REFLUX DISEASE WITHOUT ESOPHAGITIS: Primary | ICD-10-CM

## 2019-11-26 PROCEDURE — G0463 HOSPITAL OUTPT CLINIC VISIT: HCPCS | Mod: ZF

## 2019-11-26 ASSESSMENT — MIFFLIN-ST. JEOR: SCORE: 1955.95

## 2019-11-26 ASSESSMENT — PAIN SCALES - GENERAL: PAINLEVEL: NO PAIN (0)

## 2019-11-26 NOTE — NURSING NOTE
Chief Complaint   Patient presents with     Consult     P ESTABLISHING CARE     Albertina Funez MA

## 2019-11-26 NOTE — LETTER
"     RE: Darin Maier  [address] [facility] [address]     Dear Colleague,    Thank you for referring your patient, Darin Maier, to the [facility] MULTIPLE SCLEROSIS at [facility]. Please see a copy of my visit note below.    Service Date: 11/26/2019      REASON FOR VISIT:  Darin Maier is a 34-year-old man who I am seeing for the first time today for multiple sclerosis.  He has recently been followed by Dr. Spear who has left our clinic.  Before that he was followed by Dr. Kinney and before him with Dr. Ibrahim.  He was last seen in clinic by Dr. Spear in August.      HISTORY OF PRESENT ILLNESS:  Darin's neurologic history dates back to 2004 when he developed numbness in the right leg lasting about a month and spontaneously resolving.  In 2006, he developed numbness in the right hand which again resolved after a similar time course without any treatment.  He was diagnosed with MS in 2008 after he developed tingling on the right side of his face and in the left hand.  CSF showed intrathecal IgG synthesis and MRI showed lesions typical for MS.  He was initially treated with Copaxone and this was changed to Rebif many years ago, for reasons I am not certain of, but I suspect MRI lesion accumulation.  He has been well-controlled on the Rebif overall and tolerates it well, but this summer he developed eye pain and \"floaters\" in the left eye.  Dr. Spear obtained MRI of the orbits which showed an enhancing left optic nerve.  He had 2 courses of steroids but feels his vision has not returned to baseline.  He notices some blurriness if he closes his right eye.  He is a bow susan and is finding it harder to see/aim the dusk.  Other than the blurry vision, his residual symptoms include fatigue, neuropathic pain the feet \"like walking on gravel\" and chronic constipation.  He uses Senokot for the latter.  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     He has no significant past medical history other than the MS.      PHYSICAL EXAMINATION:   VITAL SIGNS:  Blood pressure 137/85.  Pulse is 83.  Weight 213 pounds.   GENERAL:  He is alert and oriented.  Affect is bright and language functions are normal.     NEUROLOGIC:   Visual acuity is 20/20 in each eye and he correctly identifies 11 out of 11 Ishihara plates with the left eye.  Ophthalmoscopic exam shows some left optic disk pallor but pupils are normally reactive without afferent pupillary defect.  Eye movements are full without diplopia or nystagmus.  Facial strength and sensation are normal.  Muscle bulk, tone, strength and dexterity are normal in the arms and legs.  Light touch is intact in all 4 limbs.  Finger tapping, toe tapping and finger-nose-finger are normal.  Deep tendon reflexes are asymmetric at the biceps, brisker on the left.  They are normal and symmetric at the knees.  His gait is narrow-based and stable with normal tandem walk and negative Romberg.      IMPRESSION:  Relapsing remitting multiple sclerosis, with relapse of left optic neuritis while on treatment with Rebif.        I spent 52 minutes with Darin, greater than 50% of which was spent in counseling and coordination of care.  We also reviewed his most recent MRI together.  It showed a moderate burden of T2 hyperintense lesions in the cerebral white matter with a size, shape and distribution typical for MS.  There were radially-oriented periventricular lesions, juxtacortical lesions and at least 1 small pontine lesion.  There were no new MRI lesions other than the enhancing left optic nerve.  We discussed the implications of this new relapse.  I told him that the goal of treating MS is no relapse, new lesions, exam changes or other evidence of disease activity, and in that regard switching treatment would be appropriate.  Dr. Spear had recommended ocrelizumab but Darin has been hesitant about that.  I went over the  options.  I told him B-cell depletion would be a reasonable option as would dimethyl fumarate.  Teriflunomide could also be an option, but would probably not be my choice as it would be seen as rather a lateral move compared to the Rebif.  Jessy himself is wondering if staying on the Rebif would be an option.  I told him it certainly would as we know that none of these medicines are perfectly effective.  He tells me he was under a lot of stress at the time with his family and his wife's medical issues and he thinks that is partly responsible for his relapse, and I agree that stress can contribute to those.  Ultimately, we opted to continue him on the Rebif and follow with MRI surveillance.  Any future lesion accumulation or new relapse would be a strong indication for treatment change.      PLAN:  Follow up with me in 6 months.  We will plan to repeat the brain MRI in about 1 year.      Christian Phelps MD      D: 2019   T: 2019   MT: AKA      Name:     JESSY ARREOLA   MRN:      [id number]        Account:      [id number]   :      1985           Service Date: 2019      Document: [id number]

## 2019-12-02 NOTE — PROGRESS NOTES
"Service Date: 11/26/2019      REASON FOR VISIT:  Darin Maier is a 34-year-old man who I am seeing for the first time today for multiple sclerosis.  He has recently been followed by Dr. Spear who has left our clinic.  Before that he was followed by Dr. Kinney and before him with Dr. Ibrahim.  He was last seen in clinic by Dr. Spear in August.      HISTORY OF PRESENT ILLNESS:  Darin's neurologic history dates back to 2004 when he developed numbness in the right leg lasting about a month and spontaneously resolving.  In 2006, he developed numbness in the right hand which again resolved after a similar time course without any treatment.  He was diagnosed with MS in 2008 after he developed tingling on the right side of his face and in the left hand.  CSF showed intrathecal IgG synthesis and MRI showed lesions typical for MS.  He was initially treated with Copaxone and this was changed to Rebif many years ago, for reasons I am not certain of, but I suspect MRI lesion accumulation.  He has been well-controlled on the Rebif overall and tolerates it well, but this summer he developed eye pain and \"floaters\" in the left eye.  Dr. Spear obtained MRI of the orbits which showed an enhancing left optic nerve.  He had 2 courses of steroids but feels his vision has not returned to baseline.  He notices some blurriness if he closes his right eye.  He is a bow susan and is finding it harder to see/aim the dusk.  Other than the blurry vision, his residual symptoms include fatigue, neuropathic pain the feet \"like walking on gravel\" and chronic constipation.  He uses Senokot for the latter.  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     He has no significant past medical history other than the MS.      PHYSICAL EXAMINATION:   VITAL SIGNS:  Blood pressure 137/85.  Pulse is 83.  Weight 213 pounds.   GENERAL:  He is alert and oriented.  Affect is bright and language functions are normal.     NEUROLOGIC:   " Visual acuity is 20/20 in each eye and he correctly identifies 11 out of 11 Ishihara plates with the left eye.  Ophthalmoscopic exam shows some left optic disk pallor but pupils are normally reactive without afferent pupillary defect.  Eye movements are full without diplopia or nystagmus.  Facial strength and sensation are normal.  Muscle bulk, tone, strength and dexterity are normal in the arms and legs.  Light touch is intact in all 4 limbs.  Finger tapping, toe tapping and finger-nose-finger are normal.  Deep tendon reflexes are asymmetric at the biceps, brisker on the left.  They are normal and symmetric at the knees.  His gait is narrow-based and stable with normal tandem walk and negative Romberg.      IMPRESSION:  Relapsing remitting multiple sclerosis, with relapse of left optic neuritis while on treatment with Rebif.        I spent 52 minutes with Darin, greater than 50% of which was spent in counseling and coordination of care.  We also reviewed his most recent MRI together.  It showed a moderate burden of T2 hyperintense lesions in the cerebral white matter with a size, shape and distribution typical for MS.  There were radially-oriented periventricular lesions, juxtacortical lesions and at least 1 small pontine lesion.  There were no new MRI lesions other than the enhancing left optic nerve.  We discussed the implications of this new relapse.  I told him that the goal of treating MS is no relapse, new lesions, exam changes or other evidence of disease activity, and in that regard switching treatment would be appropriate.  Dr. Spear had recommended ocrelizumab but Darin has been hesitant about that.  I went over the options.  I told him B-cell depletion would be a reasonable option as would dimethyl fumarate.  Teriflunomide could also be an option, but would probably not be my choice as it would be seen as rather a lateral move compared to the Rebif.  Darin himself is wondering if staying on the  Rebif would be an option.  I told him it certainly would as we know that none of these medicines are perfectly effective.  He tells me he was under a lot of stress at the time with his family and his wife's medical issues and he thinks that is partly responsible for his relapse, and I agree that stress can contribute to those.  Ultimately, we opted to continue him on the Rebif and follow with MRI surveillance.  Any future lesion accumulation or new relapse would be a strong indication for treatment change.      PLAN:  Follow up with me in 6 months.  We will plan to repeat the brain MRI in about 1 year.      MD JATINDER Bruner MD             D: 2019   T: 2019   MT: AKA      Name:     JESSY ARREOLA   MRN:      [id number]        Account:      [id number]   :      1985           Service Date: 2019      Document: [id number]

## 2019-12-12 ENCOUNTER — MYC REFILL (OUTPATIENT)
Dept: NEUROLOGY | Facility: CLINIC | Age: 34
End: 2019-12-12

## 2019-12-12 DIAGNOSIS — G35 MULTIPLE SCLEROSIS (H): ICD-10-CM

## 2019-12-16 RX ORDER — DEXTROAMPHETAMINE SACCHARATE, AMPHETAMINE ASPARTATE, DEXTROAMPHETAMINE SULFATE AND AMPHETAMINE SULFATE 2.5; 2.5; 2.5; 2.5 MG/1; MG/1; MG/1; MG/1
10 TABLET ORAL DAILY
Qty: 30 TABLET | Refills: 0 | Status: SHIPPED | OUTPATIENT
Start: 2019-12-16 | End: 2020-01-17

## 2019-12-16 NOTE — TELEPHONE ENCOUNTER
Patient sent [employer] message requesting refill of their Adderall 10mg; Patient was last seen in November and has follow up appointment in June with Dr. Phelps; Pended rx to Dr. Phelps for signature and will send electronically to the pharmacy once signed.    Airam Cazares MS RN Care Coordinator

## 2020-01-14 DIAGNOSIS — G35 MULTIPLE SCLEROSIS (H): ICD-10-CM

## 2020-01-14 DIAGNOSIS — R52 PAIN: ICD-10-CM

## 2020-01-14 NOTE — TELEPHONE ENCOUNTER
Dr. Phelps, this is the first time you will have prescribed gabapentin for patient. Please approve pended rx. Thanks.

## 2020-01-15 RX ORDER — GABAPENTIN 300 MG/1
CAPSULE ORAL
Qty: 120 CAPSULE | Refills: 11 | Status: SHIPPED | OUTPATIENT
Start: 2020-01-15 | End: 2021-01-18

## 2020-01-17 ENCOUNTER — MYC REFILL (OUTPATIENT)
Dept: NEUROLOGY | Facility: CLINIC | Age: 35
End: 2020-01-17

## 2020-01-17 DIAGNOSIS — G35 MULTIPLE SCLEROSIS (H): ICD-10-CM

## 2020-01-17 DIAGNOSIS — K21.9 GASTROESOPHAGEAL REFLUX DISEASE WITHOUT ESOPHAGITIS: ICD-10-CM

## 2020-01-17 NOTE — TELEPHONE ENCOUNTER
Patient sent [employer]x message requesting refill of their Adderall and omeprazole; Patient was last seen in November and has follow up appointment in June with Dr. Phelps. Omeprazole filled per MS protocol. Pended Adderall rx to Dr. Phelps for signature

## 2020-01-20 RX ORDER — DEXTROAMPHETAMINE SACCHARATE, AMPHETAMINE ASPARTATE, DEXTROAMPHETAMINE SULFATE AND AMPHETAMINE SULFATE 2.5; 2.5; 2.5; 2.5 MG/1; MG/1; MG/1; MG/1
10 TABLET ORAL DAILY
Qty: 30 TABLET | Refills: 0 | Status: SHIPPED | OUTPATIENT
Start: 2020-01-20 | End: 2020-02-19

## 2020-01-20 RX ORDER — DEXTROAMPHETAMINE SACCHARATE, AMPHETAMINE ASPARTATE MONOHYDRATE, DEXTROAMPHETAMINE SULFATE AND AMPHETAMINE SULFATE 5; 5; 5; 5 MG/1; MG/1; MG/1; MG/1
20 CAPSULE, EXTENDED RELEASE ORAL DAILY
Qty: 30 CAPSULE | Refills: 0 | Status: SHIPPED | OUTPATIENT
Start: 2020-01-20 | End: 2020-02-19

## 2020-02-10 DIAGNOSIS — G35 MULTIPLE SCLEROSIS (H): ICD-10-CM

## 2020-02-10 NOTE — TELEPHONE ENCOUNTER
Received refill request for Rebif from [facility]; Patient was last seen on 11/26/2019 and has follow up appointment on 6/2/2020 with Mattie. Pended to MS pool for review/approval    Albertina Funez MA

## 2020-02-19 ENCOUNTER — MYC REFILL (OUTPATIENT)
Dept: NEUROLOGY | Facility: CLINIC | Age: 35
End: 2020-02-19

## 2020-02-19 DIAGNOSIS — G35 MULTIPLE SCLEROSIS (H): ICD-10-CM

## 2020-02-19 NOTE — TELEPHONE ENCOUNTER
Patient sent [employer] message requesting refill of their Adderal; Patient was last seen in November and has follow up appointment in June with Dr. Phelps. Pended rx to Dr. Phelps

## 2020-02-20 RX ORDER — DEXTROAMPHETAMINE SACCHARATE, AMPHETAMINE ASPARTATE, DEXTROAMPHETAMINE SULFATE AND AMPHETAMINE SULFATE 2.5; 2.5; 2.5; 2.5 MG/1; MG/1; MG/1; MG/1
10 TABLET ORAL DAILY
Qty: 30 TABLET | Refills: 0 | Status: SHIPPED | OUTPATIENT
Start: 2020-02-20 | End: 2020-03-24

## 2020-02-20 RX ORDER — DEXTROAMPHETAMINE SACCHARATE, AMPHETAMINE ASPARTATE MONOHYDRATE, DEXTROAMPHETAMINE SULFATE AND AMPHETAMINE SULFATE 5; 5; 5; 5 MG/1; MG/1; MG/1; MG/1
20 CAPSULE, EXTENDED RELEASE ORAL DAILY
Qty: 30 CAPSULE | Refills: 0 | Status: SHIPPED | OUTPATIENT
Start: 2020-02-20 | End: 2020-03-24

## 2020-03-11 ENCOUNTER — TELEPHONE (OUTPATIENT)
Dept: NEUROLOGY | Facility: CLINIC | Age: 35
End: 2020-03-11

## 2020-03-11 NOTE — TELEPHONE ENCOUNTER
PA Initiation    Medication: Prior Auth renewal (Rebif)  Insurance Company: [employer] - Phone [phone] Fax [phone]  Pharmacy Filling the Rx: [facility] - [place], [place] - [facility] [address]  Filling Pharmacy Phone: [phone]  Filling Pharmacy Fax: [phone]  Start Date: 3/11/2020

## 2020-03-13 NOTE — TELEPHONE ENCOUNTER
Prior Authorization Approval    Authorization Effective Date: 3/11/2020  Authorization Expiration Date: 3/11/2025  Medication: Prior Auth renewal (Rebif)  Approved Dose/Quantity: 6ml per 28 days  Reference #: [id number]   Insurance Company: [employer] - Phone [phone] Fax [phone]  Expected CoPay:       CoPay Card Available:      Foundation Assistance Needed:    Which Pharmacy is filling the prescription (Not needed for infusion/clinic administered): [facility] - [place], [place] - [address]  Pharmacy Notified: Yes  Patient Notified: Yes

## 2020-03-24 ENCOUNTER — MYC REFILL (OUTPATIENT)
Dept: NEUROLOGY | Facility: CLINIC | Age: 35
End: 2020-03-24

## 2020-03-24 DIAGNOSIS — G35 MULTIPLE SCLEROSIS (H): ICD-10-CM

## 2020-03-24 RX ORDER — DEXTROAMPHETAMINE SACCHARATE, AMPHETAMINE ASPARTATE MONOHYDRATE, DEXTROAMPHETAMINE SULFATE AND AMPHETAMINE SULFATE 5; 5; 5; 5 MG/1; MG/1; MG/1; MG/1
20 CAPSULE, EXTENDED RELEASE ORAL DAILY
Qty: 30 CAPSULE | Refills: 0 | Status: SHIPPED | OUTPATIENT
Start: 2020-03-24 | End: 2020-04-24

## 2020-03-24 RX ORDER — DEXTROAMPHETAMINE SACCHARATE, AMPHETAMINE ASPARTATE, DEXTROAMPHETAMINE SULFATE AND AMPHETAMINE SULFATE 2.5; 2.5; 2.5; 2.5 MG/1; MG/1; MG/1; MG/1
10 TABLET ORAL DAILY
Qty: 30 TABLET | Refills: 0 | Status: SHIPPED | OUTPATIENT
Start: 2020-03-24 | End: 2020-04-24

## 2020-03-24 NOTE — TELEPHONE ENCOUNTER
Patient sent [employer] message requesting refill of their Adderall prescriptions; Patient was last seen in November and has follow up appointment in June with Dr. Phelps; Pended rx to Dr. Phelps for signature and will send electronically to the pharmacy once signed.    Airam Cazares, MS RN Care Coordinator

## 2020-04-24 ENCOUNTER — MYC REFILL (OUTPATIENT)
Dept: NEUROLOGY | Facility: CLINIC | Age: 35
End: 2020-04-24

## 2020-04-24 DIAGNOSIS — G35 MULTIPLE SCLEROSIS (H): ICD-10-CM

## 2020-04-27 RX ORDER — DEXTROAMPHETAMINE SACCHARATE, AMPHETAMINE ASPARTATE MONOHYDRATE, DEXTROAMPHETAMINE SULFATE AND AMPHETAMINE SULFATE 5; 5; 5; 5 MG/1; MG/1; MG/1; MG/1
20 CAPSULE, EXTENDED RELEASE ORAL DAILY
Qty: 30 CAPSULE | Refills: 0 | Status: SHIPPED | OUTPATIENT
Start: 2020-04-27 | End: 2020-05-26

## 2020-04-27 RX ORDER — DEXTROAMPHETAMINE SACCHARATE, AMPHETAMINE ASPARTATE, DEXTROAMPHETAMINE SULFATE AND AMPHETAMINE SULFATE 2.5; 2.5; 2.5; 2.5 MG/1; MG/1; MG/1; MG/1
10 TABLET ORAL DAILY
Qty: 30 TABLET | Refills: 0 | Status: SHIPPED | OUTPATIENT
Start: 2020-04-27 | End: 2020-05-26

## 2020-04-27 NOTE — TELEPHONE ENCOUNTER
Patient sent Numerous message requesting refill of their Adderall prescriptions; Patient was last seen in November and has follow up appointment in June with Dr. Phelps; Pended rx to Dr. Phelps for signature and will send electronically to the pharmacy once signed.    Airam Cazares, MS RN Care Coordinator

## 2020-05-26 ENCOUNTER — MYC REFILL (OUTPATIENT)
Dept: NEUROLOGY | Facility: CLINIC | Age: 35
End: 2020-05-26

## 2020-05-26 DIAGNOSIS — K21.9 GASTROESOPHAGEAL REFLUX DISEASE WITHOUT ESOPHAGITIS: ICD-10-CM

## 2020-05-26 DIAGNOSIS — G35 MULTIPLE SCLEROSIS (H): ICD-10-CM

## 2020-05-27 RX ORDER — DEXTROAMPHETAMINE SACCHARATE, AMPHETAMINE ASPARTATE MONOHYDRATE, DEXTROAMPHETAMINE SULFATE AND AMPHETAMINE SULFATE 5; 5; 5; 5 MG/1; MG/1; MG/1; MG/1
20 CAPSULE, EXTENDED RELEASE ORAL DAILY
Qty: 30 CAPSULE | Refills: 0 | Status: SHIPPED | OUTPATIENT
Start: 2020-05-27 | End: 2020-06-25

## 2020-05-27 RX ORDER — DEXTROAMPHETAMINE SACCHARATE, AMPHETAMINE ASPARTATE, DEXTROAMPHETAMINE SULFATE AND AMPHETAMINE SULFATE 2.5; 2.5; 2.5; 2.5 MG/1; MG/1; MG/1; MG/1
10 TABLET ORAL DAILY
Qty: 30 TABLET | Refills: 0 | Status: SHIPPED | OUTPATIENT
Start: 2020-05-27 | End: 2020-06-25

## 2020-06-02 ENCOUNTER — VIRTUAL VISIT (OUTPATIENT)
Dept: NEUROLOGY | Facility: CLINIC | Age: 35
End: 2020-06-02
Attending: PSYCHIATRY & NEUROLOGY
Payer: COMMERCIAL

## 2020-06-02 DIAGNOSIS — G35 MULTIPLE SCLEROSIS (H): Primary | ICD-10-CM

## 2020-06-02 NOTE — PROGRESS NOTES
"Darin Maier is a 35 year old male who is being evaluated via a billable video visit.      The patient has been notified of following:     \"This video visit will be conducted via a call between you and your physician/provider. We have found that certain health care needs can be provided without the need for an in-person physical exam.  This service lets us provide the care you need with a video conversation.  If a prescription is necessary we can send it directly to your pharmacy.  If lab work is needed we can place an order for that and you can then stop by our lab to have the test done at a later time.    Video visits are billed at different rates depending on your insurance coverage.  Please reach out to your insurance provider with any questions.    If during the course of the call the physician/provider feels a video visit is not appropriate, you will not be charged for this service.\"    Patient has given verbal consent for Video visit? Yes    How would you like to obtain your AVS? Oskar    Patient would like the video invitation sent by: Text to cell phone: [phone]    Will anyone else be joining your video visit? No      Notes:  Darin Maier is a 35 year old man who I follow for MS, and is seen by video for routine followup.    Darin's vision continues to slowly improve from his episode of left optic neuritis last summer, but has not returned to normal.  Color vision is normal, however.  He has mild blurriness in the left eye.  He also has dysethesias in his feet, and chronic constipation managed with senokot.  He continues to take Rebif - we had discussed switching it in light of the relapse, but he feels he has done well on it overall and that the relapse was due to life stress.  He is tolerating it well.  Recently had a skype visit with the [facility] nurse about the injections.    He suspects that he and his family had Covid-19 earlier this winter.  His mother took a cruise in January, and " "was ill with a respiratory illness on her return.  After visiting her, he, his wife both developed illness with \"terrible cough\" that lasted for weeks and then resolved.  He asks about getting antibody testing for that.    Imp:  RRMS, stable on Rebif since relapse last summer.  I spent 20 minutes with the patient, greater than 50% in counseling and coordination of care.  We discussed Covid-19 in the context of MS and interferon beta.  I would not expect Rebif to affect his susceptibility to the virus.  I told him I could order the antibody test (as he has no primary physician), but that they would call him about that as they are being triaged.  He can get interferon toxicity labs when he gets that done.    Plan:  1.  CBC, AST, ALT, covid-19 antibody  2.  RTC one year with repeat MRI at that time    Video-Visit Details    Type of service:  Video Visit    Video Start Time: 1445  Video End Time: 1505    Originating Location (pt. Location): Home    Distant Location (provider location):   [place]     Platform used for Video Visit: Shireen Phelps MD        "

## 2020-06-09 DIAGNOSIS — G35 MULTIPLE SCLEROSIS (H): ICD-10-CM

## 2020-06-09 PROCEDURE — 99000 SPECIMEN HANDLING OFFICE-LAB: CPT | Performed by: PSYCHIATRY & NEUROLOGY

## 2020-06-09 PROCEDURE — 86769 SARS-COV-2 COVID-19 ANTIBODY: CPT | Mod: 90 | Performed by: PSYCHIATRY & NEUROLOGY

## 2020-06-09 PROCEDURE — 36415 COLL VENOUS BLD VENIPUNCTURE: CPT | Performed by: PSYCHIATRY & NEUROLOGY

## 2020-06-09 NOTE — LETTER
June 13, 2020        Darin Maier  [address] [facility] [address]      COVID-19 Antibody Screen   Date Value Ref Range Status   06/09/2020 Negative  Final     Comment:     No COVID-19 antibodies detected.  Patients within 10 days of symptom onset for   COVID-19 may not produce sufficient levels of detectable antibodies.    Immunocompromised COVID-19 patients may take longer to develop antibodies.         You have tested NEGATIVE for COVID-19 antibodies. This suggests you have not had or been exposed to COVID-19. But it does not mean that for sure.     The test finds antibodies in most people 10 days after they get sick. For some people, it takes longer than 10 days for antibodies to show up. Others may never show antibodies against COVID-19, especially if they have weak immune systems.    If you have COVID-19 symptoms now, please stay home and away from others.     What is antibody testing?    This is a kind of blood test. We take a small sample of your blood, and then test it for something called  antibodies.      Your body makes antibodies to fight infection. If your blood has antibodies for a certain germ, it means you ve been infected with that germ in the past.     Sometimes, antibodies stay in your body for years after you ve had the infection. They can be there even if the germ didn t make you sick. They are a sign that your body fought off the infection.    Will this test find antibodies in everyone who s had COVID-19?    No. The test finds antibodies in most people 10 days after they get sick. For some people, it takes longer than 10 days for antibodies to show up. Others may never show antibodies against COVID-19, especially if they have weak immune systems.    What does it mean if the test finds COVID-19 antibodies?    If we find these antibodies, it suggests:     This person has had the virus.     Their body s immune system fought the virus.     We don t know if this will help protect  someone from getting COVID-19 again. Scientists are still learning about this.    What are the signs of COVID-19?    Signs of COVID-19 can appear from 2 to 14 days (up to 2 weeks) after you re infected. Some people have no symptoms or only mild symptoms. Others get very sick. The most common symptoms are:          Cough    Shortness of breath or trouble breathing  Or at least 2 of these symptoms:    Fever    Chills    Repeated shaking with chills    Muscle pain    Headache    Sore throat    Losing your sense of taste or smell    You may have other symptoms. Please contact your doctor or clinic for any symptoms that worry you.    Where can I get more information?     To learn the [facility] guidelines for staying home, please visit the [place] Department of Health website at [email or web address]./diseases/coronavirus/basics.html    To learn more about COVID-19 and how to care for yourself at home, please visit the CDC website at https://www.cdc.gov/coronavirus/2019-ncov/about/steps-when-sick.html    For more options for care at [facility], please visit our website at [email or web address]    [facility] ([facility]) COVID-19 Hotline:  [phone]

## 2020-06-12 LAB
COVID-19 SPIKE RBD ABY TITER: NORMAL
COVID-19 SPIKE RBD ABY: NEGATIVE

## 2020-06-25 ENCOUNTER — MYC REFILL (OUTPATIENT)
Dept: NEUROLOGY | Facility: CLINIC | Age: 35
End: 2020-06-25

## 2020-06-25 DIAGNOSIS — G35 MULTIPLE SCLEROSIS (H): ICD-10-CM

## 2020-06-25 RX ORDER — DEXTROAMPHETAMINE SACCHARATE, AMPHETAMINE ASPARTATE MONOHYDRATE, DEXTROAMPHETAMINE SULFATE AND AMPHETAMINE SULFATE 5; 5; 5; 5 MG/1; MG/1; MG/1; MG/1
20 CAPSULE, EXTENDED RELEASE ORAL DAILY
Qty: 30 CAPSULE | Refills: 0 | Status: SHIPPED | OUTPATIENT
Start: 2020-06-25 | End: 2020-07-28

## 2020-06-25 RX ORDER — DEXTROAMPHETAMINE SACCHARATE, AMPHETAMINE ASPARTATE, DEXTROAMPHETAMINE SULFATE AND AMPHETAMINE SULFATE 2.5; 2.5; 2.5; 2.5 MG/1; MG/1; MG/1; MG/1
10 TABLET ORAL DAILY
Qty: 30 TABLET | Refills: 0 | Status: SHIPPED | OUTPATIENT
Start: 2020-06-25 | End: 2020-07-28

## 2020-06-25 NOTE — TELEPHONE ENCOUNTER
Patient sent [employer] message requesting refill of their Adderalls; Patient was last seen in June and has no follow up appointment with Dr. Phelps; Pended rx to Dr. Phelps for signature and will send electronically to the pharmacy once signed.    Airam Cazares, MS RN Care Coordinator

## 2020-07-28 ENCOUNTER — MYC REFILL (OUTPATIENT)
Dept: NEUROLOGY | Facility: CLINIC | Age: 35
End: 2020-07-28

## 2020-07-28 ENCOUNTER — NURSE TRIAGE (OUTPATIENT)
Dept: NURSING | Facility: CLINIC | Age: 35
End: 2020-07-28

## 2020-07-28 ENCOUNTER — OFFICE VISIT - HEALTHEAST (OUTPATIENT)
Dept: FAMILY MEDICINE | Facility: CLINIC | Age: 35
End: 2020-07-28

## 2020-07-28 ENCOUNTER — VIRTUAL VISIT (OUTPATIENT)
Dept: URGENT CARE | Facility: CLINIC | Age: 35
End: 2020-07-28
Payer: COMMERCIAL

## 2020-07-28 ENCOUNTER — COMMUNICATION - HEALTHEAST (OUTPATIENT)
Dept: SCHEDULING | Facility: CLINIC | Age: 35
End: 2020-07-28

## 2020-07-28 DIAGNOSIS — G35 MULTIPLE SCLEROSIS (H): ICD-10-CM

## 2020-07-28 DIAGNOSIS — J02.9 ACUTE PHARYNGITIS, UNSPECIFIED ETIOLOGY: Primary | ICD-10-CM

## 2020-07-28 DIAGNOSIS — R07.0 THROAT PAIN: ICD-10-CM

## 2020-07-28 LAB — DEPRECATED S PYO AG THROAT QL EIA: NORMAL

## 2020-07-28 PROCEDURE — 99207 ZZC NO BILLABLE SERVICE THIS VISIT: CPT | Performed by: EMERGENCY MEDICINE

## 2020-07-28 RX ORDER — DEXTROAMPHETAMINE SACCHARATE, AMPHETAMINE ASPARTATE MONOHYDRATE, DEXTROAMPHETAMINE SULFATE AND AMPHETAMINE SULFATE 5; 5; 5; 5 MG/1; MG/1; MG/1; MG/1
20 CAPSULE, EXTENDED RELEASE ORAL DAILY
Qty: 30 CAPSULE | Refills: 0 | Status: SHIPPED | OUTPATIENT
Start: 2020-07-28 | End: 2020-08-27

## 2020-07-28 RX ORDER — DEXTROAMPHETAMINE SACCHARATE, AMPHETAMINE ASPARTATE, DEXTROAMPHETAMINE SULFATE AND AMPHETAMINE SULFATE 2.5; 2.5; 2.5; 2.5 MG/1; MG/1; MG/1; MG/1
10 TABLET ORAL DAILY
Qty: 30 TABLET | Refills: 0 | Status: SHIPPED | OUTPATIENT
Start: 2020-07-28 | End: 2020-08-27

## 2020-07-28 NOTE — TELEPHONE ENCOUNTER
Patient sent [employer] message requesting refill of their Adderall prescriptions; Patient was last seen in June and has no follow up appointment with Dr. Phelps; Pended rx to Dr. Phelps for signature and will send electronically to the pharmacy once signed; Message sent to clinic coordinators to schedule a follow up appointment with Dr. Phelps for June with MRI prior.    Airam Cazares, MS RN Care Coordinator

## 2020-07-28 NOTE — PROGRESS NOTES
HPI: Patient is a 35-year-old male with a known history of MS who developed a sore throat yesterday.  He described it as soreness on the roof of his mouth and noted some red spotty-like lesions on the roof of his mouth as well as what I believe he is describing his uvula.  He does not really have any pain posteriorly in his throat.  He has had viral and strep throat in the past and knows that type of pain and feels this is different.  His family just got back from recent travel but he has had no obvious exposure to COVID or strep.  No fever.  No other rhinorrhea, myalgias, or GI symptoms.      ROS: See HPI otherwise negative.    Allergies   Allergen Reactions     No Known Drug Allergy       Current Outpatient Medications   Medication Sig Dispense Refill     amphetamine-dextroamphetamine (ADDERALL XR) 20 MG 24 hr capsule Take 1 capsule (20 mg) by mouth daily 30 capsule 0     amphetamine-dextroamphetamine (ADDERALL) 10 MG tablet Take 1 tablet (10 mg) by mouth daily 30 tablet 0     famotidine (PEPCID) 40 MG tablet Take 1 tablet (40 mg) by mouth daily (Patient not taking: Reported on 6/2/2020) 30 tablet 0     gabapentin (NEURONTIN) 300 MG capsule TAKE 1 TO 4 CAPSULES BY MOUTH AT BEDTIME 120 capsule 11     interferon beta-1a (REBIF) 44 MCG/0.5ML injection Inject 0.5 mLs (44 mcg) Subcutaneous three times a week 36 Syringe 3     naproxen sodium (ALEVE) 220 MG tablet Take 2 tablets by mouth daily.       omeprazole (PRILOSEC) 20 MG DR capsule Take 1 capsule (20 mg) by mouth daily (Patient not taking: Reported on 6/2/2020) 90 capsule 3     omeprazole (PRILOSEC) 20 MG DR capsule daily  2     senna-docusate (SENOKOT-S/PERICOLACE) 8.6-50 MG tablet Take 1 tablet by mouth daily 30 tablet 11         PE: Physical exam reveals a 35-year-old who is in no acute distress on the phone.  He is not dyspneic sounding.        TREATMENT: None      ASSESSMENT: Oropharyngeal symptoms of uncertain etiology based on phone appointment.  Due to his  MS I think it is best that he be seen for more definitive evaluation and treatment.      DIAGNOSIS: Pharyngitis.      PLAN: I have directed Darin to the [address] number to set up an urgent care appointment today.    Time: 10 minutes

## 2020-07-28 NOTE — TELEPHONE ENCOUNTER
"Patient calling reporting symptoms of \"a little sore throat starting yesterday.\" Reporting pain is \"sore throat\" rating level \"2-3\" on 0-10 pain scale. Patient is trying salt water gargles. Denies any known contact with COVID 19.    History of Multiple Sclerosis. Reporting ongoing fatigue which he attributes to MS. Afebrile.   Taking fluids.   Denies other symptoms.    Warm transferred to Central Scheduling.     Khushi Yu RN  [facility]    COVID 19 Nurse Triage Plan/Patient Instructions    Please be aware that novel coronavirus (COVID-19) may be circulating in the community. If you develop symptoms such as fever, cough, or SOB or if you have concerns about the presence of another infection including coronavirus (COVID-19), please contact your health care provider or visit [email or web address].     Disposition/Instructions    Virtual Visit with provider recommended. Reference Visit Selection Guide.    Thank you for taking steps to prevent the spread of this virus.  o Limit your contact with others.  o Wear a simple mask to cover your cough.  o Wash your hands well and often.    Resources    [facility]: About COVID-19: [email or web address]    CDC: What to Do If You're Sick: www.cdc.gov/coronavirus/2019-ncov/about/steps-when-sick.html    CDC: Ending Home Isolation: www.cdc.gov/coronavirus/2019-ncov/hcp/disposition-in-home-patients.html     CDC: Caring for Someone: www.cdc.gov/coronavirus/2019-ncov/if-you-are-sick/care-for-someone.html     [facility]: Interim Guidance for Hospital Discharge to Home: www.health.[facility]/diseases/coronavirus/hcp/hospdischarge.pdf    [facility] clinical trials (COVID-19 research studies): clinicalaffairs.[facility]-clinical-trials     Below are the COVID-19 hotlines at the [place] Department of Health ([facility]). Interpreters are available.   o For health questions: Call 821-890-3224 or 1-662.762.4083 (7 a.m. to 7 p.m.)  o For questions about " schools and childcare: Call 065-251-7867 or 1-835.931.9344 (7 a.m. to 7 p.m.)                     Reason for Disposition    HIGH RISK patient (e.g., age > 64 years, diabetes, heart or lung disease, weak immune system)    Additional Information    Negative: SEVERE difficulty breathing (e.g., struggling for each breath, speaks in single words)    Negative: Difficult to awaken or acting confused (e.g., disoriented, slurred speech)    Negative: Bluish (or gray) lips or face now    Negative: Shock suspected (e.g., cold/pale/clammy skin, too weak to stand, low BP, rapid pulse)    Negative: Sounds like a life-threatening emergency to the triager    Negative: [1] COVID-19 exposure AND [2] no symptoms    Negative: COVID-19 and Breastfeeding, questions about    Negative: [1] Adult with possible COVID-19 symptoms AND [2] triager concerned about severity of symptoms or other causes    Negative: SEVERE or constant chest pain or pressure (Exception: mild central chest pain, present only when coughing)    Negative: MODERATE difficulty breathing (e.g., speaks in phrases, SOB even at rest, pulse 100-120)    Negative: Patient sounds very sick or weak to the triager    Negative: [1] Fever > 100.0 F (37.8 C) AND [2] bedridden (e.g., nursing home patient, CVA, chronic illness, recovering from surgery)    Negative: [1] Fever > 101 F (38.3 C) AND [2] age > 60    Negative: Fever > 103 F (39.4 C)    Negative: Chest pain or pressure    Negative: MILD difficulty breathing (e.g., minimal/no SOB at rest, SOB with walking, pulse <100)    Protocols used: CORONAVIRUS (COVID-19) DIAGNOSED OR [id number] 5.16.20

## 2020-07-29 LAB — GROUP A STREP BY PCR: NORMAL

## 2020-07-30 ENCOUNTER — TELEPHONE (OUTPATIENT)
Dept: NEUROLOGY | Facility: CLINIC | Age: 35
End: 2020-07-30

## 2020-07-30 ENCOUNTER — AMBULATORY - HEALTHEAST (OUTPATIENT)
Dept: FAMILY MEDICINE | Facility: CLINIC | Age: 35
End: 2020-07-30

## 2020-07-30 DIAGNOSIS — R07.0 THROAT PAIN: ICD-10-CM

## 2020-07-30 NOTE — TELEPHONE ENCOUNTER
LV for patient in attempt to schedule his MRI prior to his appointment with . [phone] was left for patient to call back,.

## 2020-08-01 ENCOUNTER — COMMUNICATION - HEALTHEAST (OUTPATIENT)
Dept: SCHEDULING | Facility: CLINIC | Age: 35
End: 2020-08-01

## 2020-08-27 ENCOUNTER — MYC REFILL (OUTPATIENT)
Dept: NEUROLOGY | Facility: CLINIC | Age: 35
End: 2020-08-27

## 2020-08-27 DIAGNOSIS — G35 MULTIPLE SCLEROSIS (H): ICD-10-CM

## 2020-08-27 RX ORDER — DEXTROAMPHETAMINE SACCHARATE, AMPHETAMINE ASPARTATE MONOHYDRATE, DEXTROAMPHETAMINE SULFATE AND AMPHETAMINE SULFATE 5; 5; 5; 5 MG/1; MG/1; MG/1; MG/1
20 CAPSULE, EXTENDED RELEASE ORAL DAILY
Qty: 30 CAPSULE | Refills: 0 | Status: SHIPPED | OUTPATIENT
Start: 2020-08-27 | End: 2020-09-23

## 2020-08-27 RX ORDER — DEXTROAMPHETAMINE SACCHARATE, AMPHETAMINE ASPARTATE, DEXTROAMPHETAMINE SULFATE AND AMPHETAMINE SULFATE 2.5; 2.5; 2.5; 2.5 MG/1; MG/1; MG/1; MG/1
10 TABLET ORAL DAILY
Qty: 30 TABLET | Refills: 0 | Status: SHIPPED | OUTPATIENT
Start: 2020-08-27 | End: 2020-09-23

## 2020-08-27 NOTE — TELEPHONE ENCOUNTER
Patient sent [employer] message requesting refill of their Adderall; Patient was last seen in June and has no follow up appointment with Dr. Phelps; Pended rx to Dr. Phelps for signature and will send electronically to the pharmacy once signed.    Airam Cazares, MS RN Care Coordinator

## 2020-09-23 ENCOUNTER — MYC REFILL (OUTPATIENT)
Dept: NEUROLOGY | Facility: CLINIC | Age: 35
End: 2020-09-23

## 2020-09-23 DIAGNOSIS — G35 MULTIPLE SCLEROSIS (H): ICD-10-CM

## 2020-09-24 RX ORDER — DEXTROAMPHETAMINE SACCHARATE, AMPHETAMINE ASPARTATE MONOHYDRATE, DEXTROAMPHETAMINE SULFATE AND AMPHETAMINE SULFATE 5; 5; 5; 5 MG/1; MG/1; MG/1; MG/1
20 CAPSULE, EXTENDED RELEASE ORAL DAILY
Qty: 30 CAPSULE | Refills: 0 | Status: SHIPPED | OUTPATIENT
Start: 2020-09-24 | End: 2020-10-23

## 2020-09-24 RX ORDER — DEXTROAMPHETAMINE SACCHARATE, AMPHETAMINE ASPARTATE, DEXTROAMPHETAMINE SULFATE AND AMPHETAMINE SULFATE 2.5; 2.5; 2.5; 2.5 MG/1; MG/1; MG/1; MG/1
10 TABLET ORAL DAILY
Qty: 30 TABLET | Refills: 0 | Status: SHIPPED | OUTPATIENT
Start: 2020-09-24 | End: 2020-10-23

## 2020-10-16 ENCOUNTER — COMMUNICATION - HEALTHEAST (OUTPATIENT)
Dept: SCHEDULING | Facility: CLINIC | Age: 35
End: 2020-10-16

## 2020-10-23 ENCOUNTER — MYC REFILL (OUTPATIENT)
Dept: NEUROLOGY | Facility: CLINIC | Age: 35
End: 2020-10-23

## 2020-10-23 DIAGNOSIS — G35 MULTIPLE SCLEROSIS (H): ICD-10-CM

## 2020-10-23 RX ORDER — DEXTROAMPHETAMINE SACCHARATE, AMPHETAMINE ASPARTATE MONOHYDRATE, DEXTROAMPHETAMINE SULFATE AND AMPHETAMINE SULFATE 5; 5; 5; 5 MG/1; MG/1; MG/1; MG/1
20 CAPSULE, EXTENDED RELEASE ORAL DAILY
Qty: 30 CAPSULE | Refills: 0 | Status: SHIPPED | OUTPATIENT
Start: 2020-10-23 | End: 2020-11-17

## 2020-10-23 RX ORDER — DEXTROAMPHETAMINE SACCHARATE, AMPHETAMINE ASPARTATE, DEXTROAMPHETAMINE SULFATE AND AMPHETAMINE SULFATE 2.5; 2.5; 2.5; 2.5 MG/1; MG/1; MG/1; MG/1
10 TABLET ORAL DAILY
Qty: 30 TABLET | Refills: 0 | Status: SHIPPED | OUTPATIENT
Start: 2020-10-23 | End: 2020-11-17

## 2020-10-23 NOTE — TELEPHONE ENCOUNTER
Patient sent MyChart request for refill of their Adderall and Adderall XR; Patient was last seen in June and has no follow up appointment scheduled yet. Pended rx to Dr. Phelps for signature and will send electronically to the pharmacy once signed.    Teresa Chinchilla RN

## 2020-11-17 ENCOUNTER — MYC REFILL (OUTPATIENT)
Dept: NEUROLOGY | Facility: CLINIC | Age: 35
End: 2020-11-17

## 2020-11-17 DIAGNOSIS — G35 MULTIPLE SCLEROSIS (H): ICD-10-CM

## 2020-11-17 DIAGNOSIS — K21.9 GASTROESOPHAGEAL REFLUX DISEASE WITHOUT ESOPHAGITIS: ICD-10-CM

## 2020-11-18 RX ORDER — DEXTROAMPHETAMINE SACCHARATE, AMPHETAMINE ASPARTATE, DEXTROAMPHETAMINE SULFATE AND AMPHETAMINE SULFATE 2.5; 2.5; 2.5; 2.5 MG/1; MG/1; MG/1; MG/1
10 TABLET ORAL DAILY
Qty: 30 TABLET | Refills: 0 | Status: SHIPPED | OUTPATIENT
Start: 2020-11-18 | End: 2020-12-29

## 2020-11-18 RX ORDER — DEXTROAMPHETAMINE SACCHARATE, AMPHETAMINE ASPARTATE MONOHYDRATE, DEXTROAMPHETAMINE SULFATE AND AMPHETAMINE SULFATE 5; 5; 5; 5 MG/1; MG/1; MG/1; MG/1
20 CAPSULE, EXTENDED RELEASE ORAL DAILY
Qty: 30 CAPSULE | Refills: 0 | Status: SHIPPED | OUTPATIENT
Start: 2020-11-18 | End: 2020-12-29

## 2020-11-18 NOTE — TELEPHONE ENCOUNTER
Patient sent [employer] message requesting refill of their Adderall and omeprazole; Patient was last seen in June and has no follow up appointment with Dr. Phelps; Pended rx to Dr. Phelps for signature and will send electronically to the pharmacy once signed.    Airam Cazares MS RN Care Coordinator

## 2020-12-06 ENCOUNTER — HEALTH MAINTENANCE LETTER (OUTPATIENT)
Age: 35
End: 2020-12-06

## 2020-12-28 DIAGNOSIS — G35 MULTIPLE SCLEROSIS (H): ICD-10-CM

## 2020-12-29 RX ORDER — DEXTROAMPHETAMINE SACCHARATE, AMPHETAMINE ASPARTATE MONOHYDRATE, DEXTROAMPHETAMINE SULFATE AND AMPHETAMINE SULFATE 5; 5; 5; 5 MG/1; MG/1; MG/1; MG/1
20 CAPSULE, EXTENDED RELEASE ORAL DAILY
Qty: 30 CAPSULE | Refills: 0 | Status: SHIPPED | OUTPATIENT
Start: 2020-12-29 | End: 2021-01-25

## 2020-12-29 RX ORDER — DEXTROAMPHETAMINE SACCHARATE, AMPHETAMINE ASPARTATE, DEXTROAMPHETAMINE SULFATE AND AMPHETAMINE SULFATE 2.5; 2.5; 2.5; 2.5 MG/1; MG/1; MG/1; MG/1
10 TABLET ORAL DAILY
Qty: 30 TABLET | Refills: 0 | Status: SHIPPED | OUTPATIENT
Start: 2020-12-29 | End: 2021-01-25

## 2020-12-29 NOTE — TELEPHONE ENCOUNTER
Patient sent [employer] message requesting refill of their Adderalls; Patient was last seen in June and has no follow up appointment with Dr. Phelps; Pended rx to Elizabeth Ramirez, on Dr. Phelps's behalf, for signature and will send electronically to the pharmacy once signed.    Airam Cazares MS RN Care Coordinator

## 2021-01-15 DIAGNOSIS — G35 MULTIPLE SCLEROSIS (H): ICD-10-CM

## 2021-01-15 RX ORDER — INTERFERON BETA-1A 44 UG/.5ML
INJECTION, SOLUTION SUBCUTANEOUS
Qty: 12 SYRINGE | Refills: 3 | Status: SHIPPED | OUTPATIENT
Start: 2021-01-15 | End: 2021-05-07

## 2021-01-15 NOTE — TELEPHONE ENCOUNTER
Received refill request for Rebif from [facility]; Patient was last seen in June and has no follow up appointment scheduled. Refilled per MS refill protocol.    Teresa Chinchilla RN

## 2021-01-17 DIAGNOSIS — G35 MULTIPLE SCLEROSIS (H): ICD-10-CM

## 2021-01-17 DIAGNOSIS — R52 PAIN: ICD-10-CM

## 2021-01-18 RX ORDER — GABAPENTIN 300 MG/1
CAPSULE ORAL
Qty: 120 CAPSULE | Refills: 11 | Status: SHIPPED | OUTPATIENT
Start: 2021-01-18 | End: 2022-01-24

## 2021-01-18 NOTE — TELEPHONE ENCOUNTER
Received refill request for gabapentin from [facility]; Patient was last seen in June and has no follow up appointment with Dr. Phelps; Refilled per MS refill protocol.    Airam Cazares MS RN Care Coordinator

## 2021-01-25 ENCOUNTER — MYC REFILL (OUTPATIENT)
Dept: NEUROLOGY | Facility: CLINIC | Age: 36
End: 2021-01-25

## 2021-01-25 DIAGNOSIS — G35 MULTIPLE SCLEROSIS (H): ICD-10-CM

## 2021-01-25 RX ORDER — DEXTROAMPHETAMINE SACCHARATE, AMPHETAMINE ASPARTATE, DEXTROAMPHETAMINE SULFATE AND AMPHETAMINE SULFATE 2.5; 2.5; 2.5; 2.5 MG/1; MG/1; MG/1; MG/1
10 TABLET ORAL DAILY
Qty: 30 TABLET | Refills: 0 | Status: SHIPPED | OUTPATIENT
Start: 2021-01-25 | End: 2021-01-27

## 2021-01-25 RX ORDER — DEXTROAMPHETAMINE SACCHARATE, AMPHETAMINE ASPARTATE MONOHYDRATE, DEXTROAMPHETAMINE SULFATE AND AMPHETAMINE SULFATE 5; 5; 5; 5 MG/1; MG/1; MG/1; MG/1
20 CAPSULE, EXTENDED RELEASE ORAL DAILY
Qty: 30 CAPSULE | Refills: 0 | Status: SHIPPED | OUTPATIENT
Start: 2021-01-25 | End: 2021-01-27

## 2021-01-25 NOTE — TELEPHONE ENCOUNTER
Patient sent [employer] message asking for a refill of his Adderall prescriptions; Prescriptions inadvertently signed by myself under Dr. Phelps's name, however, these were not transmitted to the pharmacy; Prescriptions re-pended to Dr. Phelps to sign electronically for pharmacy transmission.    Airam Cazares, MS RN Care Coordinator

## 2021-01-27 RX ORDER — DEXTROAMPHETAMINE SACCHARATE, AMPHETAMINE ASPARTATE MONOHYDRATE, DEXTROAMPHETAMINE SULFATE AND AMPHETAMINE SULFATE 5; 5; 5; 5 MG/1; MG/1; MG/1; MG/1
20 CAPSULE, EXTENDED RELEASE ORAL DAILY
Qty: 30 CAPSULE | Refills: 0 | Status: SHIPPED | OUTPATIENT
Start: 2021-01-27 | End: 2021-02-23

## 2021-01-27 RX ORDER — DEXTROAMPHETAMINE SACCHARATE, AMPHETAMINE ASPARTATE, DEXTROAMPHETAMINE SULFATE AND AMPHETAMINE SULFATE 2.5; 2.5; 2.5; 2.5 MG/1; MG/1; MG/1; MG/1
10 TABLET ORAL DAILY
Qty: 30 TABLET | Refills: 0 | Status: SHIPPED | OUTPATIENT
Start: 2021-01-27 | End: 2021-02-23

## 2021-02-23 ENCOUNTER — MYC REFILL (OUTPATIENT)
Dept: NEUROLOGY | Facility: CLINIC | Age: 36
End: 2021-02-23

## 2021-02-23 DIAGNOSIS — G35 MULTIPLE SCLEROSIS (H): ICD-10-CM

## 2021-02-23 RX ORDER — DEXTROAMPHETAMINE SACCHARATE, AMPHETAMINE ASPARTATE MONOHYDRATE, DEXTROAMPHETAMINE SULFATE AND AMPHETAMINE SULFATE 5; 5; 5; 5 MG/1; MG/1; MG/1; MG/1
20 CAPSULE, EXTENDED RELEASE ORAL DAILY
Qty: 30 CAPSULE | Refills: 0 | Status: SHIPPED | OUTPATIENT
Start: 2021-02-23 | End: 2021-03-24

## 2021-02-23 RX ORDER — DEXTROAMPHETAMINE SACCHARATE, AMPHETAMINE ASPARTATE, DEXTROAMPHETAMINE SULFATE AND AMPHETAMINE SULFATE 2.5; 2.5; 2.5; 2.5 MG/1; MG/1; MG/1; MG/1
10 TABLET ORAL DAILY
Qty: 30 TABLET | Refills: 0 | Status: SHIPPED | OUTPATIENT
Start: 2021-02-23 | End: 2021-03-24

## 2021-02-23 NOTE — TELEPHONE ENCOUNTER
Patient sent [employer] message requesting refill of their Adderall prescriptions; Patient was last seen in June and has no follow up appointment with Dr. Phelps; Pended rx to Dr. Phelps for signature and will send electronically to the pharmacy once signed.    Airam Cazares, MS RN Care Coordinator

## 2021-03-24 ENCOUNTER — MYC REFILL (OUTPATIENT)
Dept: NEUROLOGY | Facility: CLINIC | Age: 36
End: 2021-03-24

## 2021-03-24 DIAGNOSIS — G35 MULTIPLE SCLEROSIS (H): ICD-10-CM

## 2021-03-24 RX ORDER — DEXTROAMPHETAMINE SACCHARATE, AMPHETAMINE ASPARTATE, DEXTROAMPHETAMINE SULFATE AND AMPHETAMINE SULFATE 2.5; 2.5; 2.5; 2.5 MG/1; MG/1; MG/1; MG/1
10 TABLET ORAL DAILY
Qty: 30 TABLET | Refills: 0 | Status: SHIPPED | OUTPATIENT
Start: 2021-03-24 | End: 2021-04-27

## 2021-03-24 RX ORDER — DEXTROAMPHETAMINE SACCHARATE, AMPHETAMINE ASPARTATE MONOHYDRATE, DEXTROAMPHETAMINE SULFATE AND AMPHETAMINE SULFATE 5; 5; 5; 5 MG/1; MG/1; MG/1; MG/1
20 CAPSULE, EXTENDED RELEASE ORAL DAILY
Qty: 30 CAPSULE | Refills: 0 | Status: SHIPPED | OUTPATIENT
Start: 2021-03-24 | End: 2021-04-27

## 2021-03-24 NOTE — TELEPHONE ENCOUNTER
Patient requesting refill of their adderall and adderall XR; Patient was last seen in June and has no follow up appointment scheduled with Dr. Phelps. Pended rx to Dr. Phelps for signature and will send electronically to the pharmacy once signed.    Teresa Chinchilla RN

## 2021-04-27 ENCOUNTER — MYC REFILL (OUTPATIENT)
Dept: NEUROLOGY | Facility: CLINIC | Age: 36
End: 2021-04-27

## 2021-04-27 DIAGNOSIS — G35 MULTIPLE SCLEROSIS (H): ICD-10-CM

## 2021-04-27 NOTE — TELEPHONE ENCOUNTER
Patient sent [employer] message requesting refill of their Adderall prescriptions; Patient was last seen in June and has no follow up appointment with Dr. Phelps; Pended rx to Dr. Phelps for signature and will send electronically to the pharmacy once signed; I will ask our medical assistant, Albertina, to call the patient to schedule a yearly follow up appointment with Dr. Phelps.    Airam Cazares MS RN Care Coordinator

## 2021-04-29 RX ORDER — DEXTROAMPHETAMINE SACCHARATE, AMPHETAMINE ASPARTATE MONOHYDRATE, DEXTROAMPHETAMINE SULFATE AND AMPHETAMINE SULFATE 5; 5; 5; 5 MG/1; MG/1; MG/1; MG/1
20 CAPSULE, EXTENDED RELEASE ORAL DAILY
Qty: 30 CAPSULE | Refills: 0 | Status: SHIPPED | OUTPATIENT
Start: 2021-04-29 | End: 2021-05-27

## 2021-04-29 RX ORDER — DEXTROAMPHETAMINE SACCHARATE, AMPHETAMINE ASPARTATE, DEXTROAMPHETAMINE SULFATE AND AMPHETAMINE SULFATE 2.5; 2.5; 2.5; 2.5 MG/1; MG/1; MG/1; MG/1
10 TABLET ORAL DAILY
Qty: 30 TABLET | Refills: 0 | Status: SHIPPED | OUTPATIENT
Start: 2021-04-29 | End: 2021-05-27

## 2021-05-07 DIAGNOSIS — G35 MULTIPLE SCLEROSIS (H): ICD-10-CM

## 2021-05-07 RX ORDER — INTERFERON BETA-1A 44 UG/.5ML
INJECTION, SOLUTION SUBCUTANEOUS
Qty: 6 ML | Refills: 3 | Status: SHIPPED | OUTPATIENT
Start: 2021-05-07 | End: 2021-08-24

## 2021-05-07 NOTE — TELEPHONE ENCOUNTER
Received refill request for Rebif from [facility]; Patient was last seen in June and has no follow up appointment scheduled. Refilled per [place] refill protocol and message sent to coordinator to schedule appointment.    Teresa Chinchilla RN

## 2021-05-27 ENCOUNTER — MYC REFILL (OUTPATIENT)
Dept: NEUROLOGY | Facility: CLINIC | Age: 36
End: 2021-05-27

## 2021-05-27 VITALS
SYSTOLIC BLOOD PRESSURE: 133 MMHG | TEMPERATURE: 98.5 F | OXYGEN SATURATION: 98 % | RESPIRATION RATE: 16 BRPM | DIASTOLIC BLOOD PRESSURE: 87 MMHG | HEART RATE: 94 BPM

## 2021-05-27 DIAGNOSIS — G35 MULTIPLE SCLEROSIS (H): ICD-10-CM

## 2021-05-27 RX ORDER — DEXTROAMPHETAMINE SACCHARATE, AMPHETAMINE ASPARTATE, DEXTROAMPHETAMINE SULFATE AND AMPHETAMINE SULFATE 2.5; 2.5; 2.5; 2.5 MG/1; MG/1; MG/1; MG/1
10 TABLET ORAL DAILY
Qty: 30 TABLET | Refills: 0 | Status: SHIPPED | OUTPATIENT
Start: 2021-05-27 | End: 2021-06-28

## 2021-05-27 RX ORDER — DEXTROAMPHETAMINE SACCHARATE, AMPHETAMINE ASPARTATE MONOHYDRATE, DEXTROAMPHETAMINE SULFATE AND AMPHETAMINE SULFATE 5; 5; 5; 5 MG/1; MG/1; MG/1; MG/1
20 CAPSULE, EXTENDED RELEASE ORAL DAILY
Qty: 30 CAPSULE | Refills: 0 | Status: SHIPPED | OUTPATIENT
Start: 2021-05-27 | End: 2021-06-28

## 2021-05-27 NOTE — TELEPHONE ENCOUNTER
Patient sent [employer] message requesting refill of their Adderalls; Patient was last seen in June and has no follow up appointment with Dr. Phelps; Pended rx to Dr. Phelps for signature and will send electronically to the pharmacy once signed; I will send him a letter advising him to schedule a follow up appointment with Dr. Phelps.    Airam Cazares, MS RN Care Coordinator

## 2021-06-10 NOTE — TELEPHONE ENCOUNTER
"Patient calling reporting symptoms of \"a little sore throat starting yesterday.\" Reporting pain is \"sore throat\" rating level \"2-3\" on 0-10 pain scale. Patient is trying salt water gargles. Denies any known contact with COVID 19.    History of Multiple Sclerosis. Reporting ongoing fatigue which he attributes to MS. Afebrile.   Taking fluids.   Denies other symptoms.    Warm transferred to Central Scheduling.     Khushi Yu RN  [facility]    COVID 19 Nurse Triage Plan/Patient Instructions    Please be aware that novel coronavirus (COVID-19) may be circulating in the community. If you develop symptoms such as fever, cough, or SOB or if you have concerns about the presence of another infection including coronavirus (COVID-19), please contact your health care provider or visit [email or web address].     Disposition/Instructions    Virtual Visit with provider recommended. Reference Visit Selection Guide.    Thank you for taking steps to prevent the spread of this virus.  o Limit your contact with others.  o Wear a simple mask to cover your cough.  o Wash your hands well and often.    Resources    [facility]: About COVID-19: [email or web address]    CDC: What to Do If You're Sick: www.cdc.gov/coronavirus/2019-ncov/about/steps-when-sick.html    CDC: Ending Home Isolation: www.cdc.gov/coronavirus/2019-ncov/hcp/disposition-in-home-patients.html     CDC: Caring for Someone: www.cdc.gov/coronavirus/2019-ncov/if-you-are-sick/care-for-someone.html     [facility]: Interim Guidance for Hospital Discharge to Home: www.health.[facility].[place]/diseases/coronavirus/hcp/hospdischarge.pdf    [facility] clinical trials (COVID-19 research studies): clinicalaffairs.[facility]-clinical-trials     Below are the COVID-19 hotlines at the [place] Department of Health ([facility]). Interpreters are available.   o For health questions: Call 999-105-5833 or 1-724.517.3524 (7 a.m. to 7 p.m.)  o For questions about " schools and childcare: Call 045-648-9523 or 1-361.365.1111 (7 a.m. to 7 p.m.)        Reason for Disposition    HIGH RISK patient (e.g., age > 64 years, diabetes, heart or lung disease, weak immune system)    Additional Information    Negative: SEVERE difficulty breathing (e.g., struggling for each breath, speaks in single words)    Negative: Difficult to awaken or acting confused (e.g., disoriented, slurred speech)    Negative: Bluish (or gray) lips or face now    Negative: Shock suspected (e.g., cold/pale/clammy skin, too weak to stand, low BP, rapid pulse)    Negative: Sounds like a life-threatening emergency to the triager    Negative: SEVERE or constant chest pain or pressure (Exception: mild central chest pain, present only when coughing)    Negative: MODERATE difficulty breathing (e.g., speaks in phrases, SOB even at rest, pulse 100-120)    Negative: Patient sounds very sick or weak to the triager    Negative: MILD difficulty breathing (e.g., minimal/no SOB at rest, SOB with walking, pulse <100)    Negative: Chest pain or pressure    Negative: Fever > 103 F (39.4 C)    Negative: [1] Fever > 101 F (38.3 C) AND [2] age > 60    Negative: [1] Fever > 100.0 F (37.8 C) AND [2] bedridden (e.g., nursing home patient, CVA, chronic illness, recovering from surgery)    Protocols used: CORONAVIRUS (COVID-19) DIAGNOSED OR [id number]-F-DS 5.16.20

## 2021-06-10 NOTE — PATIENT INSTRUCTIONS - HE
"Rapid Strep test was negative.     If overnight test returns positive, we will call tomorrow and call in antibiotic prescription.    No notification means that overnight test returned negative.    Symptoms are likely due to viral infection that will resolve on its own in 1-2 weeks.    May continue with symptomatic treatments including:  - Salt water gargles  - Warm beverages such as a non-caffeinated tea with honey  - Throat drops  - Ibuprofen or acetaminophen for pain or fever relief    If fevers not coming down with acetaminophen or ibuprofen, shortness of breath, not tolerating oral liquid intake, drooling, or stiff neck, return for re-evaluation immediately. Otherwise, if no improvement in the next week, follow up with your primary care provider.    Discharge Instructions for COVID-19 Patients  You have--or may have--COVID-19. Please follow the instructions listed below.   If you have a weakened immune system, discuss with your doctor any other actions you need to take.  How can I protect others?  If you have symptoms (fever, cough, body aches or trouble breathing):    Stay home and away from others (self-isolate) until:  ? At least 10 days have passed since your symptoms started. And   ? You've had no fever--and no medicine that reduces fever--for 3 full days (72 hours). And   ? Your other symptoms have resolved (gotten better).  If you don't show symptoms, but testing showed that you have COVID-19:    Stay home and away from others (self-isolate) until at least 10 days have passed since the date of your first positive COVID-19 test.  During this time    Stay in your own room, even for meals. Use your own bathroom if you can.    Stay away from others in your home. No hugging, kissing or shaking hands. No visitors.    Don't go to work, school or anywhere else.    Clean \"high touch\" surfaces often (doorknobs, counters, handles). Use household cleaning spray or wipes. You'll find a full list of  on the EPA " website: www.epa.gov/pesticide-registration/list-n-disinfectants-use-against-sars-cov-2.    Cover your mouth and nose with a mask, tissue or wash cloth to avoid spreading germs.    Wash your hands and face often. Use soap and water.    Caregivers in these groups are at risk for severe illness due to COVID-19:  ? People 65 years and older  ? People who live in a nursing home or long-term care facility  ? People with chronic disease (lung, heart, cancer, diabetes, kidney, liver, immunologic)  ? People who have a weakened immune system, including those who:    Are in cancer treatment    Take medicine that weakens the immune system, such as corticosteroids    Had a bone marrow or organ transplant    Have an immune deficiency    Have poorly controlled HIV or AIDS    Are obese (body mass index of 40 or higher)    Smoke regularly    Caregivers should wear gloves while washing dishes, handling laundry and cleaning bedrooms and bathrooms.    Use caution when washing and drying laundry: Don't shake dirty laundry and use the warmest water setting that you can.    For more tips on managing your health at home, go to www.cdc.gov/coronavirus/2019-ncov/downloads/10Things.pdf.  How can I take care of myself at home?  1. Get lots of rest. Drink extra fluids (unless a doctor has told you not to).  2. Take Tylenol (acetaminophen) for fever or pain. If you have liver or kidney problems, ask your family doctor if it's okay to take Tylenol.     Adults can take either:  ? 650 mg (two 325 mg pills) every 4 to 6 hours, or   ? 1,000 mg (two 500 mg pills) every 8 hours as needed.  ? Note: Don't take more than 3,000 mg in one day. Acetaminophen is found in many medicines (both prescribed and over-the-counter medicines). Read all labels to be sure you don't take too much.  For children, check the Tylenol bottle for the right dose. The dose is based on the child's age or weight.  3. If you have other health problems (like cancer, heart failure,  an organ transplant or severe kidney disease): Call your specialty clinic if you don't feel better in the next 2 days.  4. Know when to call 911. Emergency warning signs include:  ? Trouble breathing or shortness of breath  ? Pain or pressure in the chest that doesn't go away  ? Feeling confused like you haven't felt before, or not being able to wake up  ? Bluish-colored lips or face  5. Your doctor may have prescribed a blood thinner medicine. Follow their instructions.  Where can I get more information?    [facility] - About COVID-19:   [email or web address]    CDC - What to Do If You're Sick: www.cdc.gov/coronavirus/2019-ncov/about/steps-when-sick.html    CDC - Ending Home Isolation: www.cdc.gov/coronavirus/2019-ncov/hcp/disposition-in-home-patients.html    CDC - Caring for Someone: www.cdc.gov/coronavirus/2019-ncov/if-you-are-sick/care-for-someone.html    [facility] - Interim Guidance for Hospital Discharge to Home: www.[facility].[place]./diseases/coronavirus/hcp/hospdischarge.pdf    [facility] clinical trials (COVID-19 research studies): clinicalaffairs.[facility]-clinical-trials    Below are the COVID-19 hotlines at the [place] Department of Health ([facility]). Interpreters are available.  ? For health questions: Call 527-431-8909 or 1-844.196.8575 (7 a.m. to 7 p.m.)  ? For questions about schools and childcare: Call 759-851-0198 or 1-953.618.3334 (7 a.m. to 7 p.m.)    For informational purposes only. Not to replace the advice of your health care provider. Clinically reviewed by the Infection Prevention Team.Copyright   2020 [employer]. All rights reserved. [employer] [id number] - 06/20.

## 2021-06-10 NOTE — PROGRESS NOTES
Assessment & Plan:       1. Throat pain  Rapid Strep A Screen-Throat swab    Group A Strep, RNA Direct Detection, Throat    Symptomatic COVID-19 Virus (CORONAVIRUS) PCR      Medical Decision Making  Patient presents to clinic with acute onset sore throat.  His rapid strep test is negative at this time.  He was noted to have erythema on the left soft palate but no significant tonsillar swelling, no fevers, no anterior cervical lymphadenopathy.  There is still ongoing concerns for possible COVID-19 versus other viral pharyngitis versus mild trauma to the throat from swallowing hot food.  Placed a COVID-19 curbside test order.  Provided education materials and discussed prevention of spreading illness.  Patient will stay at home and self isolate.  Continue with conservative management of salt water gargles, throat lozenges, warm tea with honey, and acetaminophen.  Discussed signs of worsening symptoms and when to follow-up with PCP if no symptom improvement.    Patient had symptoms possibly consistent with COVID-19.  Thus, protective precautions were taken including appropriate facemask, face shield, gown, and gloves.    Patient Instructions   Rapid Strep test was negative.     If overnight test returns positive, we will call tomorrow and call in antibiotic prescription.    No notification means that overnight test returned negative.    Symptoms are likely due to viral infection that will resolve on its own in 1-2 weeks.    May continue with symptomatic treatments including:  - Salt water gargles  - Warm beverages such as a non-caffeinated tea with honey  - Throat drops  - Ibuprofen or acetaminophen for pain or fever relief    If fevers not coming down with acetaminophen or ibuprofen, shortness of breath, not tolerating oral liquid intake, drooling, or stiff neck, return for re-evaluation immediately. Otherwise, if no improvement in the next week, follow up with your primary care provider.    Discharge Instructions  "for COVID-19 Patients  You have--or may have--COVID-19. Please follow the instructions listed below.   If you have a weakened immune system, discuss with your doctor any other actions you need to take.  How can I protect others?  If you have symptoms (fever, cough, body aches or trouble breathing):    Stay home and away from others (self-isolate) until:  ? At least 10 days have passed since your symptoms started. And   ? You've had no fever--and no medicine that reduces fever--for 3 full days (72 hours). And   ? Your other symptoms have resolved (gotten better).  If you don't show symptoms, but testing showed that you have COVID-19:    Stay home and away from others (self-isolate) until at least 10 days have passed since the date of your first positive COVID-19 test.  During this time    Stay in your own room, even for meals. Use your own bathroom if you can.    Stay away from others in your home. No hugging, kissing or shaking hands. No visitors.    Don't go to work, school or anywhere else.    Clean \"high touch\" surfaces often (doorknobs, counters, handles). Use household cleaning spray or wipes. You'll find a full list of  on the EPA website: www.epa.gov/pesticide-registration/list-n-disinfectants-use-against-sars-cov-2.    Cover your mouth and nose with a mask, tissue or wash cloth to avoid spreading germs.    Wash your hands and face often. Use soap and water.    Caregivers in these groups are at risk for severe illness due to COVID-19:  ? People 65 years and older  ? People who live in a nursing home or long-term care facility  ? People with chronic disease (lung, heart, cancer, diabetes, kidney, liver, immunologic)  ? People who have a weakened immune system, including those who:    Are in cancer treatment    Take medicine that weakens the immune system, such as corticosteroids    Had a bone marrow or organ transplant    Have an immune deficiency    Have poorly controlled HIV or AIDS    Are obese " (body mass index of 40 or higher)    Smoke regularly    Caregivers should wear gloves while washing dishes, handling laundry and cleaning bedrooms and bathrooms.    Use caution when washing and drying laundry: Don't shake dirty laundry and use the warmest water setting that you can.    For more tips on managing your health at home, go to www.cdc.gov/coronavirus/2019-ncov/downloads/10Things.pdf.  How can I take care of myself at home?  1. Get lots of rest. Drink extra fluids (unless a doctor has told you not to).  2. Take Tylenol (acetaminophen) for fever or pain. If you have liver or kidney problems, ask your family doctor if it's okay to take Tylenol.     Adults can take either:  ? 650 mg (two 325 mg pills) every 4 to 6 hours, or   ? 1,000 mg (two 500 mg pills) every 8 hours as needed.  ? Note: Don't take more than 3,000 mg in one day. Acetaminophen is found in many medicines (both prescribed and over-the-counter medicines). Read all labels to be sure you don't take too much.  For children, check the Tylenol bottle for the right dose. The dose is based on the child's age or weight.  3. If you have other health problems (like cancer, heart failure, an organ transplant or severe kidney disease): Call your specialty clinic if you don't feel better in the next 2 days.  4. Know when to call 911. Emergency warning signs include:  ? Trouble breathing or shortness of breath  ? Pain or pressure in the chest that doesn't go away  ? Feeling confused like you haven't felt before, or not being able to wake up  ? Bluish-colored lips or face  5. Your doctor may have prescribed a blood thinner medicine. Follow their instructions.  Where can I get more information?     [facility] - About COVID-19:   [email or web address]    CDC - What to Do If You're Sick: www.cdc.gov/coronavirus/2019-ncov/about/steps-when-sick.html    CDC - Ending Home Isolation:  www.cdc.gov/coronavirus/2019-ncov/hcp/disposition-in-home-patients.html    CDC - Caring for Someone: www.cdc.gov/coronavirus/2019-ncov/if-you-are-sick/care-for-someone.html    [facility] - Interim Guidance for Hospital Discharge to Home: www.health.[facility].mn.us/diseases/coronavirus/hcp/hospdischarge.pdf    [facility] clinical trials (COVID-19 research studies): clinicalaffairs.[facility]-clinical-trials    Below are the COVID-19 hotlines at the [place] Department of [facility] ([facility]). Interpreters are available.  ? For health questions: Call 498-032-1476 or 1-258.350.3472 (7 a.m. to 7 p.m.)  ? For questions about schools and childcare: Call 942-000-9190 or 1-841.936.7715 (7 a.m. to 7 p.m.)    For informational purposes only. Not to replace the advice of your health care provider. Clinically reviewed by the Infection Prevention Team.Copyright   2020 [facility]. All rights reserved. [employer] [id number] - 06/20.            Subjective:       Darin Maier is a 35 y.o. male with history of multiple sclerosis, here for evaluation of throat pain.  Onset of symptoms was last night.  Associated symptoms include red spots on the back of the soft palate.  Patient otherwise denies fevers, cough, shortness of breath.  He has had no known contacts with anyone having strep pharyngitis or COVID-19.  He has not taken any medications to help with symptoms.    The following portions of the patient's history were reviewed and updated as appropriate: allergies, current medications and problem list.    Review of Systems  Pertinent items are noted in HPI.     Allergies  No Known Allergies    No family history on file.    Social History     Socioeconomic History     Marital status:      Spouse name: None     Number of children: None     Years of education: None     Highest education level: None   Occupational History     None   Social Needs     Financial resource strain: None     Food insecurity     Worry: None      Inability: None     Transportation needs     Medical: None     Non-medical: None   Tobacco Use     Smoking status: Never Smoker     Smokeless tobacco: Former User   Substance and Sexual Activity     Alcohol use: None     Drug use: None     Sexual activity: None   Lifestyle     Physical activity     Days per week: None     Minutes per session: None     Stress: None   Relationships     Social connections     Talks on phone: None     Gets together: None     Attends Restoration service: None     Active member of club or organization: None     Attends meetings of clubs or organizations: None     Relationship status: None     Intimate partner violence     Fear of current or ex partner: None     Emotionally abused: None     Physically abused: None     Forced sexual activity: None   Other Topics Concern     None   Social History Narrative     None         Objective:       /87   Pulse 94   Temp 98.5  F (36.9  C)   Resp 16   SpO2 98%   General appearance: alert, appears stated age, cooperative, no distress and non-toxic  Head: Normocephalic, without obvious abnormality, atraumatic  Ears: normal TM's and external ear canals both ears  Nose: no discharge  Throat: Left posterior oropharynx is erythematous, mild tonsillar swelling, no exudate, mucous membranes moist, lips and tongue normal  Neck: no adenopathy and supple, symmetrical, trachea midline  Lungs: clear to auscultation bilaterally  Heart: regular rate and rhythm, S1, S2 normal, no murmur, click, rub or gallop     Lab Results    Recent Results (from the past 24 hour(s))   Rapid Strep A Screen-Throat swab    Specimen: Throat   Result Value Ref Range    Rapid Strep A Antigen No Group A Strep detected, presumptive negative No Group A Strep detected, presumptive negative     I personally reviewed these results and discussed findings with the patient.

## 2021-06-28 ENCOUNTER — MYC REFILL (OUTPATIENT)
Dept: NEUROLOGY | Facility: CLINIC | Age: 36
End: 2021-06-28

## 2021-06-28 DIAGNOSIS — G35 MULTIPLE SCLEROSIS (H): ICD-10-CM

## 2021-06-29 RX ORDER — DEXTROAMPHETAMINE SACCHARATE, AMPHETAMINE ASPARTATE MONOHYDRATE, DEXTROAMPHETAMINE SULFATE AND AMPHETAMINE SULFATE 5; 5; 5; 5 MG/1; MG/1; MG/1; MG/1
20 CAPSULE, EXTENDED RELEASE ORAL DAILY
Qty: 30 CAPSULE | Refills: 0 | Status: SHIPPED | OUTPATIENT
Start: 2021-06-29 | End: 2021-07-28

## 2021-06-29 RX ORDER — DEXTROAMPHETAMINE SACCHARATE, AMPHETAMINE ASPARTATE, DEXTROAMPHETAMINE SULFATE AND AMPHETAMINE SULFATE 2.5; 2.5; 2.5; 2.5 MG/1; MG/1; MG/1; MG/1
10 TABLET ORAL DAILY
Qty: 30 TABLET | Refills: 0 | Status: SHIPPED | OUTPATIENT
Start: 2021-06-29 | End: 2021-07-28

## 2021-06-29 NOTE — TELEPHONE ENCOUNTER
Patient sent [employer] message requesting refill of their Adderalls; Patient was last seen in June 2020 and has follow up appointment in August with Dr. Phelps; Pended rx to Dr. Phelps for signature and will send electronically to the pharmacy once signed.    Airam Cazares MS RN Care Coordinator

## 2021-07-28 ENCOUNTER — MYC REFILL (OUTPATIENT)
Dept: NEUROLOGY | Facility: CLINIC | Age: 36
End: 2021-07-28

## 2021-07-28 DIAGNOSIS — G35 MULTIPLE SCLEROSIS (H): ICD-10-CM

## 2021-07-28 NOTE — TELEPHONE ENCOUNTER
Patient requesting refill of their Adderall Rxs; Patient was last seen in June 2020 and has follow up appointment in August with Dr. Phelps. Pended rx to Dr. Phelpsfor signature and will send electronically to the pharmacy once signed.    Teresa Chinchilla RN

## 2021-07-29 RX ORDER — DEXTROAMPHETAMINE SACCHARATE, AMPHETAMINE ASPARTATE MONOHYDRATE, DEXTROAMPHETAMINE SULFATE AND AMPHETAMINE SULFATE 5; 5; 5; 5 MG/1; MG/1; MG/1; MG/1
20 CAPSULE, EXTENDED RELEASE ORAL DAILY
Qty: 30 CAPSULE | Refills: 0 | Status: SHIPPED | OUTPATIENT
Start: 2021-07-29 | End: 2021-08-28

## 2021-07-29 RX ORDER — DEXTROAMPHETAMINE SACCHARATE, AMPHETAMINE ASPARTATE, DEXTROAMPHETAMINE SULFATE AND AMPHETAMINE SULFATE 2.5; 2.5; 2.5; 2.5 MG/1; MG/1; MG/1; MG/1
10 TABLET ORAL DAILY
Qty: 30 TABLET | Refills: 0 | Status: SHIPPED | OUTPATIENT
Start: 2021-07-29 | End: 2021-08-28

## 2021-08-10 ENCOUNTER — LAB (OUTPATIENT)
Dept: LAB | Facility: CLINIC | Age: 36
End: 2021-08-10
Payer: COMMERCIAL

## 2021-08-10 ENCOUNTER — OFFICE VISIT (OUTPATIENT)
Dept: NEUROLOGY | Facility: CLINIC | Age: 36
End: 2021-08-10
Attending: PSYCHIATRY & NEUROLOGY
Payer: COMMERCIAL

## 2021-08-10 VITALS
SYSTOLIC BLOOD PRESSURE: 144 MMHG | WEIGHT: 216.9 LBS | HEART RATE: 117 BPM | OXYGEN SATURATION: 100 % | BODY MASS INDEX: 28.62 KG/M2 | DIASTOLIC BLOOD PRESSURE: 97 MMHG

## 2021-08-10 DIAGNOSIS — G35 MULTIPLE SCLEROSIS (H): Primary | ICD-10-CM

## 2021-08-10 DIAGNOSIS — G35 MULTIPLE SCLEROSIS (H): ICD-10-CM

## 2021-08-10 LAB
ALT SERPL W P-5'-P-CCNC: 53 U/L (ref 0–70)
AST SERPL W P-5'-P-CCNC: 21 U/L (ref 0–45)
ERYTHROCYTE [DISTWIDTH] IN BLOOD BY AUTOMATED COUNT: 12.5 % (ref 10–15)
HCT VFR BLD AUTO: 48.6 % (ref 40–53)
HGB BLD-MCNC: 16.3 G/DL (ref 13.3–17.7)
MCH RBC QN AUTO: 30.2 PG (ref 26.5–33)
MCHC RBC AUTO-ENTMCNC: 33.5 G/DL (ref 31.5–36.5)
MCV RBC AUTO: 90 FL (ref 78–100)
PLATELET # BLD AUTO: 224 10E3/UL (ref 150–450)
RBC # BLD AUTO: 5.39 10E6/UL (ref 4.4–5.9)
WBC # BLD AUTO: 4.9 10E3/UL (ref 4–11)

## 2021-08-10 PROCEDURE — 84450 TRANSFERASE (AST) (SGOT): CPT | Performed by: PATHOLOGY

## 2021-08-10 PROCEDURE — 84460 ALANINE AMINO (ALT) (SGPT): CPT | Performed by: PATHOLOGY

## 2021-08-10 PROCEDURE — 85027 COMPLETE CBC AUTOMATED: CPT | Performed by: PATHOLOGY

## 2021-08-10 PROCEDURE — 99214 OFFICE O/P EST MOD 30 MIN: CPT | Performed by: PSYCHIATRY & NEUROLOGY

## 2021-08-10 PROCEDURE — G0463 HOSPITAL OUTPT CLINIC VISIT: HCPCS

## 2021-08-10 PROCEDURE — 36415 COLL VENOUS BLD VENIPUNCTURE: CPT | Performed by: PATHOLOGY

## 2021-08-10 ASSESSMENT — PAIN SCALES - GENERAL: PAINLEVEL: NO PAIN (0)

## 2021-08-13 NOTE — PROGRESS NOTES
"Service Date: 08/10/2021    REASON FOR VISIT:  Darin Maier is a 36-year-old man who I follow for multiple sclerosis.  He returns for routine followup.  I last saw him in 06/2020.    HISTORY OF PRESENT ILLNESS:  Darin tells me he has been doing well overall.  He still notices some occasional vision changes since his left optic neuritis in 2019.  His feet continue to feel heavy and somewhat painful \"like walking on gravel.\" He continues to take Rebif and generally tolerates it well, although he called in June with an injection site reaction.  He had injected basically on the anterior right hip and had a painful area of induration that was really limiting his mobility.  We tried topical lidocaine and even some tramadol, but neither of those were very helpful, but it did eventually resolve.    Other than the rather vague visual complaints and the pain in the feet, his only other real residual symptom is fatigue for which he takes Adderall.  He has not had any significant interim non-neurologic medical issues, although he has had serious family medical problems with his wife having had breast cancer and his dad now with pancreatic cancer.    PHYSICAL EXAMINATION:  Blood pressure 144/97, pulse 117, weight 216 pounds.  He is alert and oriented.  Affect is bright and language functions are normal.  Cranial nerves are unremarkable.  Muscle bulk, tone, strength and dexterity are normal in all 4 limbs.  Light touch is intact in the arms and legs.  Deep tendon reflexes are normal and symmetric.  His gait is narrow-based and stable.    IMPRESSION:  Relapsing-remitting multiple sclerosis, clinically stable on interferon beta 1a.  I spent 34 minutes on Darin's care on the date of service, which included chart review and face-to-face time.  We discussed the injection site reaction.  I told him that the area where he injected is not really in the recommended places as it was down on the belt line almost over the inguinal " canal.  I reviewed typical places for injection on the abdomen, thighs, subcutaneous fat above the posterior hips and backs of the upper arms.  We discussed MRI monitoring as well and we will get a repeat surveillance MRI later this year at his request, rather than waiting until next summer.    PLAN:    1.  AST, ALT and CBC today.  2.  MRI in December.  3.  Follow up in 1 year.    Christian Phelps MD        D: 2021   T: 2021   MT: MAKAYLA    Name:     JESSY ARREOLARoxy  MRN:      [id number]        Account:      [id number]   :      1985           Service Date: 08/10/2021       Document: [id number]

## 2021-08-23 ENCOUNTER — TELEPHONE (OUTPATIENT)
Dept: NEUROLOGY | Facility: CLINIC | Age: 36
End: 2021-08-23

## 2021-08-23 NOTE — TELEPHONE ENCOUNTER
I tried calling the patient to assess further, however, he didn't answer; I will send the information I have over to Dr. Phelps for input, then call the patient back again later.    Airam Cazares, MS RN Care Coordinator

## 2021-08-23 NOTE — TELEPHONE ENCOUNTER
I called Darin with Dr. 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    Airam Cazares, MS RN Care Coordinator

## 2021-08-23 NOTE — TELEPHONE ENCOUNTER
Health Call Center    Phone Message    May a detailed message be left on voicemail: yes     Reason for Call: Symptoms or Concerns     If patient has red-flag symptoms, warm transfer to triage line    Current symptom or concern: Numbness in left shin, downwards    Symptoms have been present for:  4 day(s)    Has patient previously been seen for this? Yes    By : Dr. Christian Esquivel    Date: last seen for his f/u a few weeks ago with Dr. Phelps     Are there any new or worsening symptoms? Yes: pt worked out in the heat this past Friday, 8/20/21. Pt has not had a flare up in a long time, but that day he got heat exhaustion, pt vomited that night and then numbness set into leg leg. Pt is asking IF he should start on OTC steroids to boost back up OR what should he do? Please call pt. Thank you.      Action Taken: Message routed to:  [facility]    Travel Screening: Not Applicable

## 2021-08-24 DIAGNOSIS — G35 MULTIPLE SCLEROSIS (H): ICD-10-CM

## 2021-08-24 RX ORDER — INTERFERON BETA-1A 44 UG/.5ML
INJECTION, SOLUTION SUBCUTANEOUS
Qty: 6 ML | Refills: 11 | Status: SHIPPED | OUTPATIENT
Start: 2021-08-24 | End: 2023-12-08

## 2021-08-24 NOTE — TELEPHONE ENCOUNTER
Received refill request for Rebif from [facility]; Patient was last seen in August and has follow up appointment in August with Dr. Phelps; Refilled per MS refill protocol.    Airam Cazares MS RN Care Coordinator

## 2021-08-28 ENCOUNTER — MYC REFILL (OUTPATIENT)
Dept: NEUROLOGY | Facility: CLINIC | Age: 36
End: 2021-08-28

## 2021-08-28 DIAGNOSIS — G35 MULTIPLE SCLEROSIS (H): ICD-10-CM

## 2021-08-30 RX ORDER — DEXTROAMPHETAMINE SACCHARATE, AMPHETAMINE ASPARTATE, DEXTROAMPHETAMINE SULFATE AND AMPHETAMINE SULFATE 2.5; 2.5; 2.5; 2.5 MG/1; MG/1; MG/1; MG/1
10 TABLET ORAL DAILY
Qty: 30 TABLET | Refills: 0 | Status: SHIPPED | OUTPATIENT
Start: 2021-08-30 | End: 2021-09-26

## 2021-08-30 RX ORDER — DEXTROAMPHETAMINE SACCHARATE, AMPHETAMINE ASPARTATE MONOHYDRATE, DEXTROAMPHETAMINE SULFATE AND AMPHETAMINE SULFATE 5; 5; 5; 5 MG/1; MG/1; MG/1; MG/1
20 CAPSULE, EXTENDED RELEASE ORAL DAILY
Qty: 30 CAPSULE | Refills: 0 | Status: SHIPPED | OUTPATIENT
Start: 2021-08-30 | End: 2021-09-26

## 2021-08-30 NOTE — TELEPHONE ENCOUNTER
Patient requesting refill of their Adderall; Patient was last seen in August and has follow up appointment next August with Dr. hPelps. Pended rx to Dr. Phelps for signature and will send electronically to the pharmacy once signed.    Teresa Chinchilla RN

## 2021-09-25 ENCOUNTER — HEALTH MAINTENANCE LETTER (OUTPATIENT)
Age: 36
End: 2021-09-25

## 2021-09-26 ENCOUNTER — MYC REFILL (OUTPATIENT)
Dept: NEUROLOGY | Facility: CLINIC | Age: 36
End: 2021-09-26

## 2021-09-26 DIAGNOSIS — G35 MULTIPLE SCLEROSIS (H): ICD-10-CM

## 2021-09-27 RX ORDER — DEXTROAMPHETAMINE SACCHARATE, AMPHETAMINE ASPARTATE MONOHYDRATE, DEXTROAMPHETAMINE SULFATE AND AMPHETAMINE SULFATE 5; 5; 5; 5 MG/1; MG/1; MG/1; MG/1
20 CAPSULE, EXTENDED RELEASE ORAL DAILY
Qty: 30 CAPSULE | Refills: 0 | Status: SHIPPED | OUTPATIENT
Start: 2021-09-27 | End: 2021-10-24

## 2021-09-27 RX ORDER — DEXTROAMPHETAMINE SACCHARATE, AMPHETAMINE ASPARTATE, DEXTROAMPHETAMINE SULFATE AND AMPHETAMINE SULFATE 2.5; 2.5; 2.5; 2.5 MG/1; MG/1; MG/1; MG/1
10 TABLET ORAL DAILY
Qty: 30 TABLET | Refills: 0 | Status: SHIPPED | OUTPATIENT
Start: 2021-09-27 | End: 2021-10-24

## 2021-09-27 NOTE — TELEPHONE ENCOUNTER
Patient requesting refill of their Adderall; Patient was last seen in August and has follow up appointment next August with Dr. Phelps. Pended rx to Dr. Phelps for signature and will send electronically to the pharmacy once signed.    Teresa Chinchilla RN

## 2021-10-24 ENCOUNTER — MYC REFILL (OUTPATIENT)
Dept: NEUROLOGY | Facility: CLINIC | Age: 36
End: 2021-10-24

## 2021-10-24 DIAGNOSIS — G35 MULTIPLE SCLEROSIS (H): ICD-10-CM

## 2021-10-25 RX ORDER — DEXTROAMPHETAMINE SACCHARATE, AMPHETAMINE ASPARTATE MONOHYDRATE, DEXTROAMPHETAMINE SULFATE AND AMPHETAMINE SULFATE 5; 5; 5; 5 MG/1; MG/1; MG/1; MG/1
20 CAPSULE, EXTENDED RELEASE ORAL DAILY
Qty: 30 CAPSULE | Refills: 0 | Status: SHIPPED | OUTPATIENT
Start: 2021-10-25 | End: 2021-11-24

## 2021-10-25 RX ORDER — DEXTROAMPHETAMINE SACCHARATE, AMPHETAMINE ASPARTATE, DEXTROAMPHETAMINE SULFATE AND AMPHETAMINE SULFATE 2.5; 2.5; 2.5; 2.5 MG/1; MG/1; MG/1; MG/1
10 TABLET ORAL DAILY
Qty: 30 TABLET | Refills: 0 | Status: SHIPPED | OUTPATIENT
Start: 2021-10-25 | End: 2021-11-24

## 2021-11-16 DIAGNOSIS — K21.9 GASTROESOPHAGEAL REFLUX DISEASE WITHOUT ESOPHAGITIS: ICD-10-CM

## 2021-11-17 NOTE — TELEPHONE ENCOUNTER
Received refill request for omeprazole from [facility]; Patient was last seen in August and has follow up appointment in August 2022 with Dr Phelps. Refilled per MS refill protocol.    Josi Little RN

## 2021-11-19 DIAGNOSIS — K21.9 GASTROESOPHAGEAL REFLUX DISEASE WITHOUT ESOPHAGITIS: ICD-10-CM

## 2021-11-24 ENCOUNTER — MYC REFILL (OUTPATIENT)
Dept: NEUROLOGY | Facility: CLINIC | Age: 36
End: 2021-11-24
Payer: COMMERCIAL

## 2021-11-24 DIAGNOSIS — G35 MULTIPLE SCLEROSIS (H): ICD-10-CM

## 2021-11-24 NOTE — TELEPHONE ENCOUNTER
Patient questing refill of their Adderall; Patient was last seen in August and has follow up appointment next August with Dr. Phelps. Pended rx to Dr. Phelps for signature and will send electronically to the pharmacy once signed.    Teresa Chinchilla RN

## 2021-11-28 DIAGNOSIS — G35 MULTIPLE SCLEROSIS (H): ICD-10-CM

## 2021-11-29 RX ORDER — DEXTROAMPHETAMINE SULFATE, DEXTROAMPHETAMINE SACCHARATE, AMPHETAMINE SULFATE AND AMPHETAMINE ASPARTATE 5; 5; 5; 5 MG/1; MG/1; MG/1; MG/1
CAPSULE, EXTENDED RELEASE ORAL
Qty: 30 CAPSULE | Refills: 0 | OUTPATIENT
Start: 2021-11-29

## 2021-11-29 RX ORDER — DEXTROAMPHETAMINE SACCHARATE, AMPHETAMINE ASPARTATE MONOHYDRATE, DEXTROAMPHETAMINE SULFATE AND AMPHETAMINE SULFATE 5; 5; 5; 5 MG/1; MG/1; MG/1; MG/1
20 CAPSULE, EXTENDED RELEASE ORAL DAILY
Qty: 30 CAPSULE | Refills: 0 | Status: SHIPPED | OUTPATIENT
Start: 2021-11-29 | End: 2021-12-27

## 2021-11-29 RX ORDER — DEXTROAMPHETAMINE SACCHARATE, AMPHETAMINE ASPARTATE, DEXTROAMPHETAMINE SULFATE AND AMPHETAMINE SULFATE 2.5; 2.5; 2.5; 2.5 MG/1; MG/1; MG/1; MG/1
10 TABLET ORAL DAILY
Qty: 30 TABLET | Refills: 0 | Status: SHIPPED | OUTPATIENT
Start: 2021-11-29 | End: 2021-12-27

## 2021-12-27 ENCOUNTER — MYC REFILL (OUTPATIENT)
Dept: NEUROLOGY | Facility: CLINIC | Age: 36
End: 2021-12-27
Payer: COMMERCIAL

## 2021-12-27 DIAGNOSIS — G35 MULTIPLE SCLEROSIS (H): ICD-10-CM

## 2021-12-27 NOTE — TELEPHONE ENCOUNTER
Patient requesting refill of their Adderall 10 mg and Adderall XR 20 mg; Patient was last seen in August and has follow up appointment in August 2022 with Dr Phelps. Pended rx to Dr Phelps for signature and will send electronically to the pharmacy once signed.    Josi Little RN

## 2021-12-28 RX ORDER — DEXTROAMPHETAMINE SACCHARATE, AMPHETAMINE ASPARTATE MONOHYDRATE, DEXTROAMPHETAMINE SULFATE AND AMPHETAMINE SULFATE 5; 5; 5; 5 MG/1; MG/1; MG/1; MG/1
20 CAPSULE, EXTENDED RELEASE ORAL DAILY
Qty: 30 CAPSULE | Refills: 0 | Status: SHIPPED | OUTPATIENT
Start: 2021-12-28 | End: 2022-01-25

## 2021-12-28 RX ORDER — DEXTROAMPHETAMINE SACCHARATE, AMPHETAMINE ASPARTATE, DEXTROAMPHETAMINE SULFATE AND AMPHETAMINE SULFATE 2.5; 2.5; 2.5; 2.5 MG/1; MG/1; MG/1; MG/1
10 TABLET ORAL DAILY
Qty: 30 TABLET | Refills: 0 | Status: SHIPPED | OUTPATIENT
Start: 2021-12-28 | End: 2022-01-25

## 2022-01-10 ENCOUNTER — TELEPHONE (OUTPATIENT)
Dept: NEUROLOGY | Facility: CLINIC | Age: 37
End: 2022-01-10
Payer: COMMERCIAL

## 2022-01-10 NOTE — TELEPHONE ENCOUNTER
PA Initiation    Medication: REBIF-PENDING  Insurance Company:    Pharm[facility] the [facility] - [place], [place] - [address]  Filling Pharmacy Phone: [phone]  Filling Pharmacy Fax: [phone]  Start Date: 1/10/2022

## 2022-01-11 ENCOUNTER — TELEPHONE (OUTPATIENT)
Dept: NEUROLOGY | Facility: CLINIC | Age: 37
End: 2022-01-11
Payer: COMMERCIAL

## 2022-01-11 NOTE — TELEPHONE ENCOUNTER
Darin's insurance no longer covering Rebif. Per Dr. Phelps, ok for Darin to switch to Plegridy.  Will also send Darin a my chart message.    Teresa Chinchilla RN

## 2022-01-11 NOTE — TELEPHONE ENCOUNTER
PRIOR AUTHORIZATION DENIED    Medication: REBIF-DENIED    Denial Date: 1/11/2022    Denial Rational:       Appeal Information:

## 2022-01-15 ENCOUNTER — HEALTH MAINTENANCE LETTER (OUTPATIENT)
Age: 37
End: 2022-01-15

## 2022-01-19 ENCOUNTER — TELEPHONE (OUTPATIENT)
Dept: NEUROLOGY | Facility: CLINIC | Age: 37
End: 2022-01-19
Payer: COMMERCIAL

## 2022-01-19 DIAGNOSIS — G35 MULTIPLE SCLEROSIS (H): Primary | ICD-10-CM

## 2022-01-19 NOTE — TELEPHONE ENCOUNTER
Prior Authorization Specialty Medication Request    Medication/Dose: Plegridy Titration Pack and 125 mcg subcutaneuous pens  ICD code (if different than what is on RX):  Multiple Sclerosis, G35  Previously Tried and Failed:  Rebif (stable on Rebif, [facility] no longer covering)    Important Lab Values:   Rationale: Continuation of disease modifying therapy for demyelinating disease, please approve    Insurance Name: [employer]  Insurance ID: [id number]  Insurance Phone Number: [phone]    Pharmacy Information (if different than what is on RX)  Name:    Phone:

## 2022-01-21 NOTE — TELEPHONE ENCOUNTER
No PA needed for Plegridy.  Has a $200 co-pay but have left him a message to call me and I will explain how to get signed up for co-pay assistance card that should bring the cost to $0.

## 2022-01-24 DIAGNOSIS — G35 MULTIPLE SCLEROSIS (H): ICD-10-CM

## 2022-01-24 DIAGNOSIS — R52 PAIN: ICD-10-CM

## 2022-01-24 RX ORDER — GABAPENTIN 300 MG/1
CAPSULE ORAL
Qty: 120 CAPSULE | Refills: 0 | Status: SHIPPED | OUTPATIENT
Start: 2022-01-24 | End: 2022-02-27

## 2022-01-24 NOTE — TELEPHONE ENCOUNTER
Received refill request for gabapentin from [facility]; Patient was last seen in August and has follow up appointment next August with Dr. Phelps. Refilled per MS refill protocol.    Teresa Chinchilla RN

## 2022-01-25 ENCOUNTER — MYC REFILL (OUTPATIENT)
Dept: NEUROLOGY | Facility: CLINIC | Age: 37
End: 2022-01-25
Payer: COMMERCIAL

## 2022-01-25 DIAGNOSIS — G35 MULTIPLE SCLEROSIS (H): ICD-10-CM

## 2022-01-25 RX ORDER — PEGINTERFERON BETA-1A 125 UG/.5ML
125 INJECTION, SOLUTION SUBCUTANEOUS
Qty: 3 KIT | Refills: 11 | Status: SHIPPED | OUTPATIENT
Start: 2022-01-25

## 2022-01-25 RX ORDER — PEGINTERFERON BETA-1A 63-94 MCG
KIT SUBCUTANEOUS
Qty: 1 KIT | Refills: 0 | Status: SHIPPED | OUTPATIENT
Start: 2022-01-25

## 2022-01-25 NOTE — TELEPHONE ENCOUNTER
Plegridy is covered by insurance and has a $200 co-pay and he is working with the drug company to get a co-pay card that will cover it.  Can you send a new prescription to -Specialty Pharmacy?  Darin took his las dose of Rebif today and want to make sure he gets the Plegridy ASAP.  Let me know if you have any questions.    Thank you  Clifford Noland [facility]  Specialty Pharmacy Clinic Liaison ([place])  [facility]  [email or web address]   Phone: [phone]  Fax: [phone]

## 2022-01-26 RX ORDER — DEXTROAMPHETAMINE SACCHARATE, AMPHETAMINE ASPARTATE MONOHYDRATE, DEXTROAMPHETAMINE SULFATE AND AMPHETAMINE SULFATE 5; 5; 5; 5 MG/1; MG/1; MG/1; MG/1
20 CAPSULE, EXTENDED RELEASE ORAL DAILY
Qty: 30 CAPSULE | Refills: 0 | Status: SHIPPED | OUTPATIENT
Start: 2022-01-26 | End: 2022-02-22

## 2022-01-26 RX ORDER — DEXTROAMPHETAMINE SACCHARATE, AMPHETAMINE ASPARTATE, DEXTROAMPHETAMINE SULFATE AND AMPHETAMINE SULFATE 2.5; 2.5; 2.5; 2.5 MG/1; MG/1; MG/1; MG/1
10 TABLET ORAL DAILY
Qty: 30 TABLET | Refills: 0 | Status: SHIPPED | OUTPATIENT
Start: 2022-01-26 | End: 2022-02-22

## 2022-02-08 ENCOUNTER — TELEPHONE (OUTPATIENT)
Dept: NEUROLOGY | Facility: CLINIC | Age: 37
End: 2022-02-08
Payer: COMMERCIAL

## 2022-02-08 NOTE — TELEPHONE ENCOUNTER
Received message from [facility] nurse educator stating they have been unable to reach pt for injection training and starting Plegridy. I called patient's mobile number but was unable to reach patient. Zilico message sent.    Josi Little RN

## 2022-02-17 DIAGNOSIS — K21.9 GASTROESOPHAGEAL REFLUX DISEASE WITHOUT ESOPHAGITIS: ICD-10-CM

## 2022-02-17 NOTE — TELEPHONE ENCOUNTER
Received refill request for omeprazole from [facility]; Patient was last seen in August 2021 and has follow up appointment in August 2022 with Dr Phelps. Refilled per MS refill protocol.    Josi Little RN

## 2022-02-22 ENCOUNTER — TELEPHONE (OUTPATIENT)
Dept: NEUROLOGY | Facility: CLINIC | Age: 37
End: 2022-02-22
Payer: COMMERCIAL

## 2022-02-22 ENCOUNTER — MYC REFILL (OUTPATIENT)
Dept: NEUROLOGY | Facility: CLINIC | Age: 37
End: 2022-02-22
Payer: COMMERCIAL

## 2022-02-22 DIAGNOSIS — G35 MULTIPLE SCLEROSIS (H): ICD-10-CM

## 2022-02-22 NOTE — TELEPHONE ENCOUNTER
Patient requesting refill of their Adderall 10 mg and 20 mg; Patient was last seen in August 2021 and has follow up appointment in August 2022 with Dr Phelps. Pended rx to Dr Phelps for signature and will send electronically to the pharmacy once signed.    Josi Little RN

## 2022-02-23 RX ORDER — DEXTROAMPHETAMINE SACCHARATE, AMPHETAMINE ASPARTATE, DEXTROAMPHETAMINE SULFATE AND AMPHETAMINE SULFATE 2.5; 2.5; 2.5; 2.5 MG/1; MG/1; MG/1; MG/1
10 TABLET ORAL DAILY
Qty: 30 TABLET | Refills: 0 | Status: SHIPPED | OUTPATIENT
Start: 2022-02-23 | End: 2022-03-25

## 2022-02-23 RX ORDER — DEXTROAMPHETAMINE SACCHARATE, AMPHETAMINE ASPARTATE MONOHYDRATE, DEXTROAMPHETAMINE SULFATE AND AMPHETAMINE SULFATE 5; 5; 5; 5 MG/1; MG/1; MG/1; MG/1
20 CAPSULE, EXTENDED RELEASE ORAL DAILY
Qty: 30 CAPSULE | Refills: 0 | Status: SHIPPED | OUTPATIENT
Start: 2022-02-23 | End: 2022-03-25

## 2022-03-25 ENCOUNTER — MYC REFILL (OUTPATIENT)
Dept: NEUROLOGY | Facility: CLINIC | Age: 37
End: 2022-03-25
Payer: COMMERCIAL

## 2022-03-25 DIAGNOSIS — R52 PAIN: ICD-10-CM

## 2022-03-25 DIAGNOSIS — G35 MULTIPLE SCLEROSIS (H): ICD-10-CM

## 2022-03-25 RX ORDER — GABAPENTIN 300 MG/1
CAPSULE ORAL
Qty: 120 CAPSULE | Refills: 0 | Status: SHIPPED | OUTPATIENT
Start: 2022-03-25 | End: 2022-04-22

## 2022-03-25 NOTE — TELEPHONE ENCOUNTER
Patient requesting refill of their Adderall and gabapentin; Patient was last seen in August and has follow up appointment next August with Dr. Phelps. Gabapentin filled per MS refill protocol. Pended Adderall to Dr. Phelps for signature and will send electronically to the pharmacy once signed.    Teresa Chinchilla RN

## 2022-03-26 RX ORDER — DEXTROAMPHETAMINE SACCHARATE, AMPHETAMINE ASPARTATE, DEXTROAMPHETAMINE SULFATE AND AMPHETAMINE SULFATE 2.5; 2.5; 2.5; 2.5 MG/1; MG/1; MG/1; MG/1
10 TABLET ORAL DAILY
Qty: 30 TABLET | Refills: 0 | Status: SHIPPED | OUTPATIENT
Start: 2022-03-26 | End: 2022-04-20

## 2022-03-26 RX ORDER — DEXTROAMPHETAMINE SACCHARATE, AMPHETAMINE ASPARTATE MONOHYDRATE, DEXTROAMPHETAMINE SULFATE AND AMPHETAMINE SULFATE 5; 5; 5; 5 MG/1; MG/1; MG/1; MG/1
20 CAPSULE, EXTENDED RELEASE ORAL DAILY
Qty: 30 CAPSULE | Refills: 0 | Status: SHIPPED | OUTPATIENT
Start: 2022-03-26 | End: 2022-04-20

## 2022-03-28 DIAGNOSIS — R52 PAIN: ICD-10-CM

## 2022-03-28 DIAGNOSIS — G35 MULTIPLE SCLEROSIS (H): ICD-10-CM

## 2022-03-28 RX ORDER — GABAPENTIN 300 MG/1
CAPSULE ORAL
Qty: 120 CAPSULE | Refills: 0 | OUTPATIENT
Start: 2022-03-28

## 2022-04-20 ENCOUNTER — MYC REFILL (OUTPATIENT)
Dept: NEUROLOGY | Facility: CLINIC | Age: 37
End: 2022-04-20
Payer: COMMERCIAL

## 2022-04-20 DIAGNOSIS — G35 MULTIPLE SCLEROSIS (H): ICD-10-CM

## 2022-04-20 RX ORDER — DEXTROAMPHETAMINE SACCHARATE, AMPHETAMINE ASPARTATE, DEXTROAMPHETAMINE SULFATE AND AMPHETAMINE SULFATE 2.5; 2.5; 2.5; 2.5 MG/1; MG/1; MG/1; MG/1
10 TABLET ORAL DAILY
Qty: 30 TABLET | Refills: 0 | Status: SHIPPED | OUTPATIENT
Start: 2022-04-20 | End: 2022-05-21

## 2022-04-20 RX ORDER — DEXTROAMPHETAMINE SACCHARATE, AMPHETAMINE ASPARTATE MONOHYDRATE, DEXTROAMPHETAMINE SULFATE AND AMPHETAMINE SULFATE 5; 5; 5; 5 MG/1; MG/1; MG/1; MG/1
20 CAPSULE, EXTENDED RELEASE ORAL DAILY
Qty: 30 CAPSULE | Refills: 0 | Status: SHIPPED | OUTPATIENT
Start: 2022-04-20 | End: 2022-06-20

## 2022-04-20 NOTE — TELEPHONE ENCOUNTER
Patient requesting refill of their Adderall 10 mg and Adderall XR 20 mg; Patient was last seen in August 2021 and has follow up appointment in August 2022 with Dr Phelps. Pended rx to Dr Phelps for signature and will send electronically to the pharmacy once signed.    Josi Little RN

## 2022-04-22 DIAGNOSIS — G35 MULTIPLE SCLEROSIS (H): ICD-10-CM

## 2022-04-22 DIAGNOSIS — R52 PAIN: ICD-10-CM

## 2022-04-22 RX ORDER — GABAPENTIN 300 MG/1
CAPSULE ORAL
Qty: 120 CAPSULE | Refills: 5 | Status: SHIPPED | OUTPATIENT
Start: 2022-04-22 | End: 2022-10-20

## 2022-04-22 NOTE — TELEPHONE ENCOUNTER
Received refill request for gabapentin from [facility]; Patient was last seen in August 2021 and has follow up appointment in August 2022 with Dr Phelps. Refilled per MS refill protocol.    Josi Little RN

## 2022-05-20 DIAGNOSIS — K21.9 GASTROESOPHAGEAL REFLUX DISEASE WITHOUT ESOPHAGITIS: ICD-10-CM

## 2022-05-21 ENCOUNTER — MYC REFILL (OUTPATIENT)
Dept: NEUROLOGY | Facility: CLINIC | Age: 37
End: 2022-05-21
Payer: COMMERCIAL

## 2022-05-21 DIAGNOSIS — G35 MULTIPLE SCLEROSIS (H): ICD-10-CM

## 2022-05-21 DIAGNOSIS — K21.9 GASTROESOPHAGEAL REFLUX DISEASE WITHOUT ESOPHAGITIS: ICD-10-CM

## 2022-05-23 RX ORDER — DEXTROAMPHETAMINE SACCHARATE, AMPHETAMINE ASPARTATE, DEXTROAMPHETAMINE SULFATE AND AMPHETAMINE SULFATE 2.5; 2.5; 2.5; 2.5 MG/1; MG/1; MG/1; MG/1
10 TABLET ORAL DAILY
Qty: 30 TABLET | Refills: 0 | Status: SHIPPED | OUTPATIENT
Start: 2022-05-23 | End: 2022-06-20

## 2022-05-23 NOTE — TELEPHONE ENCOUNTER
Patient requesting refill of their Adderall 10mg; Patient was last seen in August 2021 and has follow up appointment in August 2022 with Dr Phelps. Pended rx to Dr Phelps for signature and will send electronically to the pharmacy once signed.    Josi Little RN

## 2022-05-23 NOTE — TELEPHONE ENCOUNTER
Received refill request for omeprazole from [facility]; Patient was last seen in August 2021 and has follow up appointment in August 2022 with Dr Phelps. Refilled per MS refill protocol.    Josi Little RN

## 2022-06-20 ENCOUNTER — MYC REFILL (OUTPATIENT)
Dept: NEUROLOGY | Facility: CLINIC | Age: 37
End: 2022-06-20
Payer: COMMERCIAL

## 2022-06-20 DIAGNOSIS — G35 MULTIPLE SCLEROSIS (H): ICD-10-CM

## 2022-06-20 RX ORDER — DEXTROAMPHETAMINE SACCHARATE, AMPHETAMINE ASPARTATE, DEXTROAMPHETAMINE SULFATE AND AMPHETAMINE SULFATE 2.5; 2.5; 2.5; 2.5 MG/1; MG/1; MG/1; MG/1
10 TABLET ORAL DAILY
Qty: 30 TABLET | Refills: 0 | Status: SHIPPED | OUTPATIENT
Start: 2022-06-20 | End: 2022-07-18

## 2022-06-20 RX ORDER — DEXTROAMPHETAMINE SACCHARATE, AMPHETAMINE ASPARTATE MONOHYDRATE, DEXTROAMPHETAMINE SULFATE AND AMPHETAMINE SULFATE 5; 5; 5; 5 MG/1; MG/1; MG/1; MG/1
20 CAPSULE, EXTENDED RELEASE ORAL DAILY
Qty: 30 CAPSULE | Refills: 0 | Status: SHIPPED | OUTPATIENT
Start: 2022-06-20 | End: 2022-07-18

## 2022-07-18 ENCOUNTER — MYC REFILL (OUTPATIENT)
Dept: NEUROLOGY | Facility: CLINIC | Age: 37
End: 2022-07-18

## 2022-07-18 DIAGNOSIS — G35 MULTIPLE SCLEROSIS (H): ICD-10-CM

## 2022-07-18 RX ORDER — DEXTROAMPHETAMINE SACCHARATE, AMPHETAMINE ASPARTATE, DEXTROAMPHETAMINE SULFATE AND AMPHETAMINE SULFATE 2.5; 2.5; 2.5; 2.5 MG/1; MG/1; MG/1; MG/1
10 TABLET ORAL DAILY
Qty: 30 TABLET | Refills: 0 | Status: SHIPPED | OUTPATIENT
Start: 2022-07-18 | End: 2022-08-25

## 2022-07-18 RX ORDER — DEXTROAMPHETAMINE SACCHARATE, AMPHETAMINE ASPARTATE MONOHYDRATE, DEXTROAMPHETAMINE SULFATE AND AMPHETAMINE SULFATE 5; 5; 5; 5 MG/1; MG/1; MG/1; MG/1
20 CAPSULE, EXTENDED RELEASE ORAL DAILY
Qty: 30 CAPSULE | Refills: 0 | Status: SHIPPED | OUTPATIENT
Start: 2022-07-18 | End: 2022-08-25

## 2022-07-18 NOTE — TELEPHONE ENCOUNTER
Patient requesting refill of their Adderall; Patient was last seen in August 2021 and has follow up appointment this August with Dr. Phelps. Pended rx to Dr. Phelps for signature and will send electronically to the pharmacy once signed.    Teresa Chinchilla RN

## 2022-08-09 ENCOUNTER — LAB (OUTPATIENT)
Dept: LAB | Facility: CLINIC | Age: 37
End: 2022-08-09
Payer: COMMERCIAL

## 2022-08-09 ENCOUNTER — OFFICE VISIT (OUTPATIENT)
Dept: NEUROLOGY | Facility: CLINIC | Age: 37
End: 2022-08-09
Attending: PSYCHIATRY & NEUROLOGY
Payer: COMMERCIAL

## 2022-08-09 VITALS
SYSTOLIC BLOOD PRESSURE: 105 MMHG | BODY MASS INDEX: 28.85 KG/M2 | HEIGHT: 73 IN | WEIGHT: 217.7 LBS | DIASTOLIC BLOOD PRESSURE: 63 MMHG | OXYGEN SATURATION: 99 % | HEART RATE: 105 BPM

## 2022-08-09 DIAGNOSIS — G35 MULTIPLE SCLEROSIS (H): ICD-10-CM

## 2022-08-09 DIAGNOSIS — M79.2 NEUROPATHIC PAIN: Primary | ICD-10-CM

## 2022-08-09 LAB
ALT SERPL W P-5'-P-CCNC: 49 U/L (ref 0–70)
AST SERPL W P-5'-P-CCNC: 24 U/L (ref 0–45)
ERYTHROCYTE [DISTWIDTH] IN BLOOD BY AUTOMATED COUNT: 12.1 % (ref 10–15)
HCT VFR BLD AUTO: 45.1 % (ref 40–53)
HGB BLD-MCNC: 15.4 G/DL (ref 13.3–17.7)
MCH RBC QN AUTO: 30.2 PG (ref 26.5–33)
MCHC RBC AUTO-ENTMCNC: 34.1 G/DL (ref 31.5–36.5)
MCV RBC AUTO: 88 FL (ref 78–100)
PLATELET # BLD AUTO: 203 10E3/UL (ref 150–450)
RBC # BLD AUTO: 5.1 10E6/UL (ref 4.4–5.9)
WBC # BLD AUTO: 4.8 10E3/UL (ref 4–11)

## 2022-08-09 PROCEDURE — 84460 ALANINE AMINO (ALT) (SGPT): CPT | Performed by: PATHOLOGY

## 2022-08-09 PROCEDURE — G0463 HOSPITAL OUTPT CLINIC VISIT: HCPCS

## 2022-08-09 PROCEDURE — 84450 TRANSFERASE (AST) (SGOT): CPT | Performed by: PATHOLOGY

## 2022-08-09 PROCEDURE — 85027 COMPLETE CBC AUTOMATED: CPT | Performed by: PATHOLOGY

## 2022-08-09 PROCEDURE — 99214 OFFICE O/P EST MOD 30 MIN: CPT | Performed by: PSYCHIATRY & NEUROLOGY

## 2022-08-09 PROCEDURE — 36415 COLL VENOUS BLD VENIPUNCTURE: CPT | Performed by: PATHOLOGY

## 2022-08-09 RX ORDER — NORTRIPTYLINE HYDROCHLORIDE 50 MG/1
50 CAPSULE ORAL AT BEDTIME
Qty: 30 CAPSULE | Refills: 11 | Status: SHIPPED | OUTPATIENT
Start: 2022-08-09

## 2022-08-09 ASSESSMENT — PAIN SCALES - GENERAL: PAINLEVEL: NO PAIN (0)

## 2022-08-09 NOTE — PROGRESS NOTES
Service Date: 08/09/2022    REASON FOR VISIT:  Darin Maier is a 37-year-old man whom I follow for multiple sclerosis.  He returns for routine followup.  I last saw him 1 year ago.    HISTORY OF PRESENT ILLNESS:  Darin has noticed worsened pain in his legs and feet recently.  He says the other night he could not fall asleep until 3 in the morning because of that.  He has also been quite fatigued; he says that the Adderall does not seem to help much anymore.  He has noted a lack of interest in things, such as his son's baseball games.  He was switched to Plegridy from [employer] because of insurance changes, and that is going fine.  He has some subtle residual vision loss in the left eye since his optic neuritis 3 years ago.  He again brings up that he cannot bowhunt in the evenings anymore because of that vision, that he cannot see well out of that eye after dusk.    PHYSICAL EXAMINATION:  Blood pressure 105/63, pulse 105, weight 217 pounds.  He is alert and oriented.  Affect is bright, and language functions are normal.  Cranial nerves are unremarkable.  Muscle bulk, tone, strength and dexterity are normal in the arms and legs.  Light touch is intact in all 4 limbs.  Deep tendon reflexes are brisk and symmetric, and his gait is narrow based and stable.    IMPRESSION:    1.  Relapsing-remitting multiple sclerosis, stable on interferon beta 1a.  2.  Neuropathic pain in the legs.    I spent 31 minutes on his care today, including chart review, face-to-face and documentation time.  We discussed management of neuropathic pain.  He is taking gabapentin 1200 mg at bedtime.  I told him I think adding an SNRI makes sense as the next step.  We discussed safety monitoring with interferon and MRI surveillance.    PLAN:    1.  AST, ALT and CBC today.  2.  MRI brain and cervical spine.  3.  Nortriptyline 50 mg p.o. at bedtime.  4.  Follow up in 1 year.    Christian Pehlps MD        D: 08/09/2022   T: 08/09/2022   MT:  mm    Name:     JESSY ARREOLA  MRN:      [id number]        Account:      [id number]   :      1985           Service Date: 2022       Document: [id number]

## 2022-08-25 ENCOUNTER — MYC REFILL (OUTPATIENT)
Dept: NEUROLOGY | Facility: CLINIC | Age: 37
End: 2022-08-25

## 2022-08-25 DIAGNOSIS — G35 MULTIPLE SCLEROSIS (H): ICD-10-CM

## 2022-08-25 DIAGNOSIS — K21.9 GASTROESOPHAGEAL REFLUX DISEASE WITHOUT ESOPHAGITIS: ICD-10-CM

## 2022-08-26 RX ORDER — DEXTROAMPHETAMINE SACCHARATE, AMPHETAMINE ASPARTATE MONOHYDRATE, DEXTROAMPHETAMINE SULFATE AND AMPHETAMINE SULFATE 5; 5; 5; 5 MG/1; MG/1; MG/1; MG/1
20 CAPSULE, EXTENDED RELEASE ORAL DAILY
Qty: 30 CAPSULE | Refills: 0 | Status: SHIPPED | OUTPATIENT
Start: 2022-08-26 | End: 2022-09-20

## 2022-08-26 RX ORDER — DEXTROAMPHETAMINE SACCHARATE, AMPHETAMINE ASPARTATE, DEXTROAMPHETAMINE SULFATE AND AMPHETAMINE SULFATE 2.5; 2.5; 2.5; 2.5 MG/1; MG/1; MG/1; MG/1
10 TABLET ORAL DAILY
Qty: 30 TABLET | Refills: 0 | Status: SHIPPED | OUTPATIENT
Start: 2022-08-26 | End: 2022-09-20

## 2022-08-26 NOTE — TELEPHONE ENCOUNTER
Patient requesting refill of their Adderall 10 mg, Adderall XR 20 mg, and omeprazole; Patient was last seen in August and has follow up appointment in August of next year with Dr Phelps. Pended rx to Dr Phelps for signature and will send electronically to the pharmacy once signed.    Josi Little RN

## 2022-08-31 ENCOUNTER — TRANSFERRED RECORDS (OUTPATIENT)
Dept: NEUROLOGY | Facility: CLINIC | Age: 37
End: 2022-08-31

## 2022-09-12 ENCOUNTER — MEDICAL CORRESPONDENCE (OUTPATIENT)
Dept: HEALTH INFORMATION MANAGEMENT | Facility: CLINIC | Age: 37
End: 2022-09-12

## 2022-09-15 ENCOUNTER — TELEPHONE (OUTPATIENT)
Dept: NEUROLOGY | Facility: CLINIC | Age: 37
End: 2022-09-15

## 2022-09-15 NOTE — TELEPHONE ENCOUNTER
Prior Authorization Not Needed per Insurance    Medication: Dimethyl fumarate 120 & 240MG Capsules (NO PA NEEDED)    Insurance Company: [employer] - Phone [phone] Fax [phone]  Expected CoPay: $117.45    Pharmacy Filling the Rx: [facility] - [place], [place] - [address]  Pharmacy Notified:    Patient Notified:      A PA is not needed for Dimethyl Fumarate through [facility]. The expected cost is for a 30 day supply is $117.45. This is his co-insurance. Unfortunately, there isn't any copay assistance available. He can continue to fill with [facility].    Thank you,    Libby Caro [facility]  Specialty Pharmacy Clinic Liaison - [facility] [address]  [address] [place] [address]  Ph: [phone] Fax: [phone]  [email or web address]

## 2022-09-15 NOTE — TELEPHONE ENCOUNTER
Prior Authorization Specialty Medication Request    Medication/Dose: dimethyl fumarate, 120 mg and 240 mg capsules  ICD code (if different than what is on RX):  Multiple Sclerosis, G35  Previously Tried and Failed:  Plegridy    Important Lab Values:   Rationale: Continuation of disease modifying therapy for demyelinating disease, please approve    Insurance Name: [employer]  Insurance ID: [id number]  Insurance Phone Number: [phone]    Pharmacy Information (if different than what is on RX)  Name:    Phone:

## 2022-09-19 ENCOUNTER — TRANSFERRED RECORDS (OUTPATIENT)
Dept: NEUROLOGY | Facility: CLINIC | Age: 37
End: 2022-09-19

## 2022-09-20 ENCOUNTER — MYC REFILL (OUTPATIENT)
Dept: NEUROLOGY | Facility: CLINIC | Age: 37
End: 2022-09-20

## 2022-09-20 DIAGNOSIS — G35 MULTIPLE SCLEROSIS (H): ICD-10-CM

## 2022-09-20 NOTE — TELEPHONE ENCOUNTER
Patient requesting refill of their Adderall 10 mg and Adderall XR 20 mg; Patient was last seen in August and has follow up appointment in August 2023 with Dr Phelps. Pended rx to Dr Phelps for signature and will send electronically to the pharmacy once signed.    Josi Little RN

## 2022-09-21 RX ORDER — DEXTROAMPHETAMINE SACCHARATE, AMPHETAMINE ASPARTATE MONOHYDRATE, DEXTROAMPHETAMINE SULFATE AND AMPHETAMINE SULFATE 5; 5; 5; 5 MG/1; MG/1; MG/1; MG/1
20 CAPSULE, EXTENDED RELEASE ORAL DAILY
Qty: 30 CAPSULE | Refills: 0 | Status: SHIPPED | OUTPATIENT
Start: 2022-09-21 | End: 2022-10-17

## 2022-09-21 RX ORDER — DEXTROAMPHETAMINE SACCHARATE, AMPHETAMINE ASPARTATE, DEXTROAMPHETAMINE SULFATE AND AMPHETAMINE SULFATE 2.5; 2.5; 2.5; 2.5 MG/1; MG/1; MG/1; MG/1
10 TABLET ORAL DAILY
Qty: 30 TABLET | Refills: 0 | Status: SHIPPED | OUTPATIENT
Start: 2022-09-21 | End: 2022-10-17

## 2022-10-07 ENCOUNTER — TELEPHONE (OUTPATIENT)
Dept: NEUROLOGY | Facility: CLINIC | Age: 37
End: 2022-10-07

## 2022-10-07 DIAGNOSIS — G35 MULTIPLE SCLEROSIS (H): Primary | ICD-10-CM

## 2022-10-07 NOTE — TELEPHONE ENCOUNTER
VMM left, asked pt to call back. Would like to check in with pt regarding new MS medication and to see if he still has questions. Dimethyl fumarate was approved and rx was sent to [facility] on 9/28.    Josi Little RN

## 2022-10-11 NOTE — TELEPHONE ENCOUNTER
Received final FERNANDO virus result from [facility]. Result is negative. Spoke with Darin. He started taking dimethyl fumarate on 10/6. Is still on the initial lower dose. So far tolerating well. Discussed that with FERNANDO virus negative, Tysabri would be an option. Darin made it clear that he wanted to start with dimethyl fumarate.     States symptoms reported earlier are improved, but vary from day to day.    Darin also asked if it would make sense to get another MRI before the end of the year. Per Dr Phelps ok to get new baseline MRI when we know pt is tolerating the new medication. Would get MRI in December of this year, but would still be due for another MRI next year. MRI brain entered per verbal order from Dr Phelps and pt informed.    Josi Little RN

## 2022-10-17 ENCOUNTER — MYC REFILL (OUTPATIENT)
Dept: NEUROLOGY | Facility: CLINIC | Age: 37
End: 2022-10-17

## 2022-10-17 DIAGNOSIS — G35 MULTIPLE SCLEROSIS (H): ICD-10-CM

## 2022-10-17 RX ORDER — DEXTROAMPHETAMINE SACCHARATE, AMPHETAMINE ASPARTATE, DEXTROAMPHETAMINE SULFATE AND AMPHETAMINE SULFATE 2.5; 2.5; 2.5; 2.5 MG/1; MG/1; MG/1; MG/1
10 TABLET ORAL DAILY
Qty: 30 TABLET | Refills: 0 | Status: SHIPPED | OUTPATIENT
Start: 2022-10-17 | End: 2022-11-21

## 2022-10-17 RX ORDER — DEXTROAMPHETAMINE SACCHARATE, AMPHETAMINE ASPARTATE MONOHYDRATE, DEXTROAMPHETAMINE SULFATE AND AMPHETAMINE SULFATE 5; 5; 5; 5 MG/1; MG/1; MG/1; MG/1
20 CAPSULE, EXTENDED RELEASE ORAL DAILY
Qty: 30 CAPSULE | Refills: 0 | Status: SHIPPED | OUTPATIENT
Start: 2022-10-17 | End: 2022-11-21

## 2022-10-17 NOTE — TELEPHONE ENCOUNTER
Patient requesting refill of their Adderall 10 mg and Adderall XR 20 mg; Patient was last seen in August and has follow up appointment in August of next year with Dr Phelps. Pended rx to Dr Phelps for signature and will send electronically to the pharmacy once signed.    Josi Little RN

## 2022-10-20 DIAGNOSIS — G35 MULTIPLE SCLEROSIS (H): ICD-10-CM

## 2022-10-20 DIAGNOSIS — R52 PAIN: ICD-10-CM

## 2022-10-20 RX ORDER — GABAPENTIN 300 MG/1
CAPSULE ORAL
Qty: 120 CAPSULE | Refills: 11 | Status: SHIPPED | OUTPATIENT
Start: 2022-10-20 | End: 2023-11-14

## 2022-10-20 NOTE — TELEPHONE ENCOUNTER
Received refill request for gabapentin from [facility]; Patient was last seen in August 2022 and has follow up appointment in August 2023 with Dr Phelps. Refilled per MS refill protocol.    Josi Little RN

## 2022-11-21 ENCOUNTER — MYC REFILL (OUTPATIENT)
Dept: NEUROLOGY | Facility: CLINIC | Age: 37
End: 2022-11-21

## 2022-11-21 DIAGNOSIS — G35 MULTIPLE SCLEROSIS (H): ICD-10-CM

## 2022-11-21 RX ORDER — DEXTROAMPHETAMINE SACCHARATE, AMPHETAMINE ASPARTATE MONOHYDRATE, DEXTROAMPHETAMINE SULFATE AND AMPHETAMINE SULFATE 5; 5; 5; 5 MG/1; MG/1; MG/1; MG/1
20 CAPSULE, EXTENDED RELEASE ORAL DAILY
Qty: 30 CAPSULE | Refills: 0 | Status: SHIPPED | OUTPATIENT
Start: 2022-11-21 | End: 2023-01-03

## 2022-11-21 RX ORDER — METHYLPREDNISOLONE 4 MG
TABLET, DOSE PACK ORAL
Qty: 21 TABLET | Refills: 0 | Status: CANCELLED | OUTPATIENT
Start: 2022-11-21

## 2022-11-21 RX ORDER — DEXTROAMPHETAMINE SACCHARATE, AMPHETAMINE ASPARTATE, DEXTROAMPHETAMINE SULFATE AND AMPHETAMINE SULFATE 2.5; 2.5; 2.5; 2.5 MG/1; MG/1; MG/1; MG/1
10 TABLET ORAL DAILY
Qty: 30 TABLET | Refills: 0 | Status: SHIPPED | OUTPATIENT
Start: 2022-11-21 | End: 2023-01-03

## 2023-01-03 ENCOUNTER — MYC REFILL (OUTPATIENT)
Dept: NEUROLOGY | Facility: CLINIC | Age: 38
End: 2023-01-03

## 2023-01-03 DIAGNOSIS — G35 MULTIPLE SCLEROSIS (H): ICD-10-CM

## 2023-01-03 RX ORDER — DEXTROAMPHETAMINE SACCHARATE, AMPHETAMINE ASPARTATE, DEXTROAMPHETAMINE SULFATE AND AMPHETAMINE SULFATE 2.5; 2.5; 2.5; 2.5 MG/1; MG/1; MG/1; MG/1
10 TABLET ORAL DAILY
Qty: 30 TABLET | Refills: 0 | Status: SHIPPED | OUTPATIENT
Start: 2023-01-03 | End: 2023-02-13

## 2023-01-03 RX ORDER — DEXTROAMPHETAMINE SACCHARATE, AMPHETAMINE ASPARTATE MONOHYDRATE, DEXTROAMPHETAMINE SULFATE AND AMPHETAMINE SULFATE 5; 5; 5; 5 MG/1; MG/1; MG/1; MG/1
20 CAPSULE, EXTENDED RELEASE ORAL DAILY
Qty: 30 CAPSULE | Refills: 0 | Status: SHIPPED | OUTPATIENT
Start: 2023-01-03 | End: 2023-02-13

## 2023-01-03 NOTE — TELEPHONE ENCOUNTER
Patient requesting refill of their Adderall 10 mg and Adderall XR 20 mg; Patient was last seen in August 2022 and has follow up appointment in August 2023 with Dr Phelps. Pended rx to Dr Leyva for signature since Dr Phelps is currently out of the clinic until 1/11.    Josi Little RN       no

## 2023-01-04 NOTE — TELEPHONE ENCOUNTER
Approved refill for Adderall XR 20 mg, quantity 30 with no refills and Adderall 10 mg, quantity 30, with no refills for this patient of Dr. Phelps as he is away from the clinic and unavailable to sign prescriptions.

## 2023-01-30 ENCOUNTER — MYC MEDICAL ADVICE (OUTPATIENT)
Dept: NEUROSURGERY | Facility: CLINIC | Age: 38
End: 2023-01-30
Payer: COMMERCIAL

## 2023-01-30 DIAGNOSIS — G35 MULTIPLE SCLEROSIS (H): ICD-10-CM

## 2023-01-30 NOTE — TELEPHONE ENCOUNTER
Attn: Elizabeth Ramirez CNP   MS patient requesting your input, please provide patient with contact for MS. Shahla Estrada LPN  Neurosurgery

## 2023-01-30 NOTE — LETTER
2023      [facility] Department      RE: Darin Maier   : 1985    Member ID [id number]        To Whom It May Concern:    I am writing on behalf of my patient, Darin Maier, to document the medical necessity of Rebif for the treatment of multiple sclerosis.      You have previously denied Rebif, stating that Mr. Maier must try and fail Avonex or Plegridy.  Mr. Maier has already tried and failed Plegridy.  He had new active lesions on MRI in 2022 while on Plegridy.  Thus, expecting a lower efficacy drug like Avonex to be effective in controlling Mr. Maier's disease is not rational. Making Mr. Maier try and fail Avonex is medically inappropriate as there is a proven dose-related effect such that higher dose interferon (like Rebif) is more effective at controlling MS than lower dose interferon (like Avonex).    I will remind you that Rebif is a FDA-approved treatment for multiple sclerosis.  There is no medical or safety basis as to why you are denying coverage of Rebif. In order to provide Mr. Maier the best chance of maintaining his ability to be a functioning, contributing member of society, I urge you to cover Rebif as soon as possible.  Please contact my office with any questions.      Sincerely,        Christian Phelps MD  [facility]   [address] [place] [address]  Phone: [phone]  Fax: [phone]

## 2023-02-01 NOTE — TELEPHONE ENCOUNTER
He already had breakthrough disease activity on intermediate-dose interferon beta (Reji, new active MRI lesions August 2022) and high-dose interferon beta (Rebif, optic neuritis in 2009).  So expecting LOW-dose interferon beta (Avonex) to control his MS is crazy.  There is proven dose-related effect such that higher dose interferon is more effective at controlling MS than lower dose.  I would prefer he be on one of the options I laid out in my telephone note 9/28/22, but putting him on Avonex is absolutely medically inappropriate, and we should appeal that.

## 2023-02-08 NOTE — CONFIDENTIAL NOTE
Rebif denied a second time.  Denial letters faxed to [employer] for pursuit of PAP.    Teresa Chinchilla RN

## 2023-02-13 ENCOUNTER — MYC REFILL (OUTPATIENT)
Dept: NEUROLOGY | Facility: CLINIC | Age: 38
End: 2023-02-13
Payer: COMMERCIAL

## 2023-02-13 DIAGNOSIS — G35 MULTIPLE SCLEROSIS (H): ICD-10-CM

## 2023-02-13 RX ORDER — DEXTROAMPHETAMINE SACCHARATE, AMPHETAMINE ASPARTATE, DEXTROAMPHETAMINE SULFATE AND AMPHETAMINE SULFATE 2.5; 2.5; 2.5; 2.5 MG/1; MG/1; MG/1; MG/1
10 TABLET ORAL DAILY
Qty: 30 TABLET | Refills: 0 | Status: SHIPPED | OUTPATIENT
Start: 2023-02-13 | End: 2023-04-10

## 2023-02-14 NOTE — TELEPHONE ENCOUNTER
Patient requesting refill of their Adderall; Patient was last seen in August 2022 and has follow up appointment in August 2023 with Dr Phelps. Pended rx to Dr Phelps for signature and will send electronically to the pharmacy once signed.    Josi Little RN

## 2023-02-15 RX ORDER — DEXTROAMPHETAMINE SACCHARATE, AMPHETAMINE ASPARTATE MONOHYDRATE, DEXTROAMPHETAMINE SULFATE AND AMPHETAMINE SULFATE 5; 5; 5; 5 MG/1; MG/1; MG/1; MG/1
20 CAPSULE, EXTENDED RELEASE ORAL DAILY
Qty: 30 CAPSULE | Refills: 0 | Status: SHIPPED | OUTPATIENT
Start: 2023-02-15 | End: 2023-03-23

## 2023-02-16 NOTE — CONFIDENTIAL NOTE
Spoke with [facility] [employer]. They will be faxing over a Rebif Rx that needs to be filled out and sent back to [facility] [employer].  This RX is needed to pursue PAP.    Teresa Chinchilla RN

## 2023-02-21 ENCOUNTER — TELEPHONE (OUTPATIENT)
Dept: NEUROLOGY | Facility: CLINIC | Age: 38
End: 2023-02-21
Payer: COMMERCIAL

## 2023-02-22 NOTE — TELEPHONE ENCOUNTER
Called [facility] to inquire about status of Plegridy. Pharmacy was waiting to hear back from pt regarding copay assistance. I called [employer]. [employer] confirmed pt is enrolled in copay assistance as of today. Provided copay assistance information to [facility] pharmacy.    Member ID [id number]  Group [id number]  [facility] [id number]  PCN [id number]    [facility] will reach out to the pt.    Josi Little RN     [phone]

## 2023-03-23 ENCOUNTER — MYC REFILL (OUTPATIENT)
Dept: NEUROLOGY | Facility: CLINIC | Age: 38
End: 2023-03-23
Payer: COMMERCIAL

## 2023-03-23 DIAGNOSIS — G35 MULTIPLE SCLEROSIS (H): ICD-10-CM

## 2023-03-23 RX ORDER — DEXTROAMPHETAMINE SACCHARATE, AMPHETAMINE ASPARTATE MONOHYDRATE, DEXTROAMPHETAMINE SULFATE AND AMPHETAMINE SULFATE 5; 5; 5; 5 MG/1; MG/1; MG/1; MG/1
20 CAPSULE, EXTENDED RELEASE ORAL DAILY
Qty: 30 CAPSULE | Refills: 0 | Status: SHIPPED | OUTPATIENT
Start: 2023-03-23 | End: 2023-04-10

## 2023-03-23 NOTE — TELEPHONE ENCOUNTER
Patient requesting refill of their Adderall XR 20 mg; Patient was last seen in August 2022 and has follow up appointment in August 2023 with Dr Phelps. Pended rx to Dr Phelps for signature and will send electronically to the pharmacy once signed.    Josi Little RN

## 2023-04-05 ENCOUNTER — DOCUMENTATION ONLY (OUTPATIENT)
Dept: NEUROLOGY | Facility: CLINIC | Age: 38
End: 2023-04-05
Payer: COMMERCIAL

## 2023-04-05 NOTE — PROGRESS NOTES
New prescription for Rebif fors received and placed in Dr. Phelps's folder for review and signature.   Carlos Teixeira EMT 04/05/2023 8:44AM

## 2023-04-10 ENCOUNTER — MYC REFILL (OUTPATIENT)
Dept: NEUROSURGERY | Facility: CLINIC | Age: 38
End: 2023-04-10
Payer: COMMERCIAL

## 2023-04-10 DIAGNOSIS — G35 MULTIPLE SCLEROSIS (H): ICD-10-CM

## 2023-04-11 ENCOUNTER — MEDICAL CORRESPONDENCE (OUTPATIENT)
Dept: HEALTH INFORMATION MANAGEMENT | Facility: CLINIC | Age: 38
End: 2023-04-11
Payer: COMMERCIAL

## 2023-04-11 RX ORDER — DEXTROAMPHETAMINE SACCHARATE, AMPHETAMINE ASPARTATE, DEXTROAMPHETAMINE SULFATE AND AMPHETAMINE SULFATE 2.5; 2.5; 2.5; 2.5 MG/1; MG/1; MG/1; MG/1
10 TABLET ORAL DAILY
Qty: 30 TABLET | Refills: 0 | Status: SHIPPED | OUTPATIENT
Start: 2023-04-11 | End: 2023-05-29

## 2023-04-11 RX ORDER — DEXTROAMPHETAMINE SACCHARATE, AMPHETAMINE ASPARTATE MONOHYDRATE, DEXTROAMPHETAMINE SULFATE AND AMPHETAMINE SULFATE 5; 5; 5; 5 MG/1; MG/1; MG/1; MG/1
20 CAPSULE, EXTENDED RELEASE ORAL DAILY
Qty: 30 CAPSULE | Refills: 0 | Status: SHIPPED | OUTPATIENT
Start: 2023-04-11 | End: 2023-04-25

## 2023-04-11 NOTE — TELEPHONE ENCOUNTER
Patient requesting refill of their Adderall 10 mg and Adderall XR 20 mg; Patient was last seen in August 2022 and has follow up appointment in August 2023 with Dr Phelps. Pended rx to Dr Phelps for signature and will send electronically to the pharmacy once signed.    Josi Little RN

## 2023-04-12 DIAGNOSIS — G35 MULTIPLE SCLEROSIS (H): ICD-10-CM

## 2023-04-12 RX ORDER — DEXTROAMPHETAMINE SULFATE, DEXTROAMPHETAMINE SACCHARATE, AMPHETAMINE SULFATE AND AMPHETAMINE ASPARTATE 5; 5; 5; 5 MG/1; MG/1; MG/1; MG/1
CAPSULE, EXTENDED RELEASE ORAL
Qty: 30 CAPSULE | Refills: 0 | OUTPATIENT
Start: 2023-04-12

## 2023-04-22 ENCOUNTER — HEALTH MAINTENANCE LETTER (OUTPATIENT)
Age: 38
End: 2023-04-22

## 2023-04-25 ENCOUNTER — MYC REFILL (OUTPATIENT)
Dept: NEUROSURGERY | Facility: CLINIC | Age: 38
End: 2023-04-25
Payer: COMMERCIAL

## 2023-04-25 DIAGNOSIS — G35 MULTIPLE SCLEROSIS (H): ICD-10-CM

## 2023-04-26 RX ORDER — DEXTROAMPHETAMINE SACCHARATE, AMPHETAMINE ASPARTATE MONOHYDRATE, DEXTROAMPHETAMINE SULFATE AND AMPHETAMINE SULFATE 5; 5; 5; 5 MG/1; MG/1; MG/1; MG/1
20 CAPSULE, EXTENDED RELEASE ORAL DAILY
Qty: 30 CAPSULE | Refills: 0 | Status: SHIPPED | OUTPATIENT
Start: 2023-04-26 | End: 2023-05-29

## 2023-04-26 NOTE — TELEPHONE ENCOUNTER
Patient requesting refill of their Adderall 20 mg; Patient was last seen in Aug 2022 and has follow up appointment in Aug 2023 with Dr Phelps. Pended rx to Dr Phelps for signature and will send electronically to the pharmacy once signed.    Josi Little RN

## 2023-05-29 ENCOUNTER — MYC REFILL (OUTPATIENT)
Dept: NEUROSURGERY | Facility: CLINIC | Age: 38
End: 2023-05-29
Payer: COMMERCIAL

## 2023-05-29 DIAGNOSIS — G35 MULTIPLE SCLEROSIS (H): ICD-10-CM

## 2023-05-30 NOTE — TELEPHONE ENCOUNTER
Patient requesting refill of their Adderall XR 20 mg and Adderall 10 mg; Patient was last seen in Aug 2022 and has follow up appointment in Aug 2023 with Dr Phelps. Pended rx to Dr Phelps for signature and will send electronically to the pharmacy once signed.    Josi Little RN

## 2023-05-31 RX ORDER — DEXTROAMPHETAMINE SACCHARATE, AMPHETAMINE ASPARTATE MONOHYDRATE, DEXTROAMPHETAMINE SULFATE AND AMPHETAMINE SULFATE 5; 5; 5; 5 MG/1; MG/1; MG/1; MG/1
20 CAPSULE, EXTENDED RELEASE ORAL DAILY
Qty: 30 CAPSULE | Refills: 0 | Status: SHIPPED | OUTPATIENT
Start: 2023-05-31 | End: 2023-06-26

## 2023-05-31 RX ORDER — DEXTROAMPHETAMINE SACCHARATE, AMPHETAMINE ASPARTATE, DEXTROAMPHETAMINE SULFATE AND AMPHETAMINE SULFATE 2.5; 2.5; 2.5; 2.5 MG/1; MG/1; MG/1; MG/1
10 TABLET ORAL DAILY
Qty: 30 TABLET | Refills: 0 | Status: SHIPPED | OUTPATIENT
Start: 2023-05-31 | End: 2023-06-26

## 2023-06-26 ENCOUNTER — MYC REFILL (OUTPATIENT)
Dept: NEUROSURGERY | Facility: CLINIC | Age: 38
End: 2023-06-26
Payer: COMMERCIAL

## 2023-06-26 DIAGNOSIS — G35 MULTIPLE SCLEROSIS (H): ICD-10-CM

## 2023-06-26 RX ORDER — DEXTROAMPHETAMINE SACCHARATE, AMPHETAMINE ASPARTATE MONOHYDRATE, DEXTROAMPHETAMINE SULFATE AND AMPHETAMINE SULFATE 5; 5; 5; 5 MG/1; MG/1; MG/1; MG/1
20 CAPSULE, EXTENDED RELEASE ORAL DAILY
Qty: 30 CAPSULE | Refills: 0 | Status: SHIPPED | OUTPATIENT
Start: 2023-06-26 | End: 2023-07-23

## 2023-06-26 RX ORDER — DEXTROAMPHETAMINE SACCHARATE, AMPHETAMINE ASPARTATE, DEXTROAMPHETAMINE SULFATE AND AMPHETAMINE SULFATE 2.5; 2.5; 2.5; 2.5 MG/1; MG/1; MG/1; MG/1
10 TABLET ORAL DAILY
Qty: 30 TABLET | Refills: 0 | Status: SHIPPED | OUTPATIENT
Start: 2023-06-26 | End: 2023-07-23

## 2023-06-26 NOTE — TELEPHONE ENCOUNTER
Patient requesting refill of their Adderall 10 mg and Adderall XR 20 mg; Patient was last seen in Aug 2022 and has follow up appointment in Aug 2023 with Dr Phelps. Pended rx to Dr Phelps for signature and will send electronically to the pharmacy once signed.    Josi Little RN

## 2023-07-23 ENCOUNTER — MYC REFILL (OUTPATIENT)
Dept: NEUROSURGERY | Facility: CLINIC | Age: 38
End: 2023-07-23
Payer: COMMERCIAL

## 2023-07-23 DIAGNOSIS — G35 MULTIPLE SCLEROSIS (H): ICD-10-CM

## 2023-07-24 RX ORDER — DEXTROAMPHETAMINE SACCHARATE, AMPHETAMINE ASPARTATE, DEXTROAMPHETAMINE SULFATE AND AMPHETAMINE SULFATE 2.5; 2.5; 2.5; 2.5 MG/1; MG/1; MG/1; MG/1
10 TABLET ORAL DAILY
Qty: 30 TABLET | Refills: 0 | Status: SHIPPED | OUTPATIENT
Start: 2023-07-24 | End: 2023-08-04

## 2023-07-24 RX ORDER — DEXTROAMPHETAMINE SACCHARATE, AMPHETAMINE ASPARTATE MONOHYDRATE, DEXTROAMPHETAMINE SULFATE AND AMPHETAMINE SULFATE 5; 5; 5; 5 MG/1; MG/1; MG/1; MG/1
20 CAPSULE, EXTENDED RELEASE ORAL DAILY
Qty: 30 CAPSULE | Refills: 0 | Status: SHIPPED | OUTPATIENT
Start: 2023-07-24 | End: 2023-09-01

## 2023-07-24 NOTE — CONFIDENTIAL NOTE
Patient requesting refill of their Adderall; Patient was last seen in August and has follow up appointment in September with Dr. Phelps. Pended rx to Dr. Phelps for signature and will send electronically to the pharmacy once signed.    Teresa Chinchilla RN

## 2023-07-25 DIAGNOSIS — G35 MULTIPLE SCLEROSIS (H): ICD-10-CM

## 2023-07-26 RX ORDER — DEXTROAMPHETAMINE SACCHARATE, AMPHETAMINE ASPARTATE MONOHYDRATE, DEXTROAMPHETAMINE SULFATE AND AMPHETAMINE SULFATE 5; 5; 5; 5 MG/1; MG/1; MG/1; MG/1
20 CAPSULE, EXTENDED RELEASE ORAL DAILY
Qty: 30 CAPSULE | Refills: 0 | OUTPATIENT
Start: 2023-07-26

## 2023-08-04 DIAGNOSIS — G35 MULTIPLE SCLEROSIS (H): ICD-10-CM

## 2023-08-04 RX ORDER — DEXTROAMPHETAMINE SACCHARATE, AMPHETAMINE ASPARTATE, DEXTROAMPHETAMINE SULFATE AND AMPHETAMINE SULFATE 2.5; 2.5; 2.5; 2.5 MG/1; MG/1; MG/1; MG/1
10 TABLET ORAL DAILY
Qty: 30 TABLET | Refills: 0 | Status: SHIPPED | OUTPATIENT
Start: 2023-08-04 | End: 2023-09-01

## 2023-08-04 NOTE — TELEPHONE ENCOUNTER
Pt reports he was only able to get 10 count of Adderall 10 mg (rx sent 7/24) and is in need of refill now. Called pharmacy; they confirmed only 10 tablets were picked up. New rx pended to Dr Phelps for signature.    Pt last seen Aug 2022 and has follow-up Sep 2023 with Dr Phelps.    Josi Little RN

## 2023-08-08 ENCOUNTER — DOCUMENTATION ONLY (OUTPATIENT)
Dept: NEUROLOGY | Facility: CLINIC | Age: 38
End: 2023-08-08
Payer: COMMERCIAL

## 2023-08-08 NOTE — PROGRESS NOTES
Rebif prescription form has been filled out and signed. Form has been faxed back at [phone].  Carlos GARDNER 08/08/2023 12:42PM

## 2023-08-25 DIAGNOSIS — K21.9 GASTROESOPHAGEAL REFLUX DISEASE WITHOUT ESOPHAGITIS: ICD-10-CM

## 2023-08-28 NOTE — TELEPHONE ENCOUNTER
Received refill request for omeprazole from [facility]; Patient was last seen in Aug 2022 and has follow up appointment in Sep 2023 with Dr Phelps. Refilled per MS refill protocol.    Josi Little RN

## 2023-09-01 ENCOUNTER — MYC REFILL (OUTPATIENT)
Dept: NEUROSURGERY | Facility: CLINIC | Age: 38
End: 2023-09-01
Payer: COMMERCIAL

## 2023-09-01 DIAGNOSIS — G35 MULTIPLE SCLEROSIS (H): ICD-10-CM

## 2023-09-01 RX ORDER — DEXTROAMPHETAMINE SACCHARATE, AMPHETAMINE ASPARTATE, DEXTROAMPHETAMINE SULFATE AND AMPHETAMINE SULFATE 2.5; 2.5; 2.5; 2.5 MG/1; MG/1; MG/1; MG/1
10 TABLET ORAL DAILY
Qty: 30 TABLET | Refills: 0 | Status: SHIPPED | OUTPATIENT
Start: 2023-09-01 | End: 2023-09-28

## 2023-09-01 RX ORDER — DEXTROAMPHETAMINE SACCHARATE, AMPHETAMINE ASPARTATE MONOHYDRATE, DEXTROAMPHETAMINE SULFATE AND AMPHETAMINE SULFATE 5; 5; 5; 5 MG/1; MG/1; MG/1; MG/1
20 CAPSULE, EXTENDED RELEASE ORAL DAILY
Qty: 30 CAPSULE | Refills: 0 | Status: SHIPPED | OUTPATIENT
Start: 2023-09-01 | End: 2023-09-28

## 2023-09-01 NOTE — TELEPHONE ENCOUNTER
Patient requesting refill of their Adderall 10 mg and Adderall XR 20 mg; Patient was last seen in Aug 2022 and has follow up appointment in Sep 2023 with Dr Phelps. Pended rx to Dr Phelps for signature and will send electronically to the pharmacy once signed.    Josi Little RN

## 2023-09-05 ENCOUNTER — OFFICE VISIT (OUTPATIENT)
Dept: NEUROLOGY | Facility: CLINIC | Age: 38
End: 2023-09-05
Attending: PSYCHIATRY & NEUROLOGY
Payer: COMMERCIAL

## 2023-09-05 ENCOUNTER — DOCUMENTATION ONLY (OUTPATIENT)
Dept: NEUROLOGY | Facility: CLINIC | Age: 38
End: 2023-09-05
Payer: COMMERCIAL

## 2023-09-05 VITALS
OXYGEN SATURATION: 97 % | BODY MASS INDEX: 28.19 KG/M2 | WEIGHT: 213.7 LBS | SYSTOLIC BLOOD PRESSURE: 130 MMHG | HEART RATE: 94 BPM | DIASTOLIC BLOOD PRESSURE: 88 MMHG

## 2023-09-05 DIAGNOSIS — G35 MULTIPLE SCLEROSIS (H): ICD-10-CM

## 2023-09-05 DIAGNOSIS — H46.9 OPTIC NEURITIS: ICD-10-CM

## 2023-09-05 DIAGNOSIS — J30.2 SEASONAL ALLERGIC RHINITIS, UNSPECIFIED TRIGGER: Primary | ICD-10-CM

## 2023-09-05 PROCEDURE — G0463 HOSPITAL OUTPT CLINIC VISIT: HCPCS | Performed by: PSYCHIATRY & NEUROLOGY

## 2023-09-05 PROCEDURE — 99215 OFFICE O/P EST HI 40 MIN: CPT | Performed by: PSYCHIATRY & NEUROLOGY

## 2023-09-05 RX ORDER — CLEMASTINE FUMARATE 2.68 MG
2 TABLET ORAL 2 TIMES DAILY
Qty: 120 TABLET | Refills: 3 | Status: SHIPPED | OUTPATIENT
Start: 2023-09-05 | End: 2024-10-02

## 2023-09-05 ASSESSMENT — PAIN SCALES - GENERAL: PAINLEVEL: NO PAIN (0)

## 2023-09-05 NOTE — Clinical Note
"9/5/2023       RE: Darin Maier  [address] [facility] [address]     Dear Colleague,    Thank you for referring your patient, Darin Maire, to the [facility] [place] at [facility]. Please see a copy of my visit note below.    ID: Rodrigo Maier is a 38-year-old male for follow-up for multiple sclerosis.  He returns for annual follow-up.    HPI:  Darin had about joint pain that was very severe.  He saw rheumatology, was HLA-B27 positive.  His otologist considering treatment with methotrexate and/or sulfasalazine but he says the pain \"just vanished\" and he has not had further follow-up for that.  He is at Re[facility] and tolerating it okay.  He had not switched to Plegridy because of insurance issues but had new and enhancing lesions on MRI.  I suggested a variety of options including natalizumab or B-cell depletion, because of a prior clinical relapse while on the interferon.  However he was very reluctant to do infusion therapy.  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  Mainly this is poor vision in dim light, when he is trying to hunt at dusk.  He asked if there is anything to be done that might improve his vision, and I discussed the [facility] trial of clemastine in patients with prior optic neuritis.  This showed an improvement in both visual potentials and low contrast visual acuity in people taking clemastine versus placebo.      Current Outpatient Medications:      amphetamine-dextroamphetamine (ADDERALL XR) 20 MG 24 hr capsule, Take 1 capsule (20 mg) by mouth daily, Disp: 30 capsule, Rfl: 0     amphetamine-dextroamphetamine (ADDERALL) 10 MG tablet, Take 1 tablet (10 mg) by mouth daily, Disp: 30 tablet, Rfl: 0     Clemastine Fumarate 2.68 MG TABS, Take 2 tablets by mouth 2 times daily, Disp: 120 tablet, Rfl: 3     gabapentin (NEURONTIN) 300 MG capsule, TAKE " 1 - 4 CAPSULES BY MOUTH AT BEDTIME, Disp: 120 capsule, Rfl: 11     naproxen sodium (ANAPROX) 220 MG tablet, Take 2 tablets by mouth daily., Disp: , Rfl:      omeprazole (PRILOSEC) 20 MG DR capsule, daily, Disp: , Rfl: 2     REBIF 44 MCG/0.5ML injection, INJECT THE CONTENTS OF ONE SYRINGE (44MCG) UNDER THE SKIN THREE TIMES A WEEK., Disp: 6 mL, Rfl: 11     senna-docusate (SENOKOT-S/PERICOLACE) 8.6-50 MG tablet, Take 1 tablet by mouth daily, Disp: 30 tablet, Rfl: 11     dimethyl fumarate (TECFIDERA) 120 MG CPDR, Take 1 capsule (120 mg) by mouth 2 times daily (Patient not taking: Reported on 9/5/2023), Disp: 14 capsule, Rfl: 0     dimethyl fumarate 240 MG CPDR, Take 1 capsule (240 mg) by mouth 2 times daily (Patient not taking: Reported on 9/5/2023), Disp: 180 capsule, Rfl: 3     famotidine (PEPCID) 40 MG tablet, Take 1 tablet (40 mg) by mouth daily (Patient not taking: Reported on 6/2/2020), Disp: 30 tablet, Rfl: 0     methylPREDNISolone (MEDROL DOSEPAK) 4 MG tablet therapy pack, Follow Package Directions (Patient not taking: Reported on 9/5/2023), Disp: 21 tablet, Rfl: 0     methylPREDNISolone (MEDROL DOSEPAK) 4 MG tablet therapy pack, Follow Package Directions (Patient not taking: Reported on 9/5/2023), Disp: 21 tablet, Rfl: 0     nortriptyline (PAMELOR) 50 MG capsule, Take 1 capsule (50 mg) by mouth At Bedtime (Patient not taking: Reported on 9/5/2023), Disp: 30 capsule, Rfl: 11     omeprazole (PRILOSEC) 20 MG DR capsule, Take 1 capsule (20 mg) by mouth daily (Patient not taking: Reported on 9/5/2023), Disp: 90 capsule, Rfl: 0     peginterferon beta-1a (PLEGRIDY STARTER PACK) 63 & 94 MCG/0.5ML prefilled pen starter kit, Inject 63 mcg dose subcutaneously on day 1 and inject 94 mcg dose subcutaneously on day 15 (Patient not taking: Reported on 9/5/2023), Disp: 1 kit, Rfl: 0     peginterferon beta-1a (PLEGRIDY) 125 MCG/0.5ML SOPN prefilled pen, Inject 0.5 mLs (125 mcg) Subcutaneous every 14 days (Patient not taking:  Reported on 9/5/2023), Disp: 3 kit, Rfl: 11     Exam: He is alert and oriented.  Affect is bright and lung functions are normal.  Cranial nerves are notable only for left optic disc pallor.  Full contrast acuity is 20/20 on the left and he misses 1 color plates in the left eye.  Muscle bulk tone strength dexterity are normal in the arms and legs.  Light touch is intact in all 4 limbs.  Finger tapping toe tapping and finger-nose-finger are normal.  Reflexes are normal and symmetric and gait is narrow based and stable.    Impression: Relapsing remitting multiple sclerosis, clinically stable on interferon beta-1a.  I spent 23 minutes on his care on the day of service including chart review and face-to-face time.  We discussed that clemastine, including side effects such as worsening fatigue.  He does have seasonal allergies and may help with that as well.  Discussed MRI surveillance.    Plan: Clemastine 5.36 mg twice a day.  MRI in 11 months at [facility].  Follow-up in 1 year.      Again, thank you for allowing me to participate in the care of your patient.      Sincerely,    Christian Phelps MD

## 2023-09-05 NOTE — NURSING NOTE
Chief Complaint   Patient presents with    MS    RECHECK     Ms follow up      Vitals were taken and medications were reconciled.   Carlos Teixeira, EMT  1:35 PM\

## 2023-09-11 NOTE — PROGRESS NOTES
"ID: Rodrigo Maier is a 38-year-old male for follow-up for multiple sclerosis.  He returns for annual follow-up.    HPI:  Darin had about joint pain that was very severe.  He saw rheumatology, was HLA-B27 positive.  His otologist considering treatment with methotrexate and/or sulfasalazine but he says the pain \"just vanished\" and he has not had further follow-up for that.  He is at Rebif and tolerating it okay.  He had not switched to Plegridy because of insurance issues but had new and enhancing lesions on MRI.  I suggested a variety of options including natalizumab or B-cell depletion, because of a prior clinical relapse while on the interferon.  However he was very reluctant to do infusion therapy.  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  Mainly this is poor vision in dim light, when he is trying to hunt at dusk.  He asked if there is anything to be done that might improve his vision, and I discussed the [facility] trial of clemastine in patients with prior optic neuritis.  This showed an improvement in both visual potentials and low contrast visual acuity in people taking clemastine versus placebo.      Current Outpatient Medications:      amphetamine-dextroamphetamine (ADDERALL XR) 20 MG 24 hr capsule, Take 1 capsule (20 mg) by mouth daily, Disp: 30 capsule, Rfl: 0     amphetamine-dextroamphetamine (ADDERALL) 10 MG tablet, Take 1 tablet (10 mg) by mouth daily, Disp: 30 tablet, Rfl: 0     Clemastine Fumarate 2.68 MG TABS, Take 2 tablets by mouth 2 times daily, Disp: 120 tablet, Rfl: 3     gabapentin (NEURONTIN) 300 MG capsule, TAKE 1 - 4 CAPSULES BY MOUTH AT BEDTIME, Disp: 120 capsule, Rfl: 11     naproxen sodium (ANAPROX) 220 MG tablet, Take 2 tablets by mouth daily., Disp: , Rfl:      omeprazole (PRILOSEC) 20 MG DR capsule, daily, Disp: , Rfl: 2     REBIF 44 MCG/0.5ML injection, INJECT THE CONTENTS OF ONE SYRINGE (44MCG) UNDER THE SKIN THREE TIMES A WEEK., " Disp: 6 mL, Rfl: 11     senna-docusate (SENOKOT-S/PERICOLACE) 8.6-50 MG tablet, Take 1 tablet by mouth daily, Disp: 30 tablet, Rfl: 11     dimethyl fumarate (TECFIDERA) 120 MG CPDR, Take 1 capsule (120 mg) by mouth 2 times daily (Patient not taking: Reported on 9/5/2023), Disp: 14 capsule, Rfl: 0     dimethyl fumarate 240 MG CPDR, Take 1 capsule (240 mg) by mouth 2 times daily (Patient not taking: Reported on 9/5/2023), Disp: 180 capsule, Rfl: 3     famotidine (PEPCID) 40 MG tablet, Take 1 tablet (40 mg) by mouth daily (Patient not taking: Reported on 6/2/2020), Disp: 30 tablet, Rfl: 0     methylPREDNISolone (MEDROL DOSEPAK) 4 MG tablet therapy pack, Follow Package Directions (Patient not taking: Reported on 9/5/2023), Disp: 21 tablet, Rfl: 0     methylPREDNISolone (MEDROL DOSEPAK) 4 MG tablet therapy pack, Follow Package Directions (Patient not taking: Reported on 9/5/2023), Disp: 21 tablet, Rfl: 0     nortriptyline (PAMELOR) 50 MG capsule, Take 1 capsule (50 mg) by mouth At Bedtime (Patient not taking: Reported on 9/5/2023), Disp: 30 capsule, Rfl: 11     omeprazole (PRILOSEC) 20 MG DR capsule, Take 1 capsule (20 mg) by mouth daily (Patient not taking: Reported on 9/5/2023), Disp: 90 capsule, Rfl: 0     peginterferon beta-1a (PLEGRIDY STARTER PACK) 63 & 94 MCG/0.5ML prefilled pen starter kit, Inject 63 mcg dose subcutaneously on day 1 and inject 94 mcg dose subcutaneously on day 15 (Patient not taking: Reported on 9/5/2023), Disp: 1 kit, Rfl: 0     peginterferon beta-1a (PLEGRIDY) 125 MCG/0.5ML SOPN prefilled pen, Inject 0.5 mLs (125 mcg) Subcutaneous every 14 days (Patient not taking: Reported on 9/5/2023), Disp: 3 kit, Rfl: 11     Exam: He is alert and oriented.  Affect is bright and lung functions are normal.  Cranial nerves are notable only for left optic disc pallor.  Full contrast acuity is 20/20 on the left and he misses 1 color plates in the left eye.  Muscle bulk tone strength dexterity are normal in the  arms and legs.  Light touch is intact in all 4 limbs.  Finger tapping toe tapping and finger-nose-finger are normal.  Reflexes are normal and symmetric and gait is narrow based and stable.    Impression: Relapsing remitting multiple sclerosis, clinically stable on interferon beta-1a.  I spent 23 minutes on his care on the day of service including chart review and face-to-face time.  We discussed that clemastine, including side effects such as worsening fatigue.  He does have seasonal allergies and may help with that as well.  Discussed MRI surveillance.    Plan: Clemastine 5.36 mg twice a day.  MRI in 11 months at [facility].  Follow-up in 1 year.

## 2023-09-20 NOTE — TELEPHONE ENCOUNTER
Received refill request for Gabapentin from [facility]; Patient was last seen on 11/26/2019 and has follow up appointment on 6/2/2020 with Dr Phelps. Pended to [facility] pool for review/approval    Albertina Funez MA         Detail Level: Zone Initiate Treatment: Dapsone gel in the morning Modify Regimen: Increase to 0.05 tretinoin Continue Regimen: Azythromiacin

## 2023-09-28 ENCOUNTER — MYC REFILL (OUTPATIENT)
Dept: NEUROSURGERY | Facility: CLINIC | Age: 38
End: 2023-09-28
Payer: COMMERCIAL

## 2023-09-28 DIAGNOSIS — G35 MULTIPLE SCLEROSIS (H): ICD-10-CM

## 2023-09-28 RX ORDER — DEXTROAMPHETAMINE SACCHARATE, AMPHETAMINE ASPARTATE MONOHYDRATE, DEXTROAMPHETAMINE SULFATE AND AMPHETAMINE SULFATE 5; 5; 5; 5 MG/1; MG/1; MG/1; MG/1
20 CAPSULE, EXTENDED RELEASE ORAL DAILY
Qty: 30 CAPSULE | Refills: 0 | Status: SHIPPED | OUTPATIENT
Start: 2023-09-28 | End: 2023-11-01

## 2023-09-28 RX ORDER — DEXTROAMPHETAMINE SACCHARATE, AMPHETAMINE ASPARTATE, DEXTROAMPHETAMINE SULFATE AND AMPHETAMINE SULFATE 2.5; 2.5; 2.5; 2.5 MG/1; MG/1; MG/1; MG/1
10 TABLET ORAL DAILY
Qty: 30 TABLET | Refills: 0 | Status: SHIPPED | OUTPATIENT
Start: 2023-09-28 | End: 2023-11-01

## 2023-09-28 NOTE — TELEPHONE ENCOUNTER
Patient requesting refill of their Adderall 10 mg and Adderall XR 20 mg; Patient was last seen in Sep 2023 and has follow up appointment in Sep 2024 with Dr Phelps. Pended rx to Dr Phelps for signature and will send electronically to the pharmacy once signed.    Josi Little RN

## 2023-11-01 ENCOUNTER — MYC REFILL (OUTPATIENT)
Dept: NEUROSURGERY | Facility: CLINIC | Age: 38
End: 2023-11-01
Payer: COMMERCIAL

## 2023-11-01 DIAGNOSIS — G35 MULTIPLE SCLEROSIS (H): ICD-10-CM

## 2023-11-01 RX ORDER — DEXTROAMPHETAMINE SACCHARATE, AMPHETAMINE ASPARTATE, DEXTROAMPHETAMINE SULFATE AND AMPHETAMINE SULFATE 2.5; 2.5; 2.5; 2.5 MG/1; MG/1; MG/1; MG/1
10 TABLET ORAL DAILY
Qty: 30 TABLET | Refills: 0 | Status: SHIPPED | OUTPATIENT
Start: 2023-11-01 | End: 2023-12-06

## 2023-11-01 RX ORDER — DEXTROAMPHETAMINE SACCHARATE, AMPHETAMINE ASPARTATE MONOHYDRATE, DEXTROAMPHETAMINE SULFATE AND AMPHETAMINE SULFATE 5; 5; 5; 5 MG/1; MG/1; MG/1; MG/1
20 CAPSULE, EXTENDED RELEASE ORAL DAILY
Qty: 30 CAPSULE | Refills: 0 | Status: SHIPPED | OUTPATIENT
Start: 2023-11-01 | End: 2023-12-06

## 2023-11-01 NOTE — TELEPHONE ENCOUNTER
Patient requesting refill of their Adderall XR 20 mg and Adderall 10 mg; Patient was last seen in Sep 2023 and has follow up appointment in Sep 2024 with Dr Phelps. Pended rx to Dr Phelps for signature and will send electronically to the pharmacy once signed.    Josi Little RN

## 2023-11-14 DIAGNOSIS — R52 PAIN: ICD-10-CM

## 2023-11-14 DIAGNOSIS — G35 MULTIPLE SCLEROSIS (H): ICD-10-CM

## 2023-11-14 RX ORDER — GABAPENTIN 300 MG/1
CAPSULE ORAL
Qty: 120 CAPSULE | Refills: 11 | Status: SHIPPED | OUTPATIENT
Start: 2023-11-14

## 2023-11-14 NOTE — TELEPHONE ENCOUNTER
Received refill request for gabapentin from [facility]; Patient was last seen in Sep 2023 and has follow up appointment in Sep 2024 with Dr Phelps. Refilled per [facility] refill protocol.    Josi Little RN

## 2023-12-06 ENCOUNTER — MYC REFILL (OUTPATIENT)
Dept: NEUROLOGY | Facility: CLINIC | Age: 38
End: 2023-12-06
Payer: COMMERCIAL

## 2023-12-06 DIAGNOSIS — G35 MULTIPLE SCLEROSIS (H): ICD-10-CM

## 2023-12-06 DIAGNOSIS — K21.9 GASTROESOPHAGEAL REFLUX DISEASE WITHOUT ESOPHAGITIS: ICD-10-CM

## 2023-12-06 RX ORDER — DEXTROAMPHETAMINE SACCHARATE, AMPHETAMINE ASPARTATE, DEXTROAMPHETAMINE SULFATE AND AMPHETAMINE SULFATE 2.5; 2.5; 2.5; 2.5 MG/1; MG/1; MG/1; MG/1
10 TABLET ORAL DAILY
Qty: 30 TABLET | Refills: 0 | Status: SHIPPED | OUTPATIENT
Start: 2023-12-06 | End: 2024-01-22

## 2023-12-06 RX ORDER — DEXTROAMPHETAMINE SACCHARATE, AMPHETAMINE ASPARTATE MONOHYDRATE, DEXTROAMPHETAMINE SULFATE AND AMPHETAMINE SULFATE 5; 5; 5; 5 MG/1; MG/1; MG/1; MG/1
20 CAPSULE, EXTENDED RELEASE ORAL DAILY
Qty: 30 CAPSULE | Refills: 0 | Status: SHIPPED | OUTPATIENT
Start: 2023-12-06 | End: 2024-01-22

## 2023-12-06 NOTE — TELEPHONE ENCOUNTER
Patient requesting refill of their Adderall 10 mg, Adderall XR 20 mg, and omeprazole; Patient was last seen in Sep 2023 and has follow up appointment in Sep 2024 with Dr Phelps. Pended rx to Dr Phelps for signature and will send electronically to the pharmacy once signed.    Josi Little RN

## 2023-12-08 DIAGNOSIS — G35 MULTIPLE SCLEROSIS (H): ICD-10-CM

## 2023-12-08 RX ORDER — INTERFERON BETA-1A 44 UG/.5ML
INJECTION, SOLUTION SUBCUTANEOUS
Qty: 6 ML | Refills: 11 | Status: SHIPPED | OUTPATIENT
Start: 2023-12-08

## 2023-12-08 NOTE — TELEPHONE ENCOUNTER
Received refill request for Rebif 44 mcg from [facility]; Patient was last seen in Sep 2023 and has follow up appointment in Sep 2024 with Dr Phelps.     Called [facility], as the pt has previously filled this through [facility] ([facility] informed me that they are the new filling pharmacy for [facility]. Rx sent to [facility] per [facility] refill protocol.    Josi Little RN

## 2023-12-31 ENCOUNTER — APPOINTMENT (OUTPATIENT)
Dept: RADIOLOGY | Facility: CLINIC | Age: 38
End: 2023-12-31
Payer: COMMERCIAL

## 2023-12-31 ENCOUNTER — HOSPITAL ENCOUNTER (EMERGENCY)
Facility: CLINIC | Age: 38
Discharge: HOME | End: 2023-12-31
Attending: EMERGENCY MEDICINE | Admitting: EMERGENCY MEDICINE
Payer: COMMERCIAL

## 2023-12-31 ENCOUNTER — APPOINTMENT (OUTPATIENT)
Dept: RADIOLOGY | Facility: CLINIC | Age: 38
End: 2023-12-31
Attending: EMERGENCY MEDICINE
Payer: COMMERCIAL

## 2023-12-31 VITALS
HEART RATE: 77 BPM | DIASTOLIC BLOOD PRESSURE: 82 MMHG | OXYGEN SATURATION: 100 % | TEMPERATURE: 98 F | BODY MASS INDEX: 28.37 KG/M2 | SYSTOLIC BLOOD PRESSURE: 122 MMHG | WEIGHT: 215 LBS | RESPIRATION RATE: 17 BRPM

## 2023-12-31 DIAGNOSIS — S82.839A CLOSED FRACTURE OF DISTAL END OF FIBULA, UNSPECIFIED FRACTURE MORPHOLOGY, UNSPECIFIED LATERALITY, INITIAL ENCOUNTER: ICD-10-CM

## 2023-12-31 PROCEDURE — 73590 X-RAY EXAM OF LOWER LEG: CPT | Mod: RT

## 2023-12-31 PROCEDURE — 99285 EMERGENCY DEPT VISIT HI MDM: CPT | Mod: 25

## 2023-12-31 PROCEDURE — 96375 TX/PRO/DX INJ NEW DRUG ADDON: CPT

## 2023-12-31 PROCEDURE — 96376 TX/PRO/DX INJ SAME DRUG ADON: CPT

## 2023-12-31 PROCEDURE — 999N000065 XR ANKLE RIGHT G/E 3 VIEWS

## 2023-12-31 PROCEDURE — 250N000011 HC RX IP 250 OP 636: Performed by: EMERGENCY MEDICINE

## 2023-12-31 PROCEDURE — 73610 X-RAY EXAM OF ANKLE: CPT | Mod: RT

## 2023-12-31 PROCEDURE — 96374 THER/PROPH/DIAG INJ IV PUSH: CPT

## 2023-12-31 PROCEDURE — 73630 X-RAY EXAM OF FOOT: CPT | Mod: RT

## 2023-12-31 PROCEDURE — 999N000055 HC STATISTIC END TITIAL CO2 MONITORING

## 2023-12-31 PROCEDURE — 999N000157 HC STATISTIC RCP TIME EA 10 MIN

## 2023-12-31 PROCEDURE — 27788 TREATMENT OF ANKLE FRACTURE: CPT | Mod: RT

## 2023-12-31 PROCEDURE — 250N000011 HC RX IP 250 OP 636

## 2023-12-31 RX ORDER — PROPOFOL 10 MG/ML
1 INJECTION, EMULSION INTRAVENOUS ONCE
Status: COMPLETED | OUTPATIENT
Start: 2023-12-31 | End: 2023-12-31

## 2023-12-31 RX ORDER — ACETAMINOPHEN 500 MG
500 TABLET ORAL EVERY 6 HOURS PRN
Qty: 28 TABLET | Refills: 0 | Status: SHIPPED | OUTPATIENT
Start: 2023-12-31 | End: 2024-01-07

## 2023-12-31 RX ORDER — OXYCODONE HYDROCHLORIDE 5 MG/1
5 TABLET ORAL EVERY 6 HOURS PRN
Qty: 12 TABLET | Refills: 0 | Status: SHIPPED | OUTPATIENT
Start: 2023-12-31 | End: 2024-01-03

## 2023-12-31 RX ORDER — PROPOFOL 10 MG/ML
INJECTION, EMULSION INTRAVENOUS DAILY PRN
Status: COMPLETED | OUTPATIENT
Start: 2023-12-31 | End: 2023-12-31

## 2023-12-31 RX ORDER — PROPOFOL 10 MG/ML
200 INJECTION, EMULSION INTRAVENOUS ONCE
Status: DISCONTINUED | OUTPATIENT
Start: 2023-12-31 | End: 2023-12-31

## 2023-12-31 RX ADMIN — PROPOFOL 50 MG: 10 INJECTION, EMULSION INTRAVENOUS at 20:11

## 2023-12-31 RX ADMIN — HYDROMORPHONE HYDROCHLORIDE 1 MG: 1 INJECTION, SOLUTION INTRAMUSCULAR; INTRAVENOUS; SUBCUTANEOUS at 21:08

## 2023-12-31 RX ADMIN — PROPOFOL 50 MG: 10 INJECTION, EMULSION INTRAVENOUS at 20:08

## 2023-12-31 RX ADMIN — PROPOFOL 50 MG: 10 INJECTION, EMULSION INTRAVENOUS at 20:12

## 2023-12-31 RX ADMIN — PROPOFOL 50 MG: 10 INJECTION, EMULSION INTRAVENOUS at 20:09

## 2023-12-31 RX ADMIN — HYDROMORPHONE HYDROCHLORIDE 1 MG: 1 INJECTION, SOLUTION INTRAMUSCULAR; INTRAVENOUS; SUBCUTANEOUS at 18:57

## 2023-12-31 ASSESSMENT — ENCOUNTER SYMPTOMS
NUMBNESS: 0
FEVER: 0
CHILLS: 0

## 2023-12-31 ASSESSMENT — ACTIVITIES OF DAILY LIVING (ADL)
ADLS_ACUITY_SCORE: 35
ADLS_ACUITY_SCORE: 35

## 2023-12-31 NOTE — Clinical Note
Darin Maier was seen and treated in our emergency department on 12/31/2023.  He may return to work on 01/05/2024.       If you have any questions or concerns, please don't hesitate to call.      Yadiel Obrien MD

## 2024-01-01 NOTE — ED PROVIDER NOTES
"EMERGENCY DEPARTMENT ENCOUNTER      NAME: Darin Maier  AGE: 38 year old male  YOB: 1985  MRN: [id number]  EVALUATION DATE & TIME: 12/31/2023  6:28 PM    PCP: Sheldon Brown    ED PROVIDER: Meseret Schmitt PA-C      Chief Complaint   Patient presents with    Ankle Pain     Right           FINAL IMPRESSION:  1. Closed fracture of distal end of fibula, unspecified fracture morphology, unspecified laterality, initial encounter        ED COURSE & MEDICAL DECISION MAKING:    Pertinent Labs & Imaging studies reviewed. (See chart for details)  38 year old male presents to the Emergency Department for evaluation of ankle pain after playing hockey.  Patient reports that he \"caught an edge and fell\".  Patient reports that he reduced his own ankle as it was going the wrong direction.  Vital signs reviewed patient is hypertensive most likely secondary to pain.  Afebrile.  On exam the right ankle is tender to palpation with decreased range of motion and strength of the right ankle secondary due to pain.  Range of the right lower extremity is nontender to palpation.  Right ankle has overlying edema.  No erythema or warmth.  No lacerations or abrasions.  Normal sensation of the right lower extremity.  Pulses are 2+ bilaterally.    Differential diagnosis includes fibula fracture, tibia fracture, ankle dislocation. Received a dose of IV Dilaudid.  XR of the right foot, tib-fib and ankle shows an acute proximal and lateral displaced oblique fracture of the distal fibular metaphysis.  Marked widening of the medial clear space and lateral subluxation of the talus in relation to the distal tibia plafound consistent with an associated ligament injury.  The distal tibial fibular syndesmosis is intact.  Soft tissue edema with the lateral ankle.  Patient is resting comfortably.  Consent was signed.  Propofol was used for conscious sedation.  Patient's ankle was reduced.  Patient was placed in a splint.  Distal pulses " were intact postprocedure. Post x-ray is pending.  Patient will be discharged home with oxycodone and Tylenol.  Patient will rest, ice and remain nonweightbearing.  Cast care was discussed with the patient and the family.  Patient follow-up with [facility] to discuss ED visit.  Patient will return to the ED if new symptoms develop or symptoms worsen.  Patient with plan.  All questions answered.    ED COURSE:   6:47 PM  I saw the patient. Staffed with Dr. Obrien.   7:25 PM Consent was obtained for sedation.   8:22 PM  Sedation and reduction was completed. Patient obtaining post XR images.   8:42 PM  Signed out to Dr. Obrien pending post reduction films.     At the conclusion of the encounter I discussed the results of all of the tests and the disposition. The questions were answered. The patient or family acknowledged understanding and was agreeable with the care plan.     0 minutes of critical care time       Additional Documentation    History:  Supplemental history from: Documented in chart, if applicable  External Record(s) reviewed: Documented in chart, if applicable.    Work Up:  Chart documentation includes differential considered and any EKGs or imaging interpreted by provider.  In additional to work up documented, I considered the following work up: Documented in chart, if applicable.    External consultation:  Discussion of management with another provider: Documented in chart, if applicable    Complicating factors:  Care impacted by chronic illness: N/A  Care affected by social determinants of health: N/A    Disposition considerations: Discharge. I prescribed additional prescription strength medication(s) as charted. See documentation for any additional details.      MEDICATIONS GIVEN IN THE EMERGENCY:  Medications   propofol (DIPRIVAN) injection 10 mg/mL vial (has no administration in time range)   HYDROmorphone (DILAUDID) injection 1 mg (1 mg Intravenous $Given 12/31/23 5423)       NEW  PRESCRIPTIONS STARTED AT TODAY'S ER VISIT  New Prescriptions    ACETAMINOPHEN (TYLENOL) 500 MG TABLET    Take 1 tablet (500 mg) by mouth every 6 hours as needed for pain or fever    OXYCODONE (ROXICODONE) 5 MG TABLET    Take 1 tablet (5 mg) by mouth every 6 hours as needed for pain      =================================================================    HPI    Patient information was obtained from: patient    Use of : N/A         Darin Maier is a 38 year old male with a pertinent history of MS  who presents to this ED for evaluation of ankle pain.     Prior to arrival patient was playing hockey when he fell.  Patient reports that his right ankle was going the wrong direction.  Patient reports that he reduced his own ankle.  Patient describes pain as sharp and 10 out of 10.    He denies knee pain, calf pain, sensation changes.      REVIEW OF SYSTEMS   Review of Systems   Constitutional:  Negative for chills and fever.   Musculoskeletal:         Ankle pain.    Neurological:  Negative for numbness.     PAST MEDICAL HISTORY:  History reviewed. No pertinent past medical history.    PAST SURGICAL HISTORY:  History reviewed. No pertinent surgical history.        CURRENT MEDICATIONS:    acetaminophen (TYLENOL) 500 MG tablet  amphetamine-dextroamphetamine (ADDERALL XR) 20 MG 24 hr capsule  amphetamine-dextroamphetamine (ADDERALL) 10 MG tablet  omeprazole (PRILOSEC) 20 MG DR capsule  oxyCODONE (ROXICODONE) 5 MG tablet  Clemastine Fumarate 2.68 MG TABS  dimethyl fumarate (TECFIDERA) 120 MG CPDR  dimethyl fumarate 240 MG CPDR  famotidine (PEPCID) 40 MG tablet  gabapentin (NEURONTIN) 300 MG capsule  interferon beta-1a (REBIF) 44 MCG/0.5ML injection  methylPREDNISolone (MEDROL DOSEPAK) 4 MG tablet therapy pack  methylPREDNISolone (MEDROL DOSEPAK) 4 MG tablet therapy pack  naproxen sodium (ANAPROX) 220 MG tablet  nortriptyline (PAMELOR) 50 MG capsule  omeprazole (PRILOSEC) 20 MG DR capsule  peginterferon beta-1a  (PLEGRIDY STARTER PACK) 63 & 94 MCG/0.5ML prefilled pen starter kit  peginterferon beta-1a (PLEGRIDY) 125 MCG/0.5ML SOPN prefilled pen  senna-docusate (SENOKOT-S/PERICOLACE) 8.6-50 MG tablet        ALLERGIES:  Allergies   Allergen Reactions    No Known Drug Allergy        FAMILY HISTORY:  No family history on file.    SOCIAL HISTORY:   Social History     Socioeconomic History    Marital status:      Spouse name: None    Number of children: None    Years of education: None    Highest education level: None   Tobacco Use    Smoking status: Never    Smokeless tobacco: Former       VITALS:  BP (!) 146/87   Pulse 84   Temp 96.9  F (36.1  C) (Temporal)   Resp 18   SpO2 100%     PHYSICAL EXAM    Physical Exam  Vitals and nursing note reviewed.   Constitutional:       Appearance: Normal appearance.   HENT:      Head: Atraumatic.      Right Ear: External ear normal.      Left Ear: External ear normal.      Nose: Nose normal.      Mouth/Throat:      Mouth: Mucous membranes are moist.   Eyes:      Conjunctiva/sclera: Conjunctivae normal.      Pupils: Pupils are equal, round, and reactive to light.   Cardiovascular:      Rate and Rhythm: Normal rate and regular rhythm.      Pulses: Normal pulses.      Heart sounds: Normal heart sounds. No murmur heard.     No friction rub. No gallop.   Pulmonary:      Effort: Pulmonary effort is normal.      Breath sounds: Normal breath sounds. No wheezing or rales.   Abdominal:      Tenderness: There is no abdominal tenderness. There is no guarding or rebound.   Musculoskeletal:      Cervical back: Normal range of motion.      Comments: Right ankle is tender to palpation with overlying edema.  Decreased range of motion and strength of the right ankle secondary due to pain.  Remainder of the right lower extremity is nontender to palpation.  Normal sensation throughout.  No overlying erythema, ecchymosis, lacerations or abrasions.  No warmth.  Pulses are 2+ bilaterally.   Skin:      General: Skin is dry.   Neurological:      Mental Status: He is alert. Mental status is at baseline.   Psychiatric:         Mood and Affect: Mood normal.         Thought Content: Thought content normal.          LAB:  All pertinent labs reviewed and interpreted.  Labs Ordered and Resulted from Time of ED Arrival to Time of ED Departure - No data to display     RADIOLOGY:  Reviewed all pertinent imaging. Please see official radiology report.  XR Ankle Right G/E 3 Views    (Results Pending)   Foot  XR, G/E 3 views, right    (Results Pending)   XR Tibia and Fibula Right 2 Views    (Results Pending)   Ankle XR, G/E 3 views, right    (Results Pending)     Unfortunately we were unable to see the results digitally. [facility] faxed over the results.    X-ray of the ankle, foot and tib-fib (prereduction):  Acute proximal and lateral displaced oblique fracture of the distal fibular metaphysis cyst.  Marked widening of the medial clear space and lateral subluxation of the talus in relation to the distal tibia plafond consistent with associated ligamentous injury.  The distal tibiofibular syndesmosis is intact.  Soft tissue edema about the lateral ankle.  No additional fracture.  Mild hallux valgus    XR of the ankle (post reduction):  Pending     PROCEDURES:   See Dr. Obrien note.       Meseret Schmitt PA-C  [facility] EMERGENCY ROOM  [address] [facility] [address]  [phone]     Meseret Schmitt PA-C  12/31/23 2043

## 2024-01-01 NOTE — PROGRESS NOTES
Called to room for conscious sedation at 1957. AMBU bag, suction, ETCO2 with NC at 2L on and functioning. Pt tolerated well, oxygen was increased briefly to 5L then decreased back to 3L at the end of procedure. SAT 99% left room at 2020. No other intervention required.

## 2024-01-01 NOTE — DISCHARGE INSTRUCTIONS
You have an ankle fracture.  Keep splint clean and dry.  Keep splint on.  Take Tylenol every 6 hours for your pain.  Do not take more Tylenol than prescribed.  Have also prescribed you oxycodone for your pain.  Do not drive or operate heavy machinery while taking oxycodone.  Take oxycodone as prescribed.  Ice your ankle.  Elevate your ankle.  Use your crutches to help you walk. Remind non weightbearing.   Follow-up with [facility] soon as possible.  Return to the ED if new symptoms develop or symptoms worsen.

## 2024-01-01 NOTE — ED TRIAGE NOTES
Pt presents with R ankle pain after injuring during hockey. 10/10 pain. Swelling noted. ABCs intact.       Triage Assessment (Adult)       Row Name 12/31/23 1826          Triage Assessment    Airway WDL WDL        Respiratory WDL    Respiratory WDL WDL        Skin Circulation/Temperature WDL    Skin Circulation/Temperature WDL WDL        Cardiac WDL    Cardiac WDL WDL        Peripheral/Neurovascular WDL    Peripheral Neurovascular WDL WDL        Cognitive/Neuro/Behavioral WDL    Cognitive/Neuro/Behavioral WDL WDL

## 2024-01-01 NOTE — ED PROVIDER NOTES
Emergency Department Midlevel Supervisory Note     I personally saw the patient and performed a substantive portion of the visit including all aspects of the medical decision making.    ED Course:  6:42 PM Meseret Schmitt PA-C staffed patient with me. I agree with their assessment and plan of management, and I will see the patient.  6:56 PM I met with the patient to introduce myself, gather additional history, perform my initial exam, and discuss the plan.   8:01 PM Performed sedation, reduction, and splinting.   9:16 PM Called [facility] to determine when the patient's post-reduction x-ray would be read.   9:29 PM I rechecked and updated the patient. I discussed the plan for discharge with the patient, and patient is agreeable. We discussed supportive cares at home and reasons for return to the ER including new or worsening symptoms - all questions and concerns addressed. Patient to be discharged by RN.     Brief HPI:     Darin Maier is a 38 year old male who presents for evaluation of ankle pain.     The patient reports that he was playing hockey this evening when he fell, injuring his right ankle. He states that his ankle appeared to be out of place when he fell, and he put it back into place. He endorses right ankle pain and tingling. He currently rates his pain as 10/10, and describes it as sharp. The patient denies numbness, and any other symptoms or complaints at this time.     I, Khushi Marck am serving as a scribe to document services personally performed by Yadiel Obrien MD, based on my observation and the provider's statements to me. I, Yadiel Obrien MD attest that Khushi Acharya is acting in a scribe capacity, has observed my performance of the services and has documented them in accordance with my direction.     Brief Physical Exam: BP (!) 156/85   Pulse 80   Temp 96.9  F (36.1  C) (Temporal)   Resp 14   Wt 97.5 kg (215 lb)   SpO2 100%   BMI 28.37 kg/m    Constitutional:  Alert,  in no acute distress  EYES: Conjunctivae clear  HENT:  Atraumatic, normocephalic  Respiratory:  Respirations even, unlabored, in no acute respiratory distress  Cardiovascular:  Regular rate and rhythm, good peripheral perfusion.   GI: Soft, nondistended, nontender, no palpable masses, no rebound, no guarding   Musculoskeletal:  No edema. No cyanosis. Deformity of right ankle with full pulses and cap refill of less than 2 seconds. Sensation and skin intact. Right knee and proximal tibia/fibula are non tender.    Integument: Warm, Dry, No erythema, No rash.   Neurologic:  Alert & oriented, no focal deficits noted  Psych: Normal mood and affect       Procedures   See details below       1. Closed fracture of distal end of fibula, unspecified fracture morphology, unspecified laterality, initial encounter        Labs and Imaging:       X-ray of the ankle, foot and tib-fib (pre-reduction):  Acute proximal and lateral displaced oblique fracture of the distal fibular metaphysis cyst.  Marked widening of the medial clear space and lateral subluxation of the talus in relation to the distal tibia plafond consistent with associated ligamentous injury.  The distal tibiofibular syndesmosis is intact.  Soft tissue edema about the lateral ankle.  No additional fracture.  Mild hallux valgus    X-ray of the right ankle (post-reduction):   Overlying splinting material degrades evaluation of fine osseous detail. Interval closed reduction and splinting of a laterally displaced distal fibular metaphyseal fracture and associated lateral subluxation of the tibiotalar joint with improvement in alignment.     I have reviewed the relevant laboratory and radiology studies    Procedures:  I was present for the key portions of this procedure: conscious sedation, reduction, and splint placement    PROCEDURE: Procedural Sedation  Orthopedic Injury Reduction  Splint Placement   INDICATIONS: Sedation is required to allow for fracture reduction  (right distal fibula)   SEDATION PROVIDER: Meseret Schmitt PA-C   PROCEDURE PROVIDER: Meseret Schmitt PA-C   LEVEL OF SEDATION: Deep Sedation    Defined as:  Minimal = Normal response to verbal  Moderate = Responds to verbal and light tactile stimulation  Deep = Responds after repeated painful stimulation   CONSENT: Risks, benefits and alternatives were discussed with and Written consent was obtained from Patient.   PROCEDURE SPECIFIC CHECKLIST COMPLETED:   Yes   LAST ORAL INTAKE: Clear Liquids >2 hours   ASA CLASS: 1 - Healthy patient, no medical problems   MALLAMPATI:  I - Faucial pillars, soft palate, and uvula are visible   TIME OUT: Universal protocol was followed. TIME OUT conducted just prior to starting procedure confirmed patient identity, site/side, procedure, patient position, and availability of correct equipment. Yes    Immediately prior to initiation of sedation, reassessment of clinical condition was performed which was unchanged.   MEDICATIONS: Propofol, 200 mg, IV   MONITORING: Monitoring consisted of:   heart rate, cardiac monitor, continuous pulse oximeter, continuous capnometry (end tidal CO2), frequent blood pressure checks, level of consciousness checks, IV access, constant attendance by RN until patient is recovered, and constant attendance by MD until patient is stable   RESPONSE: vital signs stable and airway patent   PROCEDURE NOTE:   ORTHOPEDIC MANAGEMENT:  Bone/Joint Manipulation: Affected extremity was grasped and gradual traction was applied and was further manipulated manually until alignment and positioning were improved.     SPLINTING/IMMOBILIZATION:   A Posterior slab (short leg) with Stirrup splint made of Orthoglass was applied.  After placement I checked and adjusted the fit to ensure proper positioning. Patient was more comfortable with the splint in place.  Sensation and circulation are intact after splint placement.   POST-SEDATION ASSESSMENT/NOTE: Lowest level oxygen  saturation reached was 85%.    Post procedure patient was alert and responds to verbal stimuli    Patient was monitored during recovery and returned to pre-procedure baseline.   ADDITIONAL MD ASSISTANCE: Dr. Yadiel Obrine   TOTAL MD DRUG ADMINISTRATION / MONITORING TIME: 17 minutes   COMPLICATIONS:  Patient tolerated procedure well, without complication         Yadiel Obrien  [facility] EMERGENCY ROOM  [address] [place] [id number]  [phone]       Yadiel Obrien MD  12/31/23 [id number]

## 2024-01-22 ENCOUNTER — MYC REFILL (OUTPATIENT)
Dept: NEUROLOGY | Facility: CLINIC | Age: 39
End: 2024-01-22
Payer: COMMERCIAL

## 2024-01-22 DIAGNOSIS — G35 MULTIPLE SCLEROSIS (H): ICD-10-CM

## 2024-01-23 RX ORDER — DEXTROAMPHETAMINE SACCHARATE, AMPHETAMINE ASPARTATE MONOHYDRATE, DEXTROAMPHETAMINE SULFATE AND AMPHETAMINE SULFATE 5; 5; 5; 5 MG/1; MG/1; MG/1; MG/1
20 CAPSULE, EXTENDED RELEASE ORAL DAILY
Qty: 30 CAPSULE | Refills: 0 | Status: SHIPPED | OUTPATIENT
Start: 2024-01-23 | End: 2024-02-20

## 2024-01-23 RX ORDER — DEXTROAMPHETAMINE SACCHARATE, AMPHETAMINE ASPARTATE, DEXTROAMPHETAMINE SULFATE AND AMPHETAMINE SULFATE 2.5; 2.5; 2.5; 2.5 MG/1; MG/1; MG/1; MG/1
10 TABLET ORAL DAILY
Qty: 30 TABLET | Refills: 0 | Status: SHIPPED | OUTPATIENT
Start: 2024-01-23 | End: 2024-02-20

## 2024-02-20 ENCOUNTER — MYC REFILL (OUTPATIENT)
Dept: NEUROLOGY | Facility: CLINIC | Age: 39
End: 2024-02-20
Payer: COMMERCIAL

## 2024-02-20 DIAGNOSIS — G35 MULTIPLE SCLEROSIS (H): ICD-10-CM

## 2024-02-20 NOTE — TELEPHONE ENCOUNTER
Patient requesting refills of their Adderall 10 mg and Adderall XR 20 mg; Patient was last seen in Sep 2023 and has follow up appointment in Sep 2024 with Dr Phelps. Pended rx to Dr Phelps for signature and will send electronically to the pharmacy once signed.    Josi Little RN

## 2024-02-21 RX ORDER — DEXTROAMPHETAMINE SACCHARATE, AMPHETAMINE ASPARTATE, DEXTROAMPHETAMINE SULFATE AND AMPHETAMINE SULFATE 2.5; 2.5; 2.5; 2.5 MG/1; MG/1; MG/1; MG/1
10 TABLET ORAL DAILY
Qty: 30 TABLET | Refills: 0 | Status: SHIPPED | OUTPATIENT
Start: 2024-02-21 | End: 2024-03-04

## 2024-02-21 RX ORDER — DEXTROAMPHETAMINE SACCHARATE, AMPHETAMINE ASPARTATE MONOHYDRATE, DEXTROAMPHETAMINE SULFATE AND AMPHETAMINE SULFATE 5; 5; 5; 5 MG/1; MG/1; MG/1; MG/1
20 CAPSULE, EXTENDED RELEASE ORAL DAILY
Qty: 30 CAPSULE | Refills: 0 | Status: SHIPPED | OUTPATIENT
Start: 2024-02-21 | End: 2024-03-04

## 2024-03-02 ENCOUNTER — MYC MEDICAL ADVICE (OUTPATIENT)
Dept: NEUROLOGY | Facility: CLINIC | Age: 39
End: 2024-03-02
Payer: COMMERCIAL

## 2024-03-02 DIAGNOSIS — G35 MULTIPLE SCLEROSIS (H): ICD-10-CM

## 2024-03-04 RX ORDER — DEXTROAMPHETAMINE SACCHARATE, AMPHETAMINE ASPARTATE, DEXTROAMPHETAMINE SULFATE AND AMPHETAMINE SULFATE 2.5; 2.5; 2.5; 2.5 MG/1; MG/1; MG/1; MG/1
10 TABLET ORAL DAILY
Qty: 30 TABLET | Refills: 0 | Status: SHIPPED | OUTPATIENT
Start: 2024-03-04 | End: 2024-04-05

## 2024-03-04 RX ORDER — DEXTROAMPHETAMINE SACCHARATE, AMPHETAMINE ASPARTATE MONOHYDRATE, DEXTROAMPHETAMINE SULFATE AND AMPHETAMINE SULFATE 5; 5; 5; 5 MG/1; MG/1; MG/1; MG/1
20 CAPSULE, EXTENDED RELEASE ORAL DAILY
Qty: 30 CAPSULE | Refills: 0 | Status: SHIPPED | OUTPATIENT
Start: 2024-03-04 | End: 2024-04-05

## 2024-03-04 NOTE — CONFIDENTIAL NOTE
Spoke with [facility] pharmacist. They are now able to electronically receive orders for controlled substances and recommend sending a new Rx over (Adderall Rx sent on 2/21 did not transmit to pharmacy).      Patient requesting refill of their Adderall (10 mg cap and 20 mg tabs); Patient was last seen in September and has follow up appointment in September with Dr. Phelps. Pended rx to Dr. Phelps for signature and will send electronically to the pharmacy once signed.    Teresa Chinchilla RN

## 2024-04-05 ENCOUNTER — MYC REFILL (OUTPATIENT)
Dept: NEUROLOGY | Facility: CLINIC | Age: 39
End: 2024-04-05
Payer: COMMERCIAL

## 2024-04-05 DIAGNOSIS — G35 MULTIPLE SCLEROSIS (H): ICD-10-CM

## 2024-04-05 DIAGNOSIS — R52 PAIN: ICD-10-CM

## 2024-04-05 RX ORDER — DEXTROAMPHETAMINE SACCHARATE, AMPHETAMINE ASPARTATE, DEXTROAMPHETAMINE SULFATE AND AMPHETAMINE SULFATE 2.5; 2.5; 2.5; 2.5 MG/1; MG/1; MG/1; MG/1
10 TABLET ORAL DAILY
Qty: 30 TABLET | Refills: 0 | Status: SHIPPED | OUTPATIENT
Start: 2024-04-05 | End: 2024-05-03

## 2024-04-05 RX ORDER — DEXTROAMPHETAMINE SACCHARATE, AMPHETAMINE ASPARTATE MONOHYDRATE, DEXTROAMPHETAMINE SULFATE AND AMPHETAMINE SULFATE 5; 5; 5; 5 MG/1; MG/1; MG/1; MG/1
20 CAPSULE, EXTENDED RELEASE ORAL DAILY
Qty: 30 CAPSULE | Refills: 0 | Status: SHIPPED | OUTPATIENT
Start: 2024-04-05 | End: 2024-05-03

## 2024-04-05 RX ORDER — GABAPENTIN 300 MG/1
CAPSULE ORAL
Qty: 120 CAPSULE | Refills: 11 | Status: CANCELLED | OUTPATIENT
Start: 2024-04-05

## 2024-04-05 NOTE — CONFIDENTIAL NOTE
Patient requesting refill of their adderall scripts; Patient was last seen in September and has follow up appointment in September with Dr. Phelps. Pended rx to Dr. Phelps for signature and will send electronically to the pharmacy once signed.    Teresa Chinchilla RN

## 2024-05-03 ENCOUNTER — MYC REFILL (OUTPATIENT)
Dept: NEUROLOGY | Facility: CLINIC | Age: 39
End: 2024-05-03
Payer: COMMERCIAL

## 2024-05-03 DIAGNOSIS — G35 MULTIPLE SCLEROSIS (H): ICD-10-CM

## 2024-05-03 RX ORDER — DEXTROAMPHETAMINE SACCHARATE, AMPHETAMINE ASPARTATE, DEXTROAMPHETAMINE SULFATE AND AMPHETAMINE SULFATE 2.5; 2.5; 2.5; 2.5 MG/1; MG/1; MG/1; MG/1
10 TABLET ORAL DAILY
Qty: 30 TABLET | Refills: 0 | Status: SHIPPED | OUTPATIENT
Start: 2024-05-03 | End: 2024-06-03

## 2024-05-03 RX ORDER — DEXTROAMPHETAMINE SACCHARATE, AMPHETAMINE ASPARTATE MONOHYDRATE, DEXTROAMPHETAMINE SULFATE AND AMPHETAMINE SULFATE 5; 5; 5; 5 MG/1; MG/1; MG/1; MG/1
20 CAPSULE, EXTENDED RELEASE ORAL DAILY
Qty: 30 CAPSULE | Refills: 0 | Status: SHIPPED | OUTPATIENT
Start: 2024-05-03 | End: 2024-06-03

## 2024-05-29 ENCOUNTER — TELEPHONE (OUTPATIENT)
Dept: NEUROLOGY | Facility: CLINIC | Age: 39
End: 2024-05-29
Payer: COMMERCIAL

## 2024-05-29 NOTE — TELEPHONE ENCOUNTER
[facility] Call Center    Phone Message    May a detailed message be left on voicemail: yes     Reason for Call: Medication Question or concern regarding medication   Prescription Clarification  Name of Medication: interferon beta-1a (REBIF) 44 MCG/0.5ML injection   Prescribing Provider: Christian Phelps MD   Pharmacy:    What on the order needs clarification? Asking to appeal the prior auth denial.    Please call Dion at [phone] to discuss further.      Action Taken: Message routed to:  [facility] Neurology    Travel Screening: Not Applicable

## 2024-05-29 NOTE — TELEPHONE ENCOUNTER
PRIOR AUTHORIZATION DENIED    Medication: REBIF 44 MCG/0.5ML SC SOSY  Insurance Company: [employer] - Phone [phone] Fax [phone]  Denial Date: 5/17/2024  Denial Reason(s): Try/Fail avonex  Appeal Information:     Patient Notified: Yes          Thank you,    Libby Caro CPh-T  Specialty Pharmacy Clinic Liaison - CardiologyNeurologyMultiple Sclerosis  [facility] Surgery [address] [address]  Ph: [phone] Fax: [phone]  [email or web address]

## 2024-05-29 NOTE — TELEPHONE ENCOUNTER
Spoke with Dion at [facility]. Pt on Rebif through their PAP program. The previous PAP approval has  and they require outcomes of PA and appeal be sent to them. Dion informed me that he has been in touch with [facility] and that a PA was denied on . This will need to be appealed. He also provided contact info for where to send the appeal: phone [phone], fax [phone].    Josi Little RN

## 2024-05-29 NOTE — LETTER
5/30/2024       RE: Darin Maier  [address] [facility] [address]     To Whom It May Concern:     I am writing on behalf of my patient, Darin Maier, to document the medical necessity of Rebif for the treatment of multiple sclerosis.       You have denied Rebif, stating that Mr. Maier must try and fail Avonex.  Mr. Maier has already tried and failed Plegridy.  He had new active lesions on MRI in August 2022 while on Plegridy.  Thus, expecting a lower efficacy drug like Avonex to be effective in controlling Mr. Maier's disease is not rational. Making Mr. Maier try and fail Avonex is medically inappropriate as there is a proven dose-related effect such that higher dose interferon (like Rebif) is more effective at controlling MS than lower dose interferon (like Avonex).  Mr. Maier has previously been able to receive Rebif through a patient assistance program, and his disease has remained stable while on this medication.  It is my assessment, as Mr. Maier's neurologist and as a specialist in the management of multiple sclerosis, that it is medically necessary to continue to treat Mr. Maier with Rebif.      I will remind you that Rebif is a FDA-approved treatment for multiple sclerosis.  There is no medical or safety basis as to why you are denying coverage of Rebif. In order to provide Mr. Maier the best chance of maintaining his ability to be a functioning, contributing member of society, I urge you to cover Rebif as soon as possible.  Please contact my office with any questions.      Sincerely,      Christian Phelps MD  [facility]   [address] [place] [address]  Phone: [phone]  Fax: [phone]

## 2024-05-30 NOTE — TELEPHONE ENCOUNTER
Appeal letter available. Please submit appeal. If denied, will need to fax both denials to [employer] for continued PAP eligibility.    Josi Little RN

## 2024-05-31 NOTE — TELEPHONE ENCOUNTER
Medication Appeal Initiation    Medication: REBIF 44 MCG/0.5ML SC SOSY  Appeal Start Date:  5/31/2024  Insurance Company: [employer]  Insurance Phone: [phone]  Insurance Fax: [phone]  Comments:   Appeal letter faxed to plan

## 2024-05-31 NOTE — TELEPHONE ENCOUNTER
Verito called from zechariah Rod at [facility] seeing if there is any additional information other than what was already submitted to be included in the appeal review. If so can be faxed to [phone] by end of day. Phone: [phone] secure vm

## 2024-06-03 ENCOUNTER — MYC REFILL (OUTPATIENT)
Dept: NEUROLOGY | Facility: CLINIC | Age: 39
End: 2024-06-03
Payer: COMMERCIAL

## 2024-06-03 DIAGNOSIS — G35 MULTIPLE SCLEROSIS (H): ICD-10-CM

## 2024-06-03 RX ORDER — DEXTROAMPHETAMINE SACCHARATE, AMPHETAMINE ASPARTATE, DEXTROAMPHETAMINE SULFATE AND AMPHETAMINE SULFATE 2.5; 2.5; 2.5; 2.5 MG/1; MG/1; MG/1; MG/1
10 TABLET ORAL DAILY
Qty: 30 TABLET | Refills: 0 | Status: SHIPPED | OUTPATIENT
Start: 2024-06-03 | End: 2024-07-08

## 2024-06-03 RX ORDER — DEXTROAMPHETAMINE SACCHARATE, AMPHETAMINE ASPARTATE MONOHYDRATE, DEXTROAMPHETAMINE SULFATE AND AMPHETAMINE SULFATE 5; 5; 5; 5 MG/1; MG/1; MG/1; MG/1
20 CAPSULE, EXTENDED RELEASE ORAL DAILY
Qty: 30 CAPSULE | Refills: 0 | Status: SHIPPED | OUTPATIENT
Start: 2024-06-03 | End: 2024-07-08

## 2024-06-05 NOTE — TELEPHONE ENCOUNTER
PRIOR AUTHORIZATION DENIED    Medication: REBIF 44 MCG/0.5ML SC SOSY  Insurance Company: [employer] - Phone [phone] Fax [phone]  Denial Date: 5/17/2024  Denial Reason(s): Try/Fail Avonex  Appeal Information: External   Patient Notified: Yes                  Thank you,    Libby Caro h-T  Specialty Pharmacy Clinic Liaison - CardiologyNeurologyMultiple Sclerosis  [facility] [address] [address]  Ph: [phone] Fax: [phone]  [email or web address]

## 2024-06-17 ENCOUNTER — DOCUMENTATION ONLY (OUTPATIENT)
Dept: NEUROLOGY | Facility: CLINIC | Age: 39
End: 2024-06-17
Payer: COMMERCIAL

## 2024-06-17 NOTE — PROGRESS NOTES
Authorization for Rebif has been received from [employer], approval valid from 1984.  Carlos Teixeira EMT June 17, 2024

## 2024-06-29 ENCOUNTER — HEALTH MAINTENANCE LETTER (OUTPATIENT)
Age: 39
End: 2024-06-29

## 2024-07-08 ENCOUNTER — MYC REFILL (OUTPATIENT)
Dept: NEUROLOGY | Facility: CLINIC | Age: 39
End: 2024-07-08
Payer: COMMERCIAL

## 2024-07-08 DIAGNOSIS — G35 MULTIPLE SCLEROSIS (H): ICD-10-CM

## 2024-07-09 RX ORDER — DEXTROAMPHETAMINE SACCHARATE, AMPHETAMINE ASPARTATE, DEXTROAMPHETAMINE SULFATE AND AMPHETAMINE SULFATE 2.5; 2.5; 2.5; 2.5 MG/1; MG/1; MG/1; MG/1
10 TABLET ORAL DAILY
Qty: 30 TABLET | Refills: 0 | Status: SHIPPED | OUTPATIENT
Start: 2024-07-09 | End: 2024-08-08

## 2024-07-09 RX ORDER — DEXTROAMPHETAMINE SACCHARATE, AMPHETAMINE ASPARTATE MONOHYDRATE, DEXTROAMPHETAMINE SULFATE AND AMPHETAMINE SULFATE 5; 5; 5; 5 MG/1; MG/1; MG/1; MG/1
20 CAPSULE, EXTENDED RELEASE ORAL DAILY
Qty: 30 CAPSULE | Refills: 0 | Status: SHIPPED | OUTPATIENT
Start: 2024-07-09 | End: 2024-08-08

## 2024-07-09 NOTE — CONFIDENTIAL NOTE
Patient requesting refill of their Adderall; Patient was last seen in September and has follow up appointment in September with Dr. Phelps. Pended rx to Dr. Phelps for signature and will send electronically to the pharmacy once signed.    Teresa Chinchilla RN

## 2024-08-08 ENCOUNTER — MYC REFILL (OUTPATIENT)
Dept: NEUROLOGY | Facility: CLINIC | Age: 39
End: 2024-08-08
Payer: COMMERCIAL

## 2024-08-08 DIAGNOSIS — G35 MULTIPLE SCLEROSIS (H): ICD-10-CM

## 2024-08-09 RX ORDER — DEXTROAMPHETAMINE SACCHARATE, AMPHETAMINE ASPARTATE, DEXTROAMPHETAMINE SULFATE AND AMPHETAMINE SULFATE 2.5; 2.5; 2.5; 2.5 MG/1; MG/1; MG/1; MG/1
10 TABLET ORAL DAILY
Qty: 30 TABLET | Refills: 0 | Status: SHIPPED | OUTPATIENT
Start: 2024-08-09 | End: 2024-09-02

## 2024-08-09 RX ORDER — DEXTROAMPHETAMINE SACCHARATE, AMPHETAMINE ASPARTATE MONOHYDRATE, DEXTROAMPHETAMINE SULFATE AND AMPHETAMINE SULFATE 5; 5; 5; 5 MG/1; MG/1; MG/1; MG/1
20 CAPSULE, EXTENDED RELEASE ORAL DAILY
Qty: 30 CAPSULE | Refills: 0 | Status: SHIPPED | OUTPATIENT
Start: 2024-08-09 | End: 2024-09-02

## 2024-09-02 ENCOUNTER — MYC REFILL (OUTPATIENT)
Dept: NEUROLOGY | Facility: CLINIC | Age: 39
End: 2024-09-02
Payer: COMMERCIAL

## 2024-09-02 DIAGNOSIS — G35 MULTIPLE SCLEROSIS (H): ICD-10-CM

## 2024-09-03 RX ORDER — DEXTROAMPHETAMINE SACCHARATE, AMPHETAMINE ASPARTATE, DEXTROAMPHETAMINE SULFATE AND AMPHETAMINE SULFATE 2.5; 2.5; 2.5; 2.5 MG/1; MG/1; MG/1; MG/1
10 TABLET ORAL DAILY
Qty: 30 TABLET | Refills: 0 | Status: SHIPPED | OUTPATIENT
Start: 2024-09-03 | End: 2024-10-02

## 2024-09-03 RX ORDER — DEXTROAMPHETAMINE SACCHARATE, AMPHETAMINE ASPARTATE MONOHYDRATE, DEXTROAMPHETAMINE SULFATE AND AMPHETAMINE SULFATE 5; 5; 5; 5 MG/1; MG/1; MG/1; MG/1
20 CAPSULE, EXTENDED RELEASE ORAL DAILY
Qty: 30 CAPSULE | Refills: 0 | Status: SHIPPED | OUTPATIENT
Start: 2024-09-03 | End: 2024-10-02

## 2024-09-03 NOTE — TELEPHONE ENCOUNTER
Patient requesting refill of their Adderall 10 mg and Adderall XR 20 mg; Patient was last seen in Sep 2023 and has no follow-up appt with Dr Phelps (canceled today's appt). Reminded pt to reschedule. Pended rx to Dr Phelps for signature and will send electronically to the pharmacy once signed.    Josi Little RN

## 2024-10-02 ENCOUNTER — MYC REFILL (OUTPATIENT)
Dept: NEUROLOGY | Facility: CLINIC | Age: 39
End: 2024-10-02
Payer: COMMERCIAL

## 2024-10-02 DIAGNOSIS — J30.2 SEASONAL ALLERGIC RHINITIS, UNSPECIFIED TRIGGER: ICD-10-CM

## 2024-10-02 DIAGNOSIS — G35 MULTIPLE SCLEROSIS (H): ICD-10-CM

## 2024-10-03 RX ORDER — DEXTROAMPHETAMINE SACCHARATE, AMPHETAMINE ASPARTATE MONOHYDRATE, DEXTROAMPHETAMINE SULFATE AND AMPHETAMINE SULFATE 5; 5; 5; 5 MG/1; MG/1; MG/1; MG/1
20 CAPSULE, EXTENDED RELEASE ORAL DAILY
Qty: 30 CAPSULE | Refills: 0 | Status: SHIPPED | OUTPATIENT
Start: 2024-10-03 | End: 2024-11-01

## 2024-10-03 RX ORDER — DEXTROAMPHETAMINE SACCHARATE, AMPHETAMINE ASPARTATE, DEXTROAMPHETAMINE SULFATE AND AMPHETAMINE SULFATE 2.5; 2.5; 2.5; 2.5 MG/1; MG/1; MG/1; MG/1
10 TABLET ORAL DAILY
Qty: 30 TABLET | Refills: 0 | Status: SHIPPED | OUTPATIENT
Start: 2024-10-03 | End: 2024-11-01

## 2024-10-03 RX ORDER — CLEMASTINE FUMARATE 2.68 MG
2 TABLET ORAL 2 TIMES DAILY
Qty: 120 TABLET | Refills: 0 | Status: SHIPPED | OUTPATIENT
Start: 2024-10-03

## 2024-10-03 NOTE — TELEPHONE ENCOUNTER
Patient requesting refill of their clemastine and Adderall; Patient was last seen in Sep 2023 and has no follow up appointment with Dr Phelps. Requests routed to Dr Phelps and reminder to schedule follow-up sent to ptRoxy Little RN

## 2024-10-10 ENCOUNTER — DOCUMENTATION ONLY (OUTPATIENT)
Dept: NEUROLOGY | Facility: CLINIC | Age: 39
End: 2024-10-10
Payer: COMMERCIAL

## 2024-10-10 NOTE — PROGRESS NOTES
Called patient to schedule MS follow up with Dr. Phelps, no answer left voicemail with clinic number [phone] to call back and schedule appointment.   Carlos Teixeira EMT October 10, 2024

## 2024-11-01 ENCOUNTER — MYC REFILL (OUTPATIENT)
Dept: NEUROLOGY | Facility: CLINIC | Age: 39
End: 2024-11-01
Payer: COMMERCIAL

## 2024-11-01 DIAGNOSIS — G35 MULTIPLE SCLEROSIS (H): ICD-10-CM

## 2024-11-01 NOTE — TELEPHONE ENCOUNTER
Patient requesting refill of their Adderall; Patient was last seen in Sep 2023 and has no follow up appointment with Dr Phelps. Pended rx to Dr Phelps for approval. Another reminder sent to pt to schedule follow-up. Clinic assistant has left VM as well.    Josi Little RN

## 2024-11-04 RX ORDER — DEXTROAMPHETAMINE SACCHARATE, AMPHETAMINE ASPARTATE, DEXTROAMPHETAMINE SULFATE AND AMPHETAMINE SULFATE 2.5; 2.5; 2.5; 2.5 MG/1; MG/1; MG/1; MG/1
10 TABLET ORAL DAILY
Qty: 30 TABLET | Refills: 0 | Status: SHIPPED | OUTPATIENT
Start: 2024-11-04

## 2024-11-04 RX ORDER — DEXTROAMPHETAMINE SACCHARATE, AMPHETAMINE ASPARTATE MONOHYDRATE, DEXTROAMPHETAMINE SULFATE AND AMPHETAMINE SULFATE 5; 5; 5; 5 MG/1; MG/1; MG/1; MG/1
20 CAPSULE, EXTENDED RELEASE ORAL DAILY
Qty: 30 CAPSULE | Refills: 0 | Status: SHIPPED | OUTPATIENT
Start: 2024-11-04

## 2024-11-22 ENCOUNTER — TELEPHONE (OUTPATIENT)
Dept: NEUROLOGY | Facility: CLINIC | Age: 39
End: 2024-11-22

## 2024-11-22 NOTE — TELEPHONE ENCOUNTER
MRI order extended and faxed to [facility] (fax [phone]).    Teresa Chinchilla RN

## 2024-11-22 NOTE — TELEPHONE ENCOUNTER
[facility] Call Center    Phone Message    May a detailed message be left on voicemail: yes     Reason for Call: Order(s): Other:   Reason for requested: MRI  Date needed: asap  Provider name: Christian Phelps MD Star, with [facility], requesting new order for MRI be sent to fax #[phone]  Constantine states that the patient never called to schedule the last MRI and now that order is . Patient just called to schedule and a new order will be needed.     Constantine [facility]  Phone [phone] option #2  Fax [phone]    Action Taken: Message routed to:  [facility] Neurology    Travel Screening: Not Applicable

## 2024-11-30 ENCOUNTER — MYC REFILL (OUTPATIENT)
Dept: NEUROLOGY | Facility: CLINIC | Age: 39
End: 2024-11-30
Payer: COMMERCIAL

## 2024-11-30 DIAGNOSIS — K21.9 GASTROESOPHAGEAL REFLUX DISEASE WITHOUT ESOPHAGITIS: ICD-10-CM

## 2024-11-30 DIAGNOSIS — G35 MULTIPLE SCLEROSIS (H): ICD-10-CM

## 2024-12-02 RX ORDER — DEXTROAMPHETAMINE SACCHARATE, AMPHETAMINE ASPARTATE, DEXTROAMPHETAMINE SULFATE AND AMPHETAMINE SULFATE 2.5; 2.5; 2.5; 2.5 MG/1; MG/1; MG/1; MG/1
10 TABLET ORAL DAILY
Qty: 30 TABLET | Refills: 0 | Status: SHIPPED | OUTPATIENT
Start: 2024-12-02

## 2024-12-02 RX ORDER — DEXTROAMPHETAMINE SACCHARATE, AMPHETAMINE ASPARTATE MONOHYDRATE, DEXTROAMPHETAMINE SULFATE AND AMPHETAMINE SULFATE 5; 5; 5; 5 MG/1; MG/1; MG/1; MG/1
20 CAPSULE, EXTENDED RELEASE ORAL DAILY
Qty: 30 CAPSULE | Refills: 0 | Status: SHIPPED | OUTPATIENT
Start: 2024-12-02

## 2024-12-02 NOTE — TELEPHONE ENCOUNTER
Patient requesting refill of their Adderall (10 mg and XR 20 mg) and omeprazole; Patient was last seen in Sep 2023 and has no follow up appointment with Dr Phelps. Reminder sent to pt to schedule follow-up. Refill requests routed to Dr Phelps for approval.    Josi Little RN

## 2024-12-13 ENCOUNTER — TRANSFERRED RECORDS (OUTPATIENT)
Dept: HEALTH INFORMATION MANAGEMENT | Facility: CLINIC | Age: 39
End: 2024-12-13

## 2025-01-05 ENCOUNTER — MYC REFILL (OUTPATIENT)
Dept: NEUROLOGY | Facility: CLINIC | Age: 40
End: 2025-01-05
Payer: COMMERCIAL

## 2025-01-05 DIAGNOSIS — G35 MULTIPLE SCLEROSIS (H): ICD-10-CM

## 2025-01-06 RX ORDER — DEXTROAMPHETAMINE SACCHARATE, AMPHETAMINE ASPARTATE, DEXTROAMPHETAMINE SULFATE AND AMPHETAMINE SULFATE 2.5; 2.5; 2.5; 2.5 MG/1; MG/1; MG/1; MG/1
10 TABLET ORAL DAILY
Qty: 30 TABLET | Refills: 0 | Status: SHIPPED | OUTPATIENT
Start: 2025-01-06

## 2025-01-06 RX ORDER — DEXTROAMPHETAMINE SACCHARATE, AMPHETAMINE ASPARTATE MONOHYDRATE, DEXTROAMPHETAMINE SULFATE AND AMPHETAMINE SULFATE 5; 5; 5; 5 MG/1; MG/1; MG/1; MG/1
20 CAPSULE, EXTENDED RELEASE ORAL DAILY
Qty: 30 CAPSULE | Refills: 0 | Status: SHIPPED | OUTPATIENT
Start: 2025-01-06

## 2025-01-06 NOTE — TELEPHONE ENCOUNTER
Patient requesting refill of their Adderall 10 mg and Adderall XR 20 mg; Patient was last seen in Sep 2023 and has follow up appointment in Feb 2025 with Dr Phelps. Pended rx to Dr Phelps for signature and will send electronically to the pharmacy once signed.    Josi Little RN

## 2025-02-03 ENCOUNTER — MYC REFILL (OUTPATIENT)
Dept: NEUROLOGY | Facility: CLINIC | Age: 40
End: 2025-02-03
Payer: COMMERCIAL

## 2025-02-03 DIAGNOSIS — R52 PAIN: ICD-10-CM

## 2025-02-03 DIAGNOSIS — G35 MULTIPLE SCLEROSIS (H): ICD-10-CM

## 2025-02-04 RX ORDER — GABAPENTIN 300 MG/1
CAPSULE ORAL
Qty: 120 CAPSULE | Refills: 0 | Status: SHIPPED | OUTPATIENT
Start: 2025-02-04

## 2025-02-04 RX ORDER — DEXTROAMPHETAMINE SACCHARATE, AMPHETAMINE ASPARTATE, DEXTROAMPHETAMINE SULFATE AND AMPHETAMINE SULFATE 2.5; 2.5; 2.5; 2.5 MG/1; MG/1; MG/1; MG/1
10 TABLET ORAL DAILY
Qty: 30 TABLET | Refills: 0 | Status: SHIPPED | OUTPATIENT
Start: 2025-02-04

## 2025-02-04 RX ORDER — DEXTROAMPHETAMINE SACCHARATE, AMPHETAMINE ASPARTATE MONOHYDRATE, DEXTROAMPHETAMINE SULFATE AND AMPHETAMINE SULFATE 5; 5; 5; 5 MG/1; MG/1; MG/1; MG/1
20 CAPSULE, EXTENDED RELEASE ORAL DAILY
Qty: 30 CAPSULE | Refills: 0 | Status: SHIPPED | OUTPATIENT
Start: 2025-02-04

## 2025-02-04 NOTE — TELEPHONE ENCOUNTER
Patient requesting refill of their Adderall 10 mg, Adderall 20 mg, and gabapentin ; Patient was last seen in Sep 2023 and has follow up appointment in Feb 2025 with Dr Phelps. Pended prescriptions to last until follow-up  to Dr Phelps for approval.    Josi Little RN

## 2025-02-06 ENCOUNTER — TELEPHONE (OUTPATIENT)
Dept: NEUROLOGY | Facility: CLINIC | Age: 40
End: 2025-02-06
Payer: COMMERCIAL

## 2025-02-06 NOTE — TELEPHONE ENCOUNTER
"Spoke with Darin who recently has been having episodes of vision changes, including one episode that he felt maybe included both eyes, not just his left (previous ON) eye. Otherwise primarily left eye affected. Episodes have included blurry peripheral vision, flashing, and \"floating dots.\" These episodes have been brief, with vision returning to baseline in between. Denies current illness, but reports that he has been under significant stress lately (moving, health changes within the family etc). Episodes have improved with rest, hydration. Darin asks if this could be beginning of new optic neuritis and wondering if steroids are indicated (also submitted a refill request for medrol dosepak).    In addition, Darin reports that he completed MRIs at [facility] at the end of November, and it does not look like we have received those results; will request.     Follow-up is scheduled for 2/25 and Darin will at that appt bring up potential medication change. He specifically mentions that he is interested in switching to Ocrevus. Has had some worse than usual local injection site reactions and is experiencing shot fatigue from the 3 times per week Rebif.     Josi Little RN    "

## 2025-02-06 NOTE — TELEPHONE ENCOUNTER
[facility] Call Center    Phone Message    May a detailed message be left on voicemail: yes     Reason for Call: Other: Patient returned call to clinic to speak with RN. Please review.     Action Taken: Message routed to:  [facility] Neurology    Travel Screening: Not Applicable     Date of Service:

## 2025-02-06 NOTE — TELEPHONE ENCOUNTER
That sounds like migraine visual aura (blurriness, often brightness, moving geometrical shapes/dots/jagged lines, that usually starts in the periphery and gradually moves across the visual field, usually lasts 30 minutes or so).  People with migraines often have these shortly before they get a migraine headache, but they also occur without the ensuing headache.  Does not sound like optic neuritis.

## 2025-02-06 NOTE — TELEPHONE ENCOUNTER
"Voicemail message left for Darin, asking for call back to clinic to discuss symptoms. Darin had sent us the following message in a refill request encounter:    \"I am experiencing symptoms similar to those I had when I was diagnosed with optic neuritis in my left eye. I see floating dots and have tunnel vision with blurry sides. These symptoms come and go. Could I please get a methylprednisolone 4-milligram tablet therapy pack?\"  "

## 2025-02-07 NOTE — TELEPHONE ENCOUNTER
Left VM for Darin asking for call back to discuss Dr Phelps's input on recent symptoms.     Josi Little RN

## 2025-02-07 NOTE — TELEPHONE ENCOUNTER
Have not heard back from Darin. Another VM left, this time with detailed information including Dr Phelps's input on the visual symptoms.     Josi Little RN

## 2025-02-12 NOTE — TELEPHONE ENCOUNTER
MRI report for brain MRI has been received from [facility], report placed in Dr. Phelps's folders for review and signature.   Carlos Teixeira EMT February 12, 2025

## 2025-02-21 ENCOUNTER — TELEPHONE (OUTPATIENT)
Dept: NEUROLOGY | Facility: CLINIC | Age: 40
End: 2025-02-21
Payer: COMMERCIAL

## 2025-02-21 NOTE — TELEPHONE ENCOUNTER
MRI images from 12/13/24 now resolved in PACS. Report in media tab. Darin has follow-up scheduled for 2/25.    Josi Little RN

## 2025-02-25 ENCOUNTER — DOCUMENTATION ONLY (OUTPATIENT)
Dept: NEUROLOGY | Facility: CLINIC | Age: 40
End: 2025-02-25

## 2025-02-25 ENCOUNTER — OFFICE VISIT (OUTPATIENT)
Dept: NEUROLOGY | Facility: CLINIC | Age: 40
End: 2025-02-25
Attending: PSYCHIATRY & NEUROLOGY
Payer: COMMERCIAL

## 2025-02-25 ENCOUNTER — LAB (OUTPATIENT)
Dept: LAB | Facility: CLINIC | Age: 40
End: 2025-02-25
Payer: COMMERCIAL

## 2025-02-25 VITALS
SYSTOLIC BLOOD PRESSURE: 141 MMHG | HEIGHT: 74 IN | DIASTOLIC BLOOD PRESSURE: 92 MMHG | OXYGEN SATURATION: 98 % | WEIGHT: 223.8 LBS | HEART RATE: 113 BPM | BODY MASS INDEX: 28.72 KG/M2

## 2025-02-25 DIAGNOSIS — E55.9 VITAMIN D DEFICIENCY: ICD-10-CM

## 2025-02-25 DIAGNOSIS — G35 MULTIPLE SCLEROSIS (H): Primary | ICD-10-CM

## 2025-02-25 DIAGNOSIS — G35 MULTIPLE SCLEROSIS (H): ICD-10-CM

## 2025-02-25 LAB
ALT SERPL W P-5'-P-CCNC: 34 U/L (ref 0–70)
AST SERPL W P-5'-P-CCNC: 25 U/L (ref 0–45)
ERYTHROCYTE [DISTWIDTH] IN BLOOD BY AUTOMATED COUNT: 11.9 % (ref 10–15)
HCT VFR BLD AUTO: 46.4 % (ref 40–53)
HGB BLD-MCNC: 15.8 G/DL (ref 13.3–17.7)
MCH RBC QN AUTO: 30.2 PG (ref 26.5–33)
MCHC RBC AUTO-ENTMCNC: 34.1 G/DL (ref 31.5–36.5)
MCV RBC AUTO: 89 FL (ref 78–100)
PLATELET # BLD AUTO: 225 10E3/UL (ref 150–450)
RBC # BLD AUTO: 5.24 10E6/UL (ref 4.4–5.9)
VIT D+METAB SERPL-MCNC: 42 NG/ML (ref 20–50)
WBC # BLD AUTO: 5.2 10E3/UL (ref 4–11)

## 2025-02-25 PROCEDURE — 82306 VITAMIN D 25 HYDROXY: CPT | Performed by: PSYCHIATRY & NEUROLOGY

## 2025-02-25 PROCEDURE — 84450 TRANSFERASE (AST) (SGOT): CPT | Performed by: PATHOLOGY

## 2025-02-25 PROCEDURE — 84460 ALANINE AMINO (ALT) (SGPT): CPT | Performed by: PATHOLOGY

## 2025-02-25 PROCEDURE — 99000 SPECIMEN HANDLING OFFICE-LAB: CPT | Performed by: PATHOLOGY

## 2025-02-25 PROCEDURE — 85027 COMPLETE CBC AUTOMATED: CPT | Performed by: PATHOLOGY

## 2025-02-25 PROCEDURE — 36415 COLL VENOUS BLD VENIPUNCTURE: CPT | Performed by: PATHOLOGY

## 2025-02-25 RX ORDER — SILDENAFIL 50 MG/1
50 TABLET, FILM COATED ORAL DAILY PRN
Qty: 20 TABLET | Refills: 5 | Status: SHIPPED | OUTPATIENT
Start: 2025-02-25

## 2025-02-25 ASSESSMENT — PAIN SCALES - GENERAL: PAINLEVEL_OUTOF10: MODERATE PAIN (5)

## 2025-02-25 NOTE — NURSING NOTE
Chief Complaint   Patient presents with    MS    RECHECK     MS follow up      Vitals were taken and medications were reconciled.   Carlos Teixeira, EMT  11:26 AM

## 2025-02-25 NOTE — PROGRESS NOTES
Orders for brain MRI have been faxed over to [facility] at [phone].  Carlos Teixeira EMT February 25, 2025

## 2025-02-27 DIAGNOSIS — G35 MULTIPLE SCLEROSIS (H): ICD-10-CM

## 2025-02-28 RX ORDER — INTERFERON BETA-1A 44 UG/.5ML
INJECTION, SOLUTION SUBCUTANEOUS
Qty: 6 ML | Refills: 11 | Status: SHIPPED | OUTPATIENT
Start: 2025-02-28

## 2025-04-07 ENCOUNTER — MYC REFILL (OUTPATIENT)
Dept: NEUROLOGY | Facility: CLINIC | Age: 40
End: 2025-04-07
Payer: COMMERCIAL

## 2025-04-07 DIAGNOSIS — G35 MULTIPLE SCLEROSIS (H): ICD-10-CM

## 2025-04-07 RX ORDER — DEXTROAMPHETAMINE SACCHARATE, AMPHETAMINE ASPARTATE MONOHYDRATE, DEXTROAMPHETAMINE SULFATE AND AMPHETAMINE SULFATE 5; 5; 5; 5 MG/1; MG/1; MG/1; MG/1
20 CAPSULE, EXTENDED RELEASE ORAL DAILY
Qty: 30 CAPSULE | Refills: 0 | Status: SHIPPED | OUTPATIENT
Start: 2025-04-07

## 2025-04-07 RX ORDER — SILDENAFIL 50 MG/1
50 TABLET, FILM COATED ORAL DAILY PRN
Qty: 20 TABLET | Refills: 5 | Status: SHIPPED | OUTPATIENT
Start: 2025-04-07

## 2025-04-07 RX ORDER — DEXTROAMPHETAMINE SACCHARATE, AMPHETAMINE ASPARTATE, DEXTROAMPHETAMINE SULFATE AND AMPHETAMINE SULFATE 2.5; 2.5; 2.5; 2.5 MG/1; MG/1; MG/1; MG/1
10 TABLET ORAL DAILY
Qty: 30 TABLET | Refills: 0 | Status: SHIPPED | OUTPATIENT
Start: 2025-04-07

## 2025-04-07 NOTE — TELEPHONE ENCOUNTER
Patient requesting refill of their Adderall (XR 20 mg and 10 mg) and and sildenafil; Patient was last seen in Feb 2025 and has follow up appointment in Feb 2026 with Dr Phelps. Pended rx to Dr Phelps for signature.    Josi Little RN

## 2025-05-07 ENCOUNTER — MYC REFILL (OUTPATIENT)
Dept: NEUROLOGY | Facility: CLINIC | Age: 40
End: 2025-05-07
Payer: COMMERCIAL

## 2025-05-07 DIAGNOSIS — G35 MULTIPLE SCLEROSIS (H): ICD-10-CM

## 2025-05-07 NOTE — TELEPHONE ENCOUNTER
Patient requesting refill of their Adderall 10 mg and Adderall XR 20 mg; Patient was last seen in Feb 2025 and has follow up appointment in Feb 2026 with Dr Phelps. Pended rx to Dr Phelps for signature and will send electronically to the pharmacy once signed.    Josi Little RN

## 2025-05-08 RX ORDER — DEXTROAMPHETAMINE SACCHARATE, AMPHETAMINE ASPARTATE MONOHYDRATE, DEXTROAMPHETAMINE SULFATE AND AMPHETAMINE SULFATE 5; 5; 5; 5 MG/1; MG/1; MG/1; MG/1
20 CAPSULE, EXTENDED RELEASE ORAL DAILY
Qty: 30 CAPSULE | Refills: 0 | Status: SHIPPED | OUTPATIENT
Start: 2025-05-08

## 2025-05-08 RX ORDER — DEXTROAMPHETAMINE SACCHARATE, AMPHETAMINE ASPARTATE, DEXTROAMPHETAMINE SULFATE AND AMPHETAMINE SULFATE 2.5; 2.5; 2.5; 2.5 MG/1; MG/1; MG/1; MG/1
10 TABLET ORAL DAILY
Qty: 30 TABLET | Refills: 0 | Status: SHIPPED | OUTPATIENT
Start: 2025-05-08

## 2025-06-01 DIAGNOSIS — K21.9 GASTROESOPHAGEAL REFLUX DISEASE WITHOUT ESOPHAGITIS: ICD-10-CM

## 2025-06-02 RX ORDER — OMEPRAZOLE 20 MG/1
20 CAPSULE, DELAYED RELEASE ORAL DAILY
Qty: 90 CAPSULE | Refills: 1 | Status: SHIPPED | OUTPATIENT
Start: 2025-06-02

## 2025-06-02 NOTE — TELEPHONE ENCOUNTER
Received refill request for omeprazole from [facility]; Patient was last seen in Feb 2025 and has follow up appointment in Feb 2026 with Dr Phelps. Refilled per [place] refill protocol.    Josi Little RN

## 2025-06-08 ENCOUNTER — MYC REFILL (OUTPATIENT)
Dept: NEUROLOGY | Facility: CLINIC | Age: 40
End: 2025-06-08
Payer: COMMERCIAL

## 2025-06-08 DIAGNOSIS — G35 MULTIPLE SCLEROSIS (H): ICD-10-CM

## 2025-06-09 RX ORDER — DEXTROAMPHETAMINE SACCHARATE, AMPHETAMINE ASPARTATE MONOHYDRATE, DEXTROAMPHETAMINE SULFATE AND AMPHETAMINE SULFATE 5; 5; 5; 5 MG/1; MG/1; MG/1; MG/1
20 CAPSULE, EXTENDED RELEASE ORAL DAILY
Qty: 30 CAPSULE | Refills: 0 | Status: SHIPPED | OUTPATIENT
Start: 2025-06-09

## 2025-06-09 RX ORDER — SILDENAFIL 50 MG/1
50 TABLET, FILM COATED ORAL DAILY PRN
Qty: 20 TABLET | Refills: 5 | Status: SHIPPED | OUTPATIENT
Start: 2025-06-09

## 2025-06-09 RX ORDER — DEXTROAMPHETAMINE SACCHARATE, AMPHETAMINE ASPARTATE, DEXTROAMPHETAMINE SULFATE AND AMPHETAMINE SULFATE 2.5; 2.5; 2.5; 2.5 MG/1; MG/1; MG/1; MG/1
10 TABLET ORAL DAILY
Qty: 30 TABLET | Refills: 0 | Status: SHIPPED | OUTPATIENT
Start: 2025-06-09

## 2025-06-09 NOTE — TELEPHONE ENCOUNTER
Patient requesting refill of their Adderall 10 mg, Adderall XR 20 mg, and sildenafil; Patient was last seen in Feb 2025 and has follow up appointment in Feb 2026 with Dr Phelps. Pended rx to Dr Phelps for signature and will send electronically to the pharmacy once signed.    Josi Little RN

## 2025-06-26 ENCOUNTER — TELEPHONE (OUTPATIENT)
Dept: NEUROLOGY | Facility: CLINIC | Age: 40
End: 2025-06-26
Payer: COMMERCIAL

## 2025-06-26 NOTE — TELEPHONE ENCOUNTER
[facility] Call Center    Phone Message    May a detailed message be left on voicemail: yes     Reason for Call: Other:       Patient states that [employer] needs forms completed for the patient to continue to be on interferon beta-1a (REBIF) 44 MCG/0.5ML injection medication.   Patient states he completed the forms and sent them to Dr Neal to finish, patient requesting update on forms, call back #[phone]     Action Taken: Message routed to:  [facility] Neurology    Travel Screening: Not Applicable

## 2025-06-27 NOTE — TELEPHONE ENCOUNTER
PA Initiation    Medication: REBIF 44 MCG/0.5ML SC SOSY  Insurance Company: [employer] - Phone [phone] Fax [phone]  Pharmacy Filling the Rx: [facility] - [place], [place] - [place] [address]  Filling Pharmacy Phone:    Filling Pharmacy Fax:    Start Date: 6/27/2025          Thank you,    Libby Caro [facility]-T  Specialty Pharmacy Clinic Liaison - CardiologyNeurology[facility] [place] [facility] [address]  [address] [place] [address]  Ph: [phone] Fax: [phone]  [email or web address]

## 2025-06-27 NOTE — TELEPHONE ENCOUNTER
Spoke with Darin, who is on Rebif (prefilled syringes 44 mcg) through [employer] P[employer]. Forms need to be renewed for the program. Darin reports that he completed his form, and that [employer] sent form to our clinic to complete. Discussed that I do not see that we received any forms.     Rebif prescriptions and service request form prepared and placed in Dr Phelps's folder, as [employer] typically needs this renewed periodically for continued service.     Libby, would you be able to check with [employer] if anything additional is needed at this time? Do we need to submit a PA and appeal for continued PAP?    Josi Little RN

## 2025-07-01 ENCOUNTER — DOCUMENTATION ONLY (OUTPATIENT)
Dept: NEUROLOGY | Facility: CLINIC | Age: 40
End: 2025-07-01
Payer: COMMERCIAL

## 2025-07-01 NOTE — PROGRESS NOTES
Rebif forms have been filled out, signed and faxed back at [phone].  Carlos Teixeira EMT July 1, 2025

## 2025-07-08 NOTE — TELEPHONE ENCOUNTER
PRIOR AUTHORIZATION DENIED    Medication: REBIF 44 MCG/0.5ML SC SOSY  Insurance Company: [employer] - Phone [phone] Fax [phone]  Denial Date: 7/2/2025  Denial Reason(s): Patient has not tried and failed Avonex  Appeal Information:     Patient Notified: Yes          Thank you,    Libby Caro h-T  Specialty Pharmacy Clinic Liaison - CardiologyNeurologyMultiple Sclerosis  [facility] [address]  [address] [place] [address]  Ph: [phone] Fax: [phone]  [email or web address]

## 2025-07-13 ENCOUNTER — HEALTH MAINTENANCE LETTER (OUTPATIENT)
Age: 40
End: 2025-07-13

## 2025-07-20 ENCOUNTER — MYC REFILL (OUTPATIENT)
Dept: NEUROLOGY | Facility: CLINIC | Age: 40
End: 2025-07-20
Payer: COMMERCIAL

## 2025-07-20 DIAGNOSIS — G35 MULTIPLE SCLEROSIS (H): ICD-10-CM

## 2025-07-20 RX ORDER — INTERFERON BETA-1A 44 UG/.5ML
INJECTION, SOLUTION SUBCUTANEOUS
Qty: 6 ML | Refills: 11 | Status: CANCELLED | OUTPATIENT
Start: 2025-07-20

## 2025-07-21 RX ORDER — DEXTROAMPHETAMINE SACCHARATE, AMPHETAMINE ASPARTATE, DEXTROAMPHETAMINE SULFATE AND AMPHETAMINE SULFATE 2.5; 2.5; 2.5; 2.5 MG/1; MG/1; MG/1; MG/1
10 TABLET ORAL DAILY
Qty: 30 TABLET | Refills: 0 | Status: SHIPPED | OUTPATIENT
Start: 2025-07-21

## 2025-07-21 RX ORDER — DEXTROAMPHETAMINE SACCHARATE, AMPHETAMINE ASPARTATE MONOHYDRATE, DEXTROAMPHETAMINE SULFATE AND AMPHETAMINE SULFATE 5; 5; 5; 5 MG/1; MG/1; MG/1; MG/1
20 CAPSULE, EXTENDED RELEASE ORAL DAILY
Qty: 30 CAPSULE | Refills: 0 | Status: SHIPPED | OUTPATIENT
Start: 2025-07-21

## 2025-07-21 NOTE — CONFIDENTIAL NOTE
Patient requesting refill of their Adderall; Patient was last seen in February and has follow up appointment in February with Dr. Phelps. Pended rx to Dr. Phelps for signature and will send electronically to the pharmacy once signed.    Teresa Chinchilla RN